# Patient Record
Sex: MALE | Race: WHITE | NOT HISPANIC OR LATINO | Employment: FULL TIME | URBAN - METROPOLITAN AREA
[De-identification: names, ages, dates, MRNs, and addresses within clinical notes are randomized per-mention and may not be internally consistent; named-entity substitution may affect disease eponyms.]

---

## 2024-01-01 ENCOUNTER — APPOINTMENT (OUTPATIENT)
Dept: RADIOLOGY | Facility: MEDICAL CENTER | Age: 54
DRG: 871 | End: 2024-01-01
Attending: INTERNAL MEDICINE
Payer: COMMERCIAL

## 2024-01-01 ENCOUNTER — APPOINTMENT (OUTPATIENT)
Dept: RADIOLOGY | Facility: MEDICAL CENTER | Age: 54
DRG: 439 | End: 2024-01-01
Attending: INTERNAL MEDICINE
Payer: COMMERCIAL

## 2024-01-01 ENCOUNTER — APPOINTMENT (OUTPATIENT)
Dept: RADIOLOGY | Facility: MEDICAL CENTER | Age: 54
DRG: 871 | End: 2024-01-01
Attending: FAMILY MEDICINE
Payer: COMMERCIAL

## 2024-01-01 ENCOUNTER — HOSPITAL ENCOUNTER (INPATIENT)
Facility: MEDICAL CENTER | Age: 54
LOS: 12 days | DRG: 871 | End: 2024-06-30
Attending: STUDENT IN AN ORGANIZED HEALTH CARE EDUCATION/TRAINING PROGRAM | Admitting: STUDENT IN AN ORGANIZED HEALTH CARE EDUCATION/TRAINING PROGRAM
Payer: COMMERCIAL

## 2024-01-01 ENCOUNTER — ANESTHESIA (OUTPATIENT)
Dept: SURGERY | Facility: MEDICAL CENTER | Age: 54
DRG: 439 | End: 2024-01-01
Payer: COMMERCIAL

## 2024-01-01 ENCOUNTER — APPOINTMENT (OUTPATIENT)
Dept: RADIOLOGY | Facility: MEDICAL CENTER | Age: 54
DRG: 439 | End: 2024-01-01
Attending: HOSPITALIST
Payer: COMMERCIAL

## 2024-01-01 ENCOUNTER — APPOINTMENT (OUTPATIENT)
Dept: RADIOLOGY | Facility: MEDICAL CENTER | Age: 54
End: 2024-01-01
Payer: COMMERCIAL

## 2024-01-01 ENCOUNTER — TELEPHONE (OUTPATIENT)
Dept: HEALTH INFORMATION MANAGEMENT | Facility: OTHER | Age: 54
End: 2024-01-01
Payer: COMMERCIAL

## 2024-01-01 ENCOUNTER — APPOINTMENT (OUTPATIENT)
Dept: CARDIOLOGY | Facility: MEDICAL CENTER | Age: 54
DRG: 871 | End: 2024-01-01
Attending: NURSE PRACTITIONER
Payer: COMMERCIAL

## 2024-01-01 ENCOUNTER — PHARMACY VISIT (OUTPATIENT)
Dept: PHARMACY | Facility: MEDICAL CENTER | Age: 54
End: 2024-01-01
Payer: COMMERCIAL

## 2024-01-01 ENCOUNTER — HOSPITAL ENCOUNTER (INPATIENT)
Facility: MEDICAL CENTER | Age: 54
LOS: 7 days | DRG: 439 | End: 2024-06-08
Attending: HOSPITALIST | Admitting: HOSPITALIST
Payer: COMMERCIAL

## 2024-01-01 ENCOUNTER — HOSPITAL ENCOUNTER (OUTPATIENT)
Dept: RADIOLOGY | Facility: MEDICAL CENTER | Age: 54
End: 2024-06-01
Payer: COMMERCIAL

## 2024-01-01 ENCOUNTER — ANESTHESIA EVENT (OUTPATIENT)
Dept: SURGERY | Facility: MEDICAL CENTER | Age: 54
DRG: 439 | End: 2024-01-01
Payer: COMMERCIAL

## 2024-01-01 VITALS
RESPIRATION RATE: 17 BRPM | DIASTOLIC BLOOD PRESSURE: 35 MMHG | SYSTOLIC BLOOD PRESSURE: 83 MMHG | TEMPERATURE: 95.5 F | OXYGEN SATURATION: 93 % | BODY MASS INDEX: 37.83 KG/M2 | WEIGHT: 304.24 LBS | HEIGHT: 75 IN | HEART RATE: 97 BPM

## 2024-01-01 VITALS
WEIGHT: 264.77 LBS | HEIGHT: 75 IN | BODY MASS INDEX: 32.92 KG/M2 | TEMPERATURE: 97.9 F | OXYGEN SATURATION: 92 % | RESPIRATION RATE: 18 BRPM | HEART RATE: 94 BPM | SYSTOLIC BLOOD PRESSURE: 128 MMHG | DIASTOLIC BLOOD PRESSURE: 71 MMHG

## 2024-01-01 DIAGNOSIS — R18.8 CIRRHOSIS OF LIVER WITH ASCITES, UNSPECIFIED HEPATIC CIRRHOSIS TYPE (HCC): ICD-10-CM

## 2024-01-01 DIAGNOSIS — K85.10 GALLSTONE PANCREATITIS: ICD-10-CM

## 2024-01-01 DIAGNOSIS — K74.60 HEPATIC CIRRHOSIS, UNSPECIFIED HEPATIC CIRRHOSIS TYPE, UNSPECIFIED WHETHER ASCITES PRESENT (HCC): ICD-10-CM

## 2024-01-01 DIAGNOSIS — K74.60 CIRRHOSIS OF LIVER WITH ASCITES, UNSPECIFIED HEPATIC CIRRHOSIS TYPE (HCC): ICD-10-CM

## 2024-01-01 LAB
AFP-TM SERPL-MCNC: 1 NG/ML (ref 0–9)
ALBUMIN FLD-MCNC: 0.2 G/DL
ALBUMIN SERPL BCP-MCNC: 2.7 G/DL (ref 3.2–4.9)
ALBUMIN SERPL BCP-MCNC: 2.9 G/DL (ref 3.2–4.9)
ALBUMIN SERPL BCP-MCNC: 3 G/DL (ref 3.2–4.9)
ALBUMIN SERPL BCP-MCNC: 3.1 G/DL (ref 3.2–4.9)
ALBUMIN SERPL BCP-MCNC: 3.2 G/DL (ref 3.2–4.9)
ALBUMIN SERPL BCP-MCNC: 3.3 G/DL (ref 3.2–4.9)
ALBUMIN SERPL BCP-MCNC: 3.4 G/DL (ref 3.2–4.9)
ALBUMIN SERPL BCP-MCNC: 3.6 G/DL (ref 3.2–4.9)
ALBUMIN/GLOB SERPL: 0.9 G/DL
ALBUMIN/GLOB SERPL: 1 G/DL
ALBUMIN/GLOB SERPL: 1 G/DL
ALBUMIN/GLOB SERPL: 1.2 G/DL
ALBUMIN/GLOB SERPL: 1.2 G/DL
ALBUMIN/GLOB SERPL: 1.4 G/DL
ALBUMIN/GLOB SERPL: 1.5 G/DL
ALBUMIN/GLOB SERPL: 1.6 G/DL
ALBUMIN/GLOB SERPL: 1.8 G/DL
ALBUMIN/GLOB SERPL: 1.8 G/DL
ALBUMIN/GLOB SERPL: 2.1 G/DL
ALBUMIN/GLOB SERPL: 2.2 G/DL
ALBUMIN/GLOB SERPL: 2.2 G/DL
ALBUMIN/GLOB SERPL: 2.4 G/DL
ALBUMIN/GLOB SERPL: 2.4 G/DL
ALBUMIN/GLOB SERPL: 2.5 G/DL
ALBUMIN/GLOB SERPL: 2.8 G/DL
ALBUMIN/GLOB SERPL: 3.3 G/DL
ALP SERPL-CCNC: 126 U/L (ref 30–99)
ALP SERPL-CCNC: 130 U/L (ref 30–99)
ALP SERPL-CCNC: 133 U/L (ref 30–99)
ALP SERPL-CCNC: 134 U/L (ref 30–99)
ALP SERPL-CCNC: 136 U/L (ref 30–99)
ALP SERPL-CCNC: 141 U/L (ref 30–99)
ALP SERPL-CCNC: 148 U/L (ref 30–99)
ALP SERPL-CCNC: 157 U/L (ref 30–99)
ALP SERPL-CCNC: 159 U/L (ref 30–99)
ALP SERPL-CCNC: 175 U/L (ref 30–99)
ALP SERPL-CCNC: 179 U/L (ref 30–99)
ALP SERPL-CCNC: 180 U/L (ref 30–99)
ALP SERPL-CCNC: 261 U/L (ref 30–99)
ALP SERPL-CCNC: 264 U/L (ref 30–99)
ALP SERPL-CCNC: 286 U/L (ref 30–99)
ALP SERPL-CCNC: 290 U/L (ref 30–99)
ALP SERPL-CCNC: 312 U/L (ref 30–99)
ALP SERPL-CCNC: 320 U/L (ref 30–99)
ALP SERPL-CCNC: 320 U/L (ref 30–99)
ALP SERPL-CCNC: 331 U/L (ref 30–99)
ALT SERPL-CCNC: 15 U/L (ref 2–50)
ALT SERPL-CCNC: 18 U/L (ref 2–50)
ALT SERPL-CCNC: 19 U/L (ref 2–50)
ALT SERPL-CCNC: 20 U/L (ref 2–50)
ALT SERPL-CCNC: 20 U/L (ref 2–50)
ALT SERPL-CCNC: 21 U/L (ref 2–50)
ALT SERPL-CCNC: 22 U/L (ref 2–50)
ALT SERPL-CCNC: 22 U/L (ref 2–50)
ALT SERPL-CCNC: 23 U/L (ref 2–50)
ALT SERPL-CCNC: 24 U/L (ref 2–50)
ALT SERPL-CCNC: 26 U/L (ref 2–50)
ALT SERPL-CCNC: 29 U/L (ref 2–50)
ALT SERPL-CCNC: 31 U/L (ref 2–50)
ALT SERPL-CCNC: 72 U/L (ref 2–50)
AMMONIA PLAS-SCNC: 28 UMOL/L (ref 11–45)
AMMONIA PLAS-SCNC: 35 UMOL/L (ref 11–45)
AMMONIA PLAS-SCNC: 41 UMOL/L (ref 11–45)
AMMONIA PLAS-SCNC: 43 UMOL/L (ref 11–45)
AMMONIA PLAS-SCNC: 44 UMOL/L (ref 11–45)
AMYLASE FLD-CCNC: >7500 U/L
ANION GAP SERPL CALC-SCNC: 11 MMOL/L (ref 7–16)
ANION GAP SERPL CALC-SCNC: 11 MMOL/L (ref 7–16)
ANION GAP SERPL CALC-SCNC: 12 MMOL/L (ref 7–16)
ANION GAP SERPL CALC-SCNC: 14 MMOL/L (ref 7–16)
ANION GAP SERPL CALC-SCNC: 15 MMOL/L (ref 7–16)
ANION GAP SERPL CALC-SCNC: 17 MMOL/L (ref 7–16)
ANION GAP SERPL CALC-SCNC: 18 MMOL/L (ref 7–16)
ANION GAP SERPL CALC-SCNC: 18 MMOL/L (ref 7–16)
ANION GAP SERPL CALC-SCNC: 19 MMOL/L (ref 7–16)
ANION GAP SERPL CALC-SCNC: 19 MMOL/L (ref 7–16)
ANION GAP SERPL CALC-SCNC: 20 MMOL/L (ref 7–16)
ANION GAP SERPL CALC-SCNC: 21 MMOL/L (ref 7–16)
ANION GAP SERPL CALC-SCNC: 22 MMOL/L (ref 7–16)
ANION GAP SERPL CALC-SCNC: 24 MMOL/L (ref 7–16)
ANION GAP SERPL CALC-SCNC: 30 MMOL/L (ref 7–16)
APPEARANCE FLD: CLEAR
APPEARANCE FLD: NORMAL
APPEARANCE UR: ABNORMAL
APPEARANCE UR: CLEAR
AST SERPL-CCNC: 102 U/L (ref 12–45)
AST SERPL-CCNC: 334 U/L (ref 12–45)
AST SERPL-CCNC: 38 U/L (ref 12–45)
AST SERPL-CCNC: 41 U/L (ref 12–45)
AST SERPL-CCNC: 42 U/L (ref 12–45)
AST SERPL-CCNC: 45 U/L (ref 12–45)
AST SERPL-CCNC: 45 U/L (ref 12–45)
AST SERPL-CCNC: 48 U/L (ref 12–45)
AST SERPL-CCNC: 49 U/L (ref 12–45)
AST SERPL-CCNC: 49 U/L (ref 12–45)
AST SERPL-CCNC: 51 U/L (ref 12–45)
AST SERPL-CCNC: 52 U/L (ref 12–45)
AST SERPL-CCNC: 52 U/L (ref 12–45)
AST SERPL-CCNC: 55 U/L (ref 12–45)
AST SERPL-CCNC: 64 U/L (ref 12–45)
AST SERPL-CCNC: 65 U/L (ref 12–45)
AST SERPL-CCNC: 74 U/L (ref 12–45)
AST SERPL-CCNC: 77 U/L (ref 12–45)
AST SERPL-CCNC: 81 U/L (ref 12–45)
AST SERPL-CCNC: 89 U/L (ref 12–45)
BACTERIA #/AREA URNS HPF: ABNORMAL /HPF
BACTERIA #/AREA URNS HPF: ABNORMAL /HPF
BACTERIA BLD CULT: NORMAL
BACTERIA FLD AEROBE CULT: NORMAL
BACTERIA FLD AEROBE CULT: NORMAL
BASOPHILS # BLD AUTO: 0.1 % (ref 0–1.8)
BASOPHILS # BLD AUTO: 0.2 % (ref 0–1.8)
BASOPHILS # BLD AUTO: 0.3 % (ref 0–1.8)
BASOPHILS # BLD AUTO: 0.3 % (ref 0–1.8)
BASOPHILS # BLD: 0.01 K/UL (ref 0–0.12)
BASOPHILS # BLD: 0.02 K/UL (ref 0–0.12)
BASOPHILS # BLD: 0.03 K/UL (ref 0–0.12)
BASOPHILS # BLD: 0.04 K/UL (ref 0–0.12)
BASOPHILS # BLD: 0.05 K/UL (ref 0–0.12)
BASOPHILS # BLD: 0.05 K/UL (ref 0–0.12)
BASOPHILS # BLD: 0.12 K/UL (ref 0–0.12)
BILIRUB FLD-MCNC: 0.7 MG/DL
BILIRUB SERPL-MCNC: 11.4 MG/DL (ref 0.1–1.5)
BILIRUB SERPL-MCNC: 14.2 MG/DL (ref 0.1–1.5)
BILIRUB SERPL-MCNC: 17.9 MG/DL (ref 0.1–1.5)
BILIRUB SERPL-MCNC: 19.9 MG/DL (ref 0.1–1.5)
BILIRUB SERPL-MCNC: 2.5 MG/DL (ref 0.1–1.5)
BILIRUB SERPL-MCNC: 2.7 MG/DL (ref 0.1–1.5)
BILIRUB SERPL-MCNC: 2.8 MG/DL (ref 0.1–1.5)
BILIRUB SERPL-MCNC: 2.9 MG/DL (ref 0.1–1.5)
BILIRUB SERPL-MCNC: 22 MG/DL (ref 0.1–1.5)
BILIRUB SERPL-MCNC: 3.3 MG/DL (ref 0.1–1.5)
BILIRUB SERPL-MCNC: 3.3 MG/DL (ref 0.1–1.5)
BILIRUB SERPL-MCNC: 3.6 MG/DL (ref 0.1–1.5)
BILIRUB SERPL-MCNC: 4 MG/DL (ref 0.1–1.5)
BILIRUB SERPL-MCNC: 4.1 MG/DL (ref 0.1–1.5)
BILIRUB SERPL-MCNC: 4.2 MG/DL (ref 0.1–1.5)
BILIRUB SERPL-MCNC: 4.5 MG/DL (ref 0.1–1.5)
BILIRUB SERPL-MCNC: 4.7 MG/DL (ref 0.1–1.5)
BILIRUB SERPL-MCNC: 5 MG/DL (ref 0.1–1.5)
BILIRUB SERPL-MCNC: 6.4 MG/DL (ref 0.1–1.5)
BILIRUB SERPL-MCNC: 9.1 MG/DL (ref 0.1–1.5)
BILIRUB UR QL STRIP.AUTO: ABNORMAL
BILIRUB UR QL STRIP.AUTO: ABNORMAL
BODY FLD TYPE: NORMAL
BUN SERPL-MCNC: 100 MG/DL (ref 8–22)
BUN SERPL-MCNC: 101 MG/DL (ref 8–22)
BUN SERPL-MCNC: 16 MG/DL (ref 8–22)
BUN SERPL-MCNC: 19 MG/DL (ref 8–22)
BUN SERPL-MCNC: 20 MG/DL (ref 8–22)
BUN SERPL-MCNC: 21 MG/DL (ref 8–22)
BUN SERPL-MCNC: 22 MG/DL (ref 8–22)
BUN SERPL-MCNC: 29 MG/DL (ref 8–22)
BUN SERPL-MCNC: 30 MG/DL (ref 8–22)
BUN SERPL-MCNC: 44 MG/DL (ref 8–22)
BUN SERPL-MCNC: 48 MG/DL (ref 8–22)
BUN SERPL-MCNC: 56 MG/DL (ref 8–22)
BUN SERPL-MCNC: 58 MG/DL (ref 8–22)
BUN SERPL-MCNC: 61 MG/DL (ref 8–22)
BUN SERPL-MCNC: 68 MG/DL (ref 8–22)
BUN SERPL-MCNC: 71 MG/DL (ref 8–22)
BUN SERPL-MCNC: 73 MG/DL (ref 8–22)
BUN SERPL-MCNC: 85 MG/DL (ref 8–22)
BUN SERPL-MCNC: 86 MG/DL (ref 8–22)
BUN SERPL-MCNC: 86 MG/DL (ref 8–22)
BUN SERPL-MCNC: 87 MG/DL (ref 8–22)
BUN SERPL-MCNC: 88 MG/DL (ref 8–22)
BUN SERPL-MCNC: 89 MG/DL (ref 8–22)
BUN SERPL-MCNC: 89 MG/DL (ref 8–22)
BUN SERPL-MCNC: 90 MG/DL (ref 8–22)
BUN SERPL-MCNC: 90 MG/DL (ref 8–22)
BUN SERPL-MCNC: 91 MG/DL (ref 8–22)
BUN SERPL-MCNC: 92 MG/DL (ref 8–22)
BUN SERPL-MCNC: 95 MG/DL (ref 8–22)
BUN SERPL-MCNC: 97 MG/DL (ref 8–22)
BUN SERPL-MCNC: 97 MG/DL (ref 8–22)
BUN SERPL-MCNC: 98 MG/DL (ref 8–22)
CA-I SERPL-SCNC: 1 MMOL/L (ref 1.1–1.3)
CALCIUM ALBUM COR SERPL-MCNC: 10 MG/DL (ref 8.5–10.5)
CALCIUM ALBUM COR SERPL-MCNC: 10.1 MG/DL (ref 8.5–10.5)
CALCIUM ALBUM COR SERPL-MCNC: 10.3 MG/DL (ref 8.5–10.5)
CALCIUM ALBUM COR SERPL-MCNC: 8.1 MG/DL (ref 8.5–10.5)
CALCIUM ALBUM COR SERPL-MCNC: 8.3 MG/DL (ref 8.5–10.5)
CALCIUM ALBUM COR SERPL-MCNC: 8.5 MG/DL (ref 8.5–10.5)
CALCIUM ALBUM COR SERPL-MCNC: 8.5 MG/DL (ref 8.5–10.5)
CALCIUM ALBUM COR SERPL-MCNC: 8.7 MG/DL (ref 8.5–10.5)
CALCIUM ALBUM COR SERPL-MCNC: 8.8 MG/DL (ref 8.5–10.5)
CALCIUM ALBUM COR SERPL-MCNC: 8.9 MG/DL (ref 8.5–10.5)
CALCIUM ALBUM COR SERPL-MCNC: 9 MG/DL (ref 8.5–10.5)
CALCIUM ALBUM COR SERPL-MCNC: 9 MG/DL (ref 8.5–10.5)
CALCIUM ALBUM COR SERPL-MCNC: 9.1 MG/DL (ref 8.5–10.5)
CALCIUM ALBUM COR SERPL-MCNC: 9.2 MG/DL (ref 8.5–10.5)
CALCIUM ALBUM COR SERPL-MCNC: 9.2 MG/DL (ref 8.5–10.5)
CALCIUM ALBUM COR SERPL-MCNC: 9.3 MG/DL (ref 8.5–10.5)
CALCIUM ALBUM COR SERPL-MCNC: 9.4 MG/DL (ref 8.5–10.5)
CALCIUM ALBUM COR SERPL-MCNC: 9.5 MG/DL (ref 8.5–10.5)
CALCIUM ALBUM COR SERPL-MCNC: 9.7 MG/DL (ref 8.5–10.5)
CALCIUM ALBUM COR SERPL-MCNC: 9.9 MG/DL (ref 8.5–10.5)
CALCIUM SERPL-MCNC: 7.4 MG/DL (ref 8.5–10.5)
CALCIUM SERPL-MCNC: 7.4 MG/DL (ref 8.5–10.5)
CALCIUM SERPL-MCNC: 7.5 MG/DL (ref 8.5–10.5)
CALCIUM SERPL-MCNC: 7.6 MG/DL (ref 8.5–10.5)
CALCIUM SERPL-MCNC: 7.6 MG/DL (ref 8.5–10.5)
CALCIUM SERPL-MCNC: 7.7 MG/DL (ref 8.5–10.5)
CALCIUM SERPL-MCNC: 7.7 MG/DL (ref 8.5–10.5)
CALCIUM SERPL-MCNC: 7.8 MG/DL (ref 8.5–10.5)
CALCIUM SERPL-MCNC: 7.9 MG/DL (ref 8.5–10.5)
CALCIUM SERPL-MCNC: 7.9 MG/DL (ref 8.5–10.5)
CALCIUM SERPL-MCNC: 8 MG/DL (ref 8.5–10.5)
CALCIUM SERPL-MCNC: 8 MG/DL (ref 8.5–10.5)
CALCIUM SERPL-MCNC: 8.1 MG/DL (ref 8.4–10.2)
CALCIUM SERPL-MCNC: 8.1 MG/DL (ref 8.5–10.5)
CALCIUM SERPL-MCNC: 8.2 MG/DL (ref 8.4–10.2)
CALCIUM SERPL-MCNC: 8.2 MG/DL (ref 8.4–10.2)
CALCIUM SERPL-MCNC: 8.3 MG/DL (ref 8.4–10.2)
CALCIUM SERPL-MCNC: 8.3 MG/DL (ref 8.4–10.2)
CALCIUM SERPL-MCNC: 8.4 MG/DL (ref 8.4–10.2)
CALCIUM SERPL-MCNC: 8.4 MG/DL (ref 8.4–10.2)
CALCIUM SERPL-MCNC: 8.4 MG/DL (ref 8.5–10.5)
CALCIUM SERPL-MCNC: 8.5 MG/DL (ref 8.5–10.5)
CALCIUM SERPL-MCNC: 8.7 MG/DL (ref 8.5–10.5)
CALCIUM SERPL-MCNC: 8.8 MG/DL (ref 8.5–10.5)
CALCIUM SERPL-MCNC: 9 MG/DL (ref 8.5–10.5)
CALCIUM SERPL-MCNC: 9.1 MG/DL (ref 8.5–10.5)
CALCIUM SERPL-MCNC: 9.3 MG/DL (ref 8.5–10.5)
CALCIUM SERPL-MCNC: 9.4 MG/DL (ref 8.5–10.5)
CALCIUM SERPL-MCNC: 9.4 MG/DL (ref 8.5–10.5)
CANCER AG19-9 SERPL-ACNC: 214 U/ML (ref 0–35)
CEA SERPL-MCNC: 4.2 NG/ML (ref 0–3)
CELLS FLD: 8
CHLORIDE SERPL-SCNC: 101 MMOL/L (ref 96–112)
CHLORIDE SERPL-SCNC: 83 MMOL/L (ref 96–112)
CHLORIDE SERPL-SCNC: 84 MMOL/L (ref 96–112)
CHLORIDE SERPL-SCNC: 85 MMOL/L (ref 96–112)
CHLORIDE SERPL-SCNC: 86 MMOL/L (ref 96–112)
CHLORIDE SERPL-SCNC: 87 MMOL/L (ref 96–112)
CHLORIDE SERPL-SCNC: 87 MMOL/L (ref 96–112)
CHLORIDE SERPL-SCNC: 90 MMOL/L (ref 96–112)
CHLORIDE SERPL-SCNC: 90 MMOL/L (ref 96–112)
CHLORIDE SERPL-SCNC: 91 MMOL/L (ref 96–112)
CHLORIDE SERPL-SCNC: 92 MMOL/L (ref 96–112)
CHLORIDE SERPL-SCNC: 93 MMOL/L (ref 96–112)
CHLORIDE SERPL-SCNC: 93 MMOL/L (ref 96–112)
CHLORIDE SERPL-SCNC: 96 MMOL/L (ref 96–112)
CHLORIDE SERPL-SCNC: 97 MMOL/L (ref 96–112)
CHLORIDE SERPL-SCNC: 98 MMOL/L (ref 96–112)
CHLORIDE SERPL-SCNC: 99 MMOL/L (ref 96–112)
CO2 SERPL-SCNC: 12 MMOL/L (ref 20–33)
CO2 SERPL-SCNC: 13 MMOL/L (ref 20–33)
CO2 SERPL-SCNC: 13 MMOL/L (ref 20–33)
CO2 SERPL-SCNC: 14 MMOL/L (ref 20–33)
CO2 SERPL-SCNC: 15 MMOL/L (ref 20–33)
CO2 SERPL-SCNC: 16 MMOL/L (ref 20–33)
CO2 SERPL-SCNC: 17 MMOL/L (ref 20–33)
CO2 SERPL-SCNC: 18 MMOL/L (ref 20–33)
CO2 SERPL-SCNC: 19 MMOL/L (ref 20–33)
CO2 SERPL-SCNC: 20 MMOL/L (ref 20–33)
CO2 SERPL-SCNC: 21 MMOL/L (ref 20–33)
CO2 SERPL-SCNC: 21 MMOL/L (ref 20–33)
CO2 SERPL-SCNC: 22 MMOL/L (ref 20–33)
CO2 SERPL-SCNC: 22 MMOL/L (ref 20–33)
CO2 SERPL-SCNC: 9 MMOL/L (ref 20–33)
COLOR FLD: YELLOW
COLOR FLD: YELLOW
COLOR UR: YELLOW
COLOR UR: YELLOW
CORTIS SERPL-MCNC: 23.2 UG/DL (ref 0–23)
CREAT SERPL-MCNC: 0.76 MG/DL (ref 0.5–1.4)
CREAT SERPL-MCNC: 0.8 MG/DL (ref 0.5–1.4)
CREAT SERPL-MCNC: 0.88 MG/DL (ref 0.5–1.4)
CREAT SERPL-MCNC: 0.92 MG/DL (ref 0.5–1.4)
CREAT SERPL-MCNC: 1.18 MG/DL (ref 0.5–1.4)
CREAT SERPL-MCNC: 1.96 MG/DL (ref 0.5–1.4)
CREAT SERPL-MCNC: 2.1 MG/DL (ref 0.5–1.4)
CREAT SERPL-MCNC: 3.55 MG/DL (ref 0.5–1.4)
CREAT SERPL-MCNC: 3.67 MG/DL (ref 0.5–1.4)
CREAT SERPL-MCNC: 3.97 MG/DL (ref 0.5–1.4)
CREAT SERPL-MCNC: 4.93 MG/DL (ref 0.5–1.4)
CREAT SERPL-MCNC: 4.95 MG/DL (ref 0.5–1.4)
CREAT SERPL-MCNC: 5.72 MG/DL (ref 0.5–1.4)
CREAT SERPL-MCNC: 5.84 MG/DL (ref 0.5–1.4)
CREAT SERPL-MCNC: 5.96 MG/DL (ref 0.5–1.4)
CREAT SERPL-MCNC: 6.97 MG/DL (ref 0.5–1.4)
CREAT SERPL-MCNC: 6.99 MG/DL (ref 0.5–1.4)
CREAT SERPL-MCNC: 7 MG/DL (ref 0.5–1.4)
CREAT SERPL-MCNC: 7.22 MG/DL (ref 0.5–1.4)
CREAT SERPL-MCNC: 7.26 MG/DL (ref 0.5–1.4)
CREAT SERPL-MCNC: 7.29 MG/DL (ref 0.5–1.4)
CREAT SERPL-MCNC: 7.33 MG/DL (ref 0.5–1.4)
CREAT SERPL-MCNC: 7.33 MG/DL (ref 0.5–1.4)
CREAT SERPL-MCNC: 7.43 MG/DL (ref 0.5–1.4)
CREAT SERPL-MCNC: 7.62 MG/DL (ref 0.5–1.4)
CREAT SERPL-MCNC: 7.64 MG/DL (ref 0.5–1.4)
CREAT SERPL-MCNC: 7.92 MG/DL (ref 0.5–1.4)
CREAT SERPL-MCNC: 8.37 MG/DL (ref 0.5–1.4)
CREAT SERPL-MCNC: 8.49 MG/DL (ref 0.5–1.4)
CREAT SERPL-MCNC: 8.88 MG/DL (ref 0.5–1.4)
CREAT SERPL-MCNC: 8.92 MG/DL (ref 0.5–1.4)
CREAT SERPL-MCNC: 9.1 MG/DL (ref 0.5–1.4)
CREAT SERPL-MCNC: 9.21 MG/DL (ref 0.5–1.4)
CREAT SERPL-MCNC: 9.23 MG/DL (ref 0.5–1.4)
CREAT SERPL-MCNC: 9.31 MG/DL (ref 0.5–1.4)
CREAT SERPL-MCNC: 9.71 MG/DL (ref 0.5–1.4)
CREAT UR-MCNC: 167.4 MG/DL
EKG IMPRESSION: NORMAL
EOSINOPHIL # BLD AUTO: 0.05 K/UL (ref 0–0.51)
EOSINOPHIL # BLD AUTO: 0.05 K/UL (ref 0–0.51)
EOSINOPHIL # BLD AUTO: 0.06 K/UL (ref 0–0.51)
EOSINOPHIL # BLD AUTO: 0.08 K/UL (ref 0–0.51)
EOSINOPHIL # BLD AUTO: 0.11 K/UL (ref 0–0.51)
EOSINOPHIL # BLD AUTO: 0.12 K/UL (ref 0–0.51)
EOSINOPHIL # BLD AUTO: 0.13 K/UL (ref 0–0.51)
EOSINOPHIL # BLD AUTO: 0.14 K/UL (ref 0–0.51)
EOSINOPHIL # BLD AUTO: 0.14 K/UL (ref 0–0.51)
EOSINOPHIL # BLD AUTO: 0.16 K/UL (ref 0–0.51)
EOSINOPHIL # BLD AUTO: 0.18 K/UL (ref 0–0.51)
EOSINOPHIL # BLD AUTO: 0.21 K/UL (ref 0–0.51)
EOSINOPHIL NFR BLD: 0.1 % (ref 0–6.9)
EOSINOPHIL NFR BLD: 0.3 % (ref 0–6.9)
EOSINOPHIL NFR BLD: 0.4 % (ref 0–6.9)
EOSINOPHIL NFR BLD: 0.7 % (ref 0–6.9)
EOSINOPHIL NFR BLD: 1 % (ref 0–6.9)
EOSINOPHIL NFR BLD: 1.1 % (ref 0–6.9)
EOSINOPHIL NFR BLD: 1.2 % (ref 0–6.9)
EOSINOPHIL NFR BLD: 1.2 % (ref 0–6.9)
EPI CELLS #/AREA URNS HPF: ABNORMAL /HPF
EPI CELLS #/AREA URNS HPF: ABNORMAL /HPF
ERYTHROCYTE [DISTWIDTH] IN BLOOD BY AUTOMATED COUNT: 50.1 FL (ref 35.9–50)
ERYTHROCYTE [DISTWIDTH] IN BLOOD BY AUTOMATED COUNT: 51.9 FL (ref 35.9–50)
ERYTHROCYTE [DISTWIDTH] IN BLOOD BY AUTOMATED COUNT: 52.1 FL (ref 35.9–50)
ERYTHROCYTE [DISTWIDTH] IN BLOOD BY AUTOMATED COUNT: 53.3 FL (ref 35.9–50)
ERYTHROCYTE [DISTWIDTH] IN BLOOD BY AUTOMATED COUNT: 53.4 FL (ref 35.9–50)
ERYTHROCYTE [DISTWIDTH] IN BLOOD BY AUTOMATED COUNT: 53.9 FL (ref 35.9–50)
ERYTHROCYTE [DISTWIDTH] IN BLOOD BY AUTOMATED COUNT: 54.2 FL (ref 35.9–50)
ERYTHROCYTE [DISTWIDTH] IN BLOOD BY AUTOMATED COUNT: 54.6 FL (ref 35.9–50)
ERYTHROCYTE [DISTWIDTH] IN BLOOD BY AUTOMATED COUNT: 54.9 FL (ref 35.9–50)
ERYTHROCYTE [DISTWIDTH] IN BLOOD BY AUTOMATED COUNT: 55 FL (ref 35.9–50)
ERYTHROCYTE [DISTWIDTH] IN BLOOD BY AUTOMATED COUNT: 55.3 FL (ref 35.9–50)
ERYTHROCYTE [DISTWIDTH] IN BLOOD BY AUTOMATED COUNT: 55.3 FL (ref 35.9–50)
ERYTHROCYTE [DISTWIDTH] IN BLOOD BY AUTOMATED COUNT: 55.5 FL (ref 35.9–50)
ERYTHROCYTE [DISTWIDTH] IN BLOOD BY AUTOMATED COUNT: 55.8 FL (ref 35.9–50)
ERYTHROCYTE [DISTWIDTH] IN BLOOD BY AUTOMATED COUNT: 56.7 FL (ref 35.9–50)
ERYTHROCYTE [DISTWIDTH] IN BLOOD BY AUTOMATED COUNT: 56.9 FL (ref 35.9–50)
ERYTHROCYTE [DISTWIDTH] IN BLOOD BY AUTOMATED COUNT: 58 FL (ref 35.9–50)
ERYTHROCYTE [DISTWIDTH] IN BLOOD BY AUTOMATED COUNT: 58.1 FL (ref 35.9–50)
ERYTHROCYTE [DISTWIDTH] IN BLOOD BY AUTOMATED COUNT: 59.1 FL (ref 35.9–50)
ERYTHROCYTE [DISTWIDTH] IN BLOOD BY AUTOMATED COUNT: 60.9 FL (ref 35.9–50)
ERYTHROCYTE [DISTWIDTH] IN BLOOD BY AUTOMATED COUNT: 64.1 FL (ref 35.9–50)
FERRITIN SERPL-MCNC: 168 NG/ML (ref 22–322)
FUNGUS SPEC CULT: NORMAL
FUNGUS SPEC FUNGUS STN: NORMAL
FUNGUS SPEC FUNGUS STN: NORMAL
GFR SERPLBLD CREATININE-BSD FMLA CKD-EPI: 100 ML/MIN/1.73 M 2
GFR SERPLBLD CREATININE-BSD FMLA CKD-EPI: 103 ML/MIN/1.73 M 2
GFR SERPLBLD CREATININE-BSD FMLA CKD-EPI: 106 ML/MIN/1.73 M 2
GFR SERPLBLD CREATININE-BSD FMLA CKD-EPI: 108 ML/MIN/1.73 M 2
GFR SERPLBLD CREATININE-BSD FMLA CKD-EPI: 11 ML/MIN/1.73 M 2
GFR SERPLBLD CREATININE-BSD FMLA CKD-EPI: 13 ML/MIN/1.73 M 2
GFR SERPLBLD CREATININE-BSD FMLA CKD-EPI: 13 ML/MIN/1.73 M 2
GFR SERPLBLD CREATININE-BSD FMLA CKD-EPI: 17 ML/MIN/1.73 M 2
GFR SERPLBLD CREATININE-BSD FMLA CKD-EPI: 19 ML/MIN/1.73 M 2
GFR SERPLBLD CREATININE-BSD FMLA CKD-EPI: 20 ML/MIN/1.73 M 2
GFR SERPLBLD CREATININE-BSD FMLA CKD-EPI: 37 ML/MIN/1.73 M 2
GFR SERPLBLD CREATININE-BSD FMLA CKD-EPI: 40 ML/MIN/1.73 M 2
GFR SERPLBLD CREATININE-BSD FMLA CKD-EPI: 6 ML/MIN/1.73 M 2
GFR SERPLBLD CREATININE-BSD FMLA CKD-EPI: 7 ML/MIN/1.73 M 2
GFR SERPLBLD CREATININE-BSD FMLA CKD-EPI: 74 ML/MIN/1.73 M 2
GFR SERPLBLD CREATININE-BSD FMLA CKD-EPI: 8 ML/MIN/1.73 M 2
GFR SERPLBLD CREATININE-BSD FMLA CKD-EPI: 9 ML/MIN/1.73 M 2
GLOBULIN SER CALC-MCNC: 1.1 G/DL (ref 1.9–3.5)
GLOBULIN SER CALC-MCNC: 1.2 G/DL (ref 1.9–3.5)
GLOBULIN SER CALC-MCNC: 1.3 G/DL (ref 1.9–3.5)
GLOBULIN SER CALC-MCNC: 1.4 G/DL (ref 1.9–3.5)
GLOBULIN SER CALC-MCNC: 1.5 G/DL (ref 1.9–3.5)
GLOBULIN SER CALC-MCNC: 1.6 G/DL (ref 1.9–3.5)
GLOBULIN SER CALC-MCNC: 2 G/DL (ref 1.9–3.5)
GLOBULIN SER CALC-MCNC: 2.1 G/DL (ref 1.9–3.5)
GLOBULIN SER CALC-MCNC: 2.1 G/DL (ref 1.9–3.5)
GLOBULIN SER CALC-MCNC: 2.2 G/DL (ref 1.9–3.5)
GLOBULIN SER CALC-MCNC: 2.4 G/DL (ref 1.9–3.5)
GLOBULIN SER CALC-MCNC: 2.5 G/DL (ref 1.9–3.5)
GLOBULIN SER CALC-MCNC: 2.5 G/DL (ref 1.9–3.5)
GLOBULIN SER CALC-MCNC: 2.9 G/DL (ref 1.9–3.5)
GLOBULIN SER CALC-MCNC: 3 G/DL (ref 1.9–3.5)
GLOBULIN SER CALC-MCNC: 3.1 G/DL (ref 1.9–3.5)
GLUCOSE BLD STRIP.AUTO-MCNC: 191 MG/DL (ref 65–99)
GLUCOSE BLD STRIP.AUTO-MCNC: 24 MG/DL (ref 65–99)
GLUCOSE SERPL-MCNC: 100 MG/DL (ref 65–99)
GLUCOSE SERPL-MCNC: 100 MG/DL (ref 65–99)
GLUCOSE SERPL-MCNC: 101 MG/DL (ref 65–99)
GLUCOSE SERPL-MCNC: 102 MG/DL (ref 65–99)
GLUCOSE SERPL-MCNC: 103 MG/DL (ref 65–99)
GLUCOSE SERPL-MCNC: 103 MG/DL (ref 65–99)
GLUCOSE SERPL-MCNC: 104 MG/DL (ref 65–99)
GLUCOSE SERPL-MCNC: 105 MG/DL (ref 65–99)
GLUCOSE SERPL-MCNC: 106 MG/DL (ref 65–99)
GLUCOSE SERPL-MCNC: 108 MG/DL (ref 65–99)
GLUCOSE SERPL-MCNC: 108 MG/DL (ref 65–99)
GLUCOSE SERPL-MCNC: 109 MG/DL (ref 65–99)
GLUCOSE SERPL-MCNC: 112 MG/DL (ref 65–99)
GLUCOSE SERPL-MCNC: 112 MG/DL (ref 65–99)
GLUCOSE SERPL-MCNC: 113 MG/DL (ref 65–99)
GLUCOSE SERPL-MCNC: 115 MG/DL (ref 65–99)
GLUCOSE SERPL-MCNC: 117 MG/DL (ref 65–99)
GLUCOSE SERPL-MCNC: 122 MG/DL (ref 65–99)
GLUCOSE SERPL-MCNC: 128 MG/DL (ref 65–99)
GLUCOSE SERPL-MCNC: 39 MG/DL (ref 65–99)
GLUCOSE SERPL-MCNC: 85 MG/DL (ref 65–99)
GLUCOSE SERPL-MCNC: 90 MG/DL (ref 65–99)
GLUCOSE SERPL-MCNC: 93 MG/DL (ref 65–99)
GLUCOSE SERPL-MCNC: 94 MG/DL (ref 65–99)
GLUCOSE SERPL-MCNC: 95 MG/DL (ref 65–99)
GLUCOSE SERPL-MCNC: 95 MG/DL (ref 65–99)
GLUCOSE SERPL-MCNC: 96 MG/DL (ref 65–99)
GLUCOSE SERPL-MCNC: 96 MG/DL (ref 65–99)
GLUCOSE SERPL-MCNC: 97 MG/DL (ref 65–99)
GLUCOSE SERPL-MCNC: 97 MG/DL (ref 65–99)
GLUCOSE SERPL-MCNC: 98 MG/DL (ref 65–99)
GLUCOSE SERPL-MCNC: 98 MG/DL (ref 65–99)
GLUCOSE SERPL-MCNC: 99 MG/DL (ref 65–99)
GLUCOSE SERPL-MCNC: 99 MG/DL (ref 65–99)
GLUCOSE UR STRIP.AUTO-MCNC: NEGATIVE MG/DL
GLUCOSE UR STRIP.AUTO-MCNC: NEGATIVE MG/DL
GRAM STN SPEC: NORMAL
HBV CORE AB SERPL QL IA: NONREACTIVE
HBV SURFACE AB SERPL IA-ACNC: 656 MIU/ML (ref 0–10)
HBV SURFACE AG SER QL: NORMAL
HCT VFR BLD AUTO: 24.6 % (ref 42–52)
HCT VFR BLD AUTO: 26.3 % (ref 42–52)
HCT VFR BLD AUTO: 26.4 % (ref 42–52)
HCT VFR BLD AUTO: 26.9 % (ref 42–52)
HCT VFR BLD AUTO: 26.9 % (ref 42–52)
HCT VFR BLD AUTO: 27.1 % (ref 42–52)
HCT VFR BLD AUTO: 27.8 % (ref 42–52)
HCT VFR BLD AUTO: 27.8 % (ref 42–52)
HCT VFR BLD AUTO: 28.9 % (ref 42–52)
HCT VFR BLD AUTO: 30 % (ref 42–52)
HCT VFR BLD AUTO: 30.1 % (ref 42–52)
HCT VFR BLD AUTO: 30.5 % (ref 42–52)
HCT VFR BLD AUTO: 30.6 % (ref 42–52)
HCT VFR BLD AUTO: 31.2 % (ref 42–52)
HCT VFR BLD AUTO: 31.3 % (ref 42–52)
HCT VFR BLD AUTO: 32.3 % (ref 42–52)
HCT VFR BLD AUTO: 33.1 % (ref 42–52)
HCT VFR BLD AUTO: 34.8 % (ref 42–52)
HCT VFR BLD AUTO: 36.6 % (ref 42–52)
HCT VFR BLD AUTO: 37 % (ref 42–52)
HCT VFR BLD AUTO: 38 % (ref 42–52)
HGB BLD-MCNC: 10 G/DL (ref 14–18)
HGB BLD-MCNC: 10.1 G/DL (ref 14–18)
HGB BLD-MCNC: 10.3 G/DL (ref 14–18)
HGB BLD-MCNC: 10.3 G/DL (ref 14–18)
HGB BLD-MCNC: 10.5 G/DL (ref 14–18)
HGB BLD-MCNC: 10.5 G/DL (ref 14–18)
HGB BLD-MCNC: 10.7 G/DL (ref 14–18)
HGB BLD-MCNC: 10.8 G/DL (ref 14–18)
HGB BLD-MCNC: 11.1 G/DL (ref 14–18)
HGB BLD-MCNC: 11.1 G/DL (ref 14–18)
HGB BLD-MCNC: 11.7 G/DL (ref 14–18)
HGB BLD-MCNC: 12.2 G/DL (ref 14–18)
HGB BLD-MCNC: 12.3 G/DL (ref 14–18)
HGB BLD-MCNC: 12.4 G/DL (ref 14–18)
HGB BLD-MCNC: 8.7 G/DL (ref 14–18)
HGB BLD-MCNC: 9.3 G/DL (ref 14–18)
HGB BLD-MCNC: 9.4 G/DL (ref 14–18)
HGB BLD-MCNC: 9.4 G/DL (ref 14–18)
HGB BLD-MCNC: 9.5 G/DL (ref 14–18)
HGB BLD-MCNC: 9.7 G/DL (ref 14–18)
HGB BLD-MCNC: 9.7 G/DL (ref 14–18)
HYALINE CASTS #/AREA URNS LPF: ABNORMAL /LPF
IMM GRANULOCYTES # BLD AUTO: 0.15 K/UL (ref 0–0.11)
IMM GRANULOCYTES # BLD AUTO: 0.16 K/UL (ref 0–0.11)
IMM GRANULOCYTES # BLD AUTO: 0.21 K/UL (ref 0–0.11)
IMM GRANULOCYTES # BLD AUTO: 0.21 K/UL (ref 0–0.11)
IMM GRANULOCYTES # BLD AUTO: 0.25 K/UL (ref 0–0.11)
IMM GRANULOCYTES # BLD AUTO: 0.3 K/UL (ref 0–0.11)
IMM GRANULOCYTES # BLD AUTO: 0.31 K/UL (ref 0–0.11)
IMM GRANULOCYTES # BLD AUTO: 0.32 K/UL (ref 0–0.11)
IMM GRANULOCYTES # BLD AUTO: 0.36 K/UL (ref 0–0.11)
IMM GRANULOCYTES # BLD AUTO: 0.39 K/UL (ref 0–0.11)
IMM GRANULOCYTES # BLD AUTO: 0.54 K/UL (ref 0–0.11)
IMM GRANULOCYTES # BLD AUTO: 0.95 K/UL (ref 0–0.11)
IMM GRANULOCYTES NFR BLD AUTO: 1.2 % (ref 0–0.9)
IMM GRANULOCYTES NFR BLD AUTO: 1.3 % (ref 0–0.9)
IMM GRANULOCYTES NFR BLD AUTO: 1.4 % (ref 0–0.9)
IMM GRANULOCYTES NFR BLD AUTO: 1.4 % (ref 0–0.9)
IMM GRANULOCYTES NFR BLD AUTO: 1.6 % (ref 0–0.9)
IMM GRANULOCYTES NFR BLD AUTO: 1.8 % (ref 0–0.9)
IMM GRANULOCYTES NFR BLD AUTO: 2.1 % (ref 0–0.9)
IMM GRANULOCYTES NFR BLD AUTO: 2.2 % (ref 0–0.9)
IMM GRANULOCYTES NFR BLD AUTO: 2.2 % (ref 0–0.9)
IMM GRANULOCYTES NFR BLD AUTO: 2.3 % (ref 0–0.9)
INR PPP: 1.52 (ref 0.87–1.13)
INR PPP: 1.73 (ref 0.87–1.13)
INR PPP: 2.53 (ref 0.87–1.13)
INR PPP: 2.65 (ref 0.87–1.13)
INR PPP: 4.04 (ref 0.87–1.13)
IRON SATN MFR SERPL: 54 % (ref 15–55)
IRON SATN MFR SERPL: ABNORMAL % (ref 15–55)
IRON SERPL-MCNC: 62 UG/DL (ref 50–180)
IRON SERPL-MCNC: <5 UG/DL (ref 50–180)
KETONES UR STRIP.AUTO-MCNC: 15 MG/DL
KETONES UR STRIP.AUTO-MCNC: ABNORMAL MG/DL
LACTATE SERPL-SCNC: 1.5 MMOL/L (ref 0.5–2)
LACTATE SERPL-SCNC: 1.7 MMOL/L (ref 0.5–2)
LEUKOCYTE ESTERASE UR QL STRIP.AUTO: NEGATIVE
LEUKOCYTE ESTERASE UR QL STRIP.AUTO: NEGATIVE
LIPASE SERPL-CCNC: 127 U/L (ref 11–82)
LIPASE SERPL-CCNC: 249 U/L (ref 11–82)
LIPASE SERPL-CCNC: 270 U/L (ref 11–82)
LIPASE SERPL-CCNC: 468 U/L (ref 11–82)
LIPASE SERPL-CCNC: 697 U/L (ref 11–82)
LIPASE SERPL-CCNC: >3000 U/L (ref 11–82)
LV EJECT FRACT  99904: 65
LV EJECT FRACT MOD 2C 99903: 68.68
LV EJECT FRACT MOD 4C 99902: 63.37
LYMPHOCYTES # BLD AUTO: 0.42 K/UL (ref 1–4.8)
LYMPHOCYTES # BLD AUTO: 0.58 K/UL (ref 1–4.8)
LYMPHOCYTES # BLD AUTO: 0.61 K/UL (ref 1–4.8)
LYMPHOCYTES # BLD AUTO: 0.63 K/UL (ref 1–4.8)
LYMPHOCYTES # BLD AUTO: 0.69 K/UL (ref 1–4.8)
LYMPHOCYTES # BLD AUTO: 0.77 K/UL (ref 1–4.8)
LYMPHOCYTES # BLD AUTO: 0.81 K/UL (ref 1–4.8)
LYMPHOCYTES # BLD AUTO: 0.85 K/UL (ref 1–4.8)
LYMPHOCYTES # BLD AUTO: 0.86 K/UL (ref 1–4.8)
LYMPHOCYTES # BLD AUTO: 1.03 K/UL (ref 1–4.8)
LYMPHOCYTES # BLD AUTO: 1.05 K/UL (ref 1–4.8)
LYMPHOCYTES # BLD AUTO: 1.1 K/UL (ref 1–4.8)
LYMPHOCYTES NFR BLD: 2.7 % (ref 22–41)
LYMPHOCYTES NFR BLD: 3.6 % (ref 22–41)
LYMPHOCYTES NFR BLD: 4.1 % (ref 22–41)
LYMPHOCYTES NFR BLD: 4.1 % (ref 22–41)
LYMPHOCYTES NFR BLD: 4.3 % (ref 22–41)
LYMPHOCYTES NFR BLD: 4.4 % (ref 22–41)
LYMPHOCYTES NFR BLD: 4.6 % (ref 22–41)
LYMPHOCYTES NFR BLD: 4.6 % (ref 22–41)
LYMPHOCYTES NFR BLD: 5 % (ref 22–41)
LYMPHOCYTES NFR BLD: 5 % (ref 22–41)
LYMPHOCYTES NFR BLD: 5.8 % (ref 22–41)
LYMPHOCYTES NFR BLD: 6 % (ref 22–41)
LYMPHOCYTES NFR FLD: 5 %
LYMPHOCYTES NFR FLD: 8 %
MAGNESIUM SERPL-MCNC: 1.7 MG/DL (ref 1.5–2.5)
MAGNESIUM SERPL-MCNC: 2 MG/DL (ref 1.5–2.5)
MAGNESIUM SERPL-MCNC: 2 MG/DL (ref 1.5–2.5)
MAGNESIUM SERPL-MCNC: 2.1 MG/DL (ref 1.5–2.5)
MAGNESIUM SERPL-MCNC: 2.1 MG/DL (ref 1.5–2.5)
MAGNESIUM SERPL-MCNC: 2.2 MG/DL (ref 1.5–2.5)
MCH RBC QN AUTO: 32.3 PG (ref 27–33)
MCH RBC QN AUTO: 32.3 PG (ref 27–33)
MCH RBC QN AUTO: 32.6 PG (ref 27–33)
MCH RBC QN AUTO: 32.7 PG (ref 27–33)
MCH RBC QN AUTO: 32.8 PG (ref 27–33)
MCH RBC QN AUTO: 32.8 PG (ref 27–33)
MCH RBC QN AUTO: 32.9 PG (ref 27–33)
MCH RBC QN AUTO: 33 PG (ref 27–33)
MCH RBC QN AUTO: 33.1 PG (ref 27–33)
MCH RBC QN AUTO: 33.1 PG (ref 27–33)
MCH RBC QN AUTO: 33.2 PG (ref 27–33)
MCH RBC QN AUTO: 33.4 PG (ref 27–33)
MCH RBC QN AUTO: 33.5 PG (ref 27–33)
MCH RBC QN AUTO: 33.6 PG (ref 27–33)
MCH RBC QN AUTO: 33.9 PG (ref 27–33)
MCHC RBC AUTO-ENTMCNC: 32.4 G/DL (ref 32.3–36.5)
MCHC RBC AUTO-ENTMCNC: 32.6 G/DL (ref 32.3–36.5)
MCHC RBC AUTO-ENTMCNC: 33.2 G/DL (ref 32.3–36.5)
MCHC RBC AUTO-ENTMCNC: 33.3 G/DL (ref 32.3–36.5)
MCHC RBC AUTO-ENTMCNC: 33.5 G/DL (ref 32.3–36.5)
MCHC RBC AUTO-ENTMCNC: 33.6 G/DL (ref 32.3–36.5)
MCHC RBC AUTO-ENTMCNC: 34.2 G/DL (ref 32.3–36.5)
MCHC RBC AUTO-ENTMCNC: 34.3 G/DL (ref 32.3–36.5)
MCHC RBC AUTO-ENTMCNC: 34.4 G/DL (ref 32.3–36.5)
MCHC RBC AUTO-ENTMCNC: 34.4 G/DL (ref 32.3–36.5)
MCHC RBC AUTO-ENTMCNC: 34.7 G/DL (ref 32.3–36.5)
MCHC RBC AUTO-ENTMCNC: 34.9 G/DL (ref 32.3–36.5)
MCHC RBC AUTO-ENTMCNC: 35.3 G/DL (ref 32.3–36.5)
MCHC RBC AUTO-ENTMCNC: 35.3 G/DL (ref 32.3–36.5)
MCHC RBC AUTO-ENTMCNC: 35.4 G/DL (ref 32.3–36.5)
MCHC RBC AUTO-ENTMCNC: 35.4 G/DL (ref 32.3–36.5)
MCHC RBC AUTO-ENTMCNC: 35.6 G/DL (ref 32.3–36.5)
MCHC RBC AUTO-ENTMCNC: 36 G/DL (ref 32.3–36.5)
MCHC RBC AUTO-ENTMCNC: 36.7 G/DL (ref 32.3–36.5)
MCV RBC AUTO: 100 FL (ref 81.4–97.8)
MCV RBC AUTO: 101.1 FL (ref 81.4–97.8)
MCV RBC AUTO: 90.1 FL (ref 81.4–97.8)
MCV RBC AUTO: 92 FL (ref 81.4–97.8)
MCV RBC AUTO: 92.1 FL (ref 81.4–97.8)
MCV RBC AUTO: 92.4 FL (ref 81.4–97.8)
MCV RBC AUTO: 93.1 FL (ref 81.4–97.8)
MCV RBC AUTO: 93.1 FL (ref 81.4–97.8)
MCV RBC AUTO: 93.9 FL (ref 81.4–97.8)
MCV RBC AUTO: 94.4 FL (ref 81.4–97.8)
MCV RBC AUTO: 94.6 FL (ref 81.4–97.8)
MCV RBC AUTO: 95.3 FL (ref 81.4–97.8)
MCV RBC AUTO: 95.3 FL (ref 81.4–97.8)
MCV RBC AUTO: 95.9 FL (ref 81.4–97.8)
MCV RBC AUTO: 96.5 FL (ref 81.4–97.8)
MCV RBC AUTO: 97.2 FL (ref 81.4–97.8)
MCV RBC AUTO: 98.5 FL (ref 81.4–97.8)
MCV RBC AUTO: 98.6 FL (ref 81.4–97.8)
MCV RBC AUTO: 98.7 FL (ref 81.4–97.8)
MCV RBC AUTO: 99.7 FL (ref 81.4–97.8)
MCV RBC AUTO: 99.7 FL (ref 81.4–97.8)
MICRO URNS: ABNORMAL
MICRO URNS: ABNORMAL
MONOCYTES # BLD AUTO: 0.94 K/UL (ref 0–0.85)
MONOCYTES # BLD AUTO: 1.06 K/UL (ref 0–0.85)
MONOCYTES # BLD AUTO: 1.09 K/UL (ref 0–0.85)
MONOCYTES # BLD AUTO: 1.28 K/UL (ref 0–0.85)
MONOCYTES # BLD AUTO: 1.31 K/UL (ref 0–0.85)
MONOCYTES # BLD AUTO: 1.32 K/UL (ref 0–0.85)
MONOCYTES # BLD AUTO: 1.43 K/UL (ref 0–0.85)
MONOCYTES # BLD AUTO: 1.45 K/UL (ref 0–0.85)
MONOCYTES # BLD AUTO: 1.77 K/UL (ref 0–0.85)
MONOCYTES # BLD AUTO: 1.77 K/UL (ref 0–0.85)
MONOCYTES # BLD AUTO: 1.97 K/UL (ref 0–0.85)
MONOCYTES # BLD AUTO: 2.12 K/UL (ref 0–0.85)
MONOCYTES NFR BLD AUTO: 10.2 % (ref 0–13.4)
MONOCYTES NFR BLD AUTO: 12 % (ref 0–13.4)
MONOCYTES NFR BLD AUTO: 12.4 % (ref 0–13.4)
MONOCYTES NFR BLD AUTO: 4.8 % (ref 0–13.4)
MONOCYTES NFR BLD AUTO: 5.6 % (ref 0–13.4)
MONOCYTES NFR BLD AUTO: 6 % (ref 0–13.4)
MONOCYTES NFR BLD AUTO: 7.3 % (ref 0–13.4)
MONOCYTES NFR BLD AUTO: 8.4 % (ref 0–13.4)
MONOCYTES NFR BLD AUTO: 8.9 % (ref 0–13.4)
MONOCYTES NFR BLD AUTO: 9 % (ref 0–13.4)
MONOCYTES NFR BLD AUTO: 9.3 % (ref 0–13.4)
MONOCYTES NFR BLD AUTO: 9.3 % (ref 0–13.4)
MONOS+MACROS NFR FLD MANUAL: 19 %
MONOS+MACROS NFR FLD MANUAL: 46 %
MYCOBACTERIUM SPEC CULT: NORMAL
NEUTROPHILS # BLD AUTO: 11.76 K/UL (ref 1.82–7.42)
NEUTROPHILS # BLD AUTO: 12.19 K/UL (ref 1.82–7.42)
NEUTROPHILS # BLD AUTO: 12.34 K/UL (ref 1.82–7.42)
NEUTROPHILS # BLD AUTO: 14.89 K/UL (ref 1.82–7.42)
NEUTROPHILS # BLD AUTO: 15.1 K/UL (ref 1.82–7.42)
NEUTROPHILS # BLD AUTO: 17.1 K/UL (ref 1.82–7.42)
NEUTROPHILS # BLD AUTO: 18.01 K/UL (ref 1.82–7.42)
NEUTROPHILS # BLD AUTO: 22.44 K/UL (ref 1.82–7.42)
NEUTROPHILS # BLD AUTO: 37.14 K/UL (ref 1.82–7.42)
NEUTROPHILS # BLD AUTO: 8.31 K/UL (ref 1.82–7.42)
NEUTROPHILS # BLD AUTO: 9.3 K/UL (ref 1.82–7.42)
NEUTROPHILS # BLD AUTO: 9.9 K/UL (ref 1.82–7.42)
NEUTROPHILS NFR BLD: 80 % (ref 44–72)
NEUTROPHILS NFR BLD: 80.4 % (ref 44–72)
NEUTROPHILS NFR BLD: 81.8 % (ref 44–72)
NEUTROPHILS NFR BLD: 82.5 % (ref 44–72)
NEUTROPHILS NFR BLD: 83 % (ref 44–72)
NEUTROPHILS NFR BLD: 83.3 % (ref 44–72)
NEUTROPHILS NFR BLD: 84.7 % (ref 44–72)
NEUTROPHILS NFR BLD: 85 % (ref 44–72)
NEUTROPHILS NFR BLD: 85.9 % (ref 44–72)
NEUTROPHILS NFR BLD: 86.1 % (ref 44–72)
NEUTROPHILS NFR BLD: 87.3 % (ref 44–72)
NEUTROPHILS NFR BLD: 89.8 % (ref 44–72)
NEUTROPHILS NFR FLD: 41 %
NEUTROPHILS NFR FLD: 73 %
NITRITE UR QL STRIP.AUTO: POSITIVE
NITRITE UR QL STRIP.AUTO: POSITIVE
NRBC # BLD AUTO: 0 K/UL
NRBC BLD-RTO: 0 /100 WBC (ref 0–0.2)
NUC CELL # FLD: 1655 CELLS/UL
NUC CELL # FLD: 1830 CELLS/UL
OSMOLALITY SERPL: 288 MOSM/KG H2O (ref 278–298)
PH UR STRIP.AUTO: 5 [PH] (ref 5–8)
PH UR STRIP.AUTO: 5.5 [PH] (ref 5–8)
PHOSPHATE SERPL-MCNC: 4.3 MG/DL (ref 2.5–4.5)
PHOSPHATE SERPL-MCNC: 5.6 MG/DL (ref 2.5–4.5)
PHOSPHATE SERPL-MCNC: 7.1 MG/DL (ref 2.5–4.5)
PHOSPHATE SERPL-MCNC: 7.3 MG/DL (ref 2.5–4.5)
PHOSPHATE SERPL-MCNC: 7.6 MG/DL (ref 2.5–4.5)
PHOSPHATE SERPL-MCNC: 7.6 MG/DL (ref 2.5–4.5)
PHOSPHATE SERPL-MCNC: 7.7 MG/DL (ref 2.5–4.5)
PHOSPHATE SERPL-MCNC: 7.7 MG/DL (ref 2.5–4.5)
PHOSPHATE SERPL-MCNC: 7.9 MG/DL (ref 2.5–4.5)
PHOSPHATE SERPL-MCNC: 8 MG/DL (ref 2.5–4.5)
PHOSPHATE SERPL-MCNC: 8.1 MG/DL (ref 2.5–4.5)
PHOSPHATE SERPL-MCNC: 8.2 MG/DL (ref 2.5–4.5)
PLATELET # BLD AUTO: 100 K/UL (ref 164–446)
PLATELET # BLD AUTO: 101 K/UL (ref 164–446)
PLATELET # BLD AUTO: 103 K/UL (ref 164–446)
PLATELET # BLD AUTO: 105 K/UL (ref 164–446)
PLATELET # BLD AUTO: 107 K/UL (ref 164–446)
PLATELET # BLD AUTO: 118 K/UL (ref 164–446)
PLATELET # BLD AUTO: 120 K/UL (ref 164–446)
PLATELET # BLD AUTO: 121 K/UL (ref 164–446)
PLATELET # BLD AUTO: 127 K/UL (ref 164–446)
PLATELET # BLD AUTO: 169 K/UL (ref 164–446)
PLATELET # BLD AUTO: 238 K/UL (ref 164–446)
PLATELET # BLD AUTO: 245 K/UL (ref 164–446)
PLATELET # BLD AUTO: 59 K/UL (ref 164–446)
PLATELET # BLD AUTO: 60 K/UL (ref 164–446)
PLATELET # BLD AUTO: 61 K/UL (ref 164–446)
PLATELET # BLD AUTO: 62 K/UL (ref 164–446)
PLATELET # BLD AUTO: 77 K/UL (ref 164–446)
PLATELET # BLD AUTO: 85 K/UL (ref 164–446)
PLATELET # BLD AUTO: 87 K/UL (ref 164–446)
PLATELET # BLD AUTO: 90 K/UL (ref 164–446)
PLATELET # BLD AUTO: 91 K/UL (ref 164–446)
PLATELET BLD QL SMEAR: NORMAL
PLATELETS.RETICULATED NFR BLD AUTO: 10.3 % (ref 0.6–13.1)
PLATELETS.RETICULATED NFR BLD AUTO: 3.7 % (ref 0.6–13.1)
PLATELETS.RETICULATED NFR BLD AUTO: 4.6 % (ref 0.6–13.1)
PLATELETS.RETICULATED NFR BLD AUTO: 5.6 % (ref 0.6–13.1)
PLATELETS.RETICULATED NFR BLD AUTO: 5.7 % (ref 0.6–13.1)
PLATELETS.RETICULATED NFR BLD AUTO: 5.9 % (ref 0.6–13.1)
PLATELETS.RETICULATED NFR BLD AUTO: 5.9 % (ref 0.6–13.1)
PLATELETS.RETICULATED NFR BLD AUTO: 7.5 % (ref 0.6–13.1)
PLATELETS.RETICULATED NFR BLD AUTO: 7.7 % (ref 0.6–13.1)
PLATELETS.RETICULATED NFR BLD AUTO: 7.8 % (ref 0.6–13.1)
PLATELETS.RETICULATED NFR BLD AUTO: 7.9 % (ref 0.6–13.1)
PLATELETS.RETICULATED NFR BLD AUTO: 8.1 % (ref 0.6–13.1)
PLATELETS.RETICULATED NFR BLD AUTO: 9.3 % (ref 0.6–13.1)
PMV BLD AUTO: 10.4 FL (ref 9–12.9)
PMV BLD AUTO: 10.6 FL (ref 9–12.9)
PMV BLD AUTO: 10.6 FL (ref 9–12.9)
PMV BLD AUTO: 10.7 FL (ref 9–12.9)
PMV BLD AUTO: 10.9 FL (ref 9–12.9)
PMV BLD AUTO: 11 FL (ref 9–12.9)
PMV BLD AUTO: 11.2 FL (ref 9–12.9)
PMV BLD AUTO: 11.2 FL (ref 9–12.9)
PMV BLD AUTO: 11.4 FL (ref 9–12.9)
PMV BLD AUTO: 11.4 FL (ref 9–12.9)
PMV BLD AUTO: 11.5 FL (ref 9–12.9)
PMV BLD AUTO: 11.6 FL (ref 9–12.9)
PMV BLD AUTO: 11.7 FL (ref 9–12.9)
PMV BLD AUTO: 11.7 FL (ref 9–12.9)
PMV BLD AUTO: 11.8 FL (ref 9–12.9)
PMV BLD AUTO: 11.9 FL (ref 9–12.9)
PMV BLD AUTO: 12 FL (ref 9–12.9)
PMV BLD AUTO: 12.1 FL (ref 9–12.9)
PMV BLD AUTO: 12.2 FL (ref 9–12.9)
POTASSIUM SERPL-SCNC: 3.9 MMOL/L (ref 3.6–5.5)
POTASSIUM SERPL-SCNC: 4 MMOL/L (ref 3.6–5.5)
POTASSIUM SERPL-SCNC: 4.1 MMOL/L (ref 3.6–5.5)
POTASSIUM SERPL-SCNC: 4.2 MMOL/L (ref 3.6–5.5)
POTASSIUM SERPL-SCNC: 4.3 MMOL/L (ref 3.6–5.5)
POTASSIUM SERPL-SCNC: 4.4 MMOL/L (ref 3.6–5.5)
POTASSIUM SERPL-SCNC: 4.5 MMOL/L (ref 3.6–5.5)
POTASSIUM SERPL-SCNC: 4.6 MMOL/L (ref 3.6–5.5)
POTASSIUM SERPL-SCNC: 4.7 MMOL/L (ref 3.6–5.5)
POTASSIUM SERPL-SCNC: 4.7 MMOL/L (ref 3.6–5.5)
POTASSIUM SERPL-SCNC: 4.8 MMOL/L (ref 3.6–5.5)
POTASSIUM SERPL-SCNC: 4.9 MMOL/L (ref 3.6–5.5)
POTASSIUM SERPL-SCNC: 4.9 MMOL/L (ref 3.6–5.5)
POTASSIUM SERPL-SCNC: 5 MMOL/L (ref 3.6–5.5)
POTASSIUM SERPL-SCNC: 5.1 MMOL/L (ref 3.6–5.5)
POTASSIUM SERPL-SCNC: 5.2 MMOL/L (ref 3.6–5.5)
POTASSIUM SERPL-SCNC: 5.3 MMOL/L (ref 3.6–5.5)
POTASSIUM SERPL-SCNC: 5.3 MMOL/L (ref 3.6–5.5)
POTASSIUM SERPL-SCNC: 5.4 MMOL/L (ref 3.6–5.5)
POTASSIUM SERPL-SCNC: 5.9 MMOL/L (ref 3.6–5.5)
PROCALCITONIN SERPL-MCNC: 0.43 NG/ML
PROCALCITONIN SERPL-MCNC: 2.27 NG/ML
PROT FLD-MCNC: 0.9 G/DL
PROT SERPL-MCNC: 4.2 G/DL (ref 6–8.2)
PROT SERPL-MCNC: 4.4 G/DL (ref 6–8.2)
PROT SERPL-MCNC: 4.5 G/DL (ref 6–8.2)
PROT SERPL-MCNC: 4.7 G/DL (ref 6–8.2)
PROT SERPL-MCNC: 5.1 G/DL (ref 6–8.2)
PROT SERPL-MCNC: 5.1 G/DL (ref 6–8.2)
PROT SERPL-MCNC: 5.2 G/DL (ref 6–8.2)
PROT SERPL-MCNC: 5.2 G/DL (ref 6–8.2)
PROT SERPL-MCNC: 5.3 G/DL (ref 6–8.2)
PROT SERPL-MCNC: 5.5 G/DL (ref 6–8.2)
PROT SERPL-MCNC: 5.8 G/DL (ref 6–8.2)
PROT SERPL-MCNC: 5.9 G/DL (ref 6–8.2)
PROT SERPL-MCNC: 6 G/DL (ref 6–8.2)
PROT SERPL-MCNC: 6 G/DL (ref 6–8.2)
PROT UR QL STRIP: 100 MG/DL
PROT UR QL STRIP: 30 MG/DL
PROT UR-MCNC: 406 MG/DL (ref 0–15)
PROTHROMBIN TIME: 18.9 SEC (ref 12–14.6)
PROTHROMBIN TIME: 20.4 SEC (ref 12–14.6)
PROTHROMBIN TIME: 27.6 SEC (ref 12–14.6)
PROTHROMBIN TIME: 28.6 SEC (ref 12–14.6)
PROTHROMBIN TIME: 39.9 SEC (ref 12–14.6)
RBC # BLD AUTO: 2.67 M/UL (ref 4.7–6.1)
RBC # BLD AUTO: 2.78 M/UL (ref 4.7–6.1)
RBC # BLD AUTO: 2.85 M/UL (ref 4.7–6.1)
RBC # BLD AUTO: 2.89 M/UL (ref 4.7–6.1)
RBC # BLD AUTO: 2.91 M/UL (ref 4.7–6.1)
RBC # BLD AUTO: 2.93 M/UL (ref 4.7–6.1)
RBC # BLD AUTO: 2.96 M/UL (ref 4.7–6.1)
RBC # BLD AUTO: 3.01 M/UL (ref 4.7–6.1)
RBC # BLD AUTO: 3.04 M/UL (ref 4.7–6.1)
RBC # BLD AUTO: 3.13 M/UL (ref 4.7–6.1)
RBC # BLD AUTO: 3.14 M/UL (ref 4.7–6.1)
RBC # BLD AUTO: 3.14 M/UL (ref 4.7–6.1)
RBC # BLD AUTO: 3.16 M/UL (ref 4.7–6.1)
RBC # BLD AUTO: 3.21 M/UL (ref 4.7–6.1)
RBC # BLD AUTO: 3.22 M/UL (ref 4.7–6.1)
RBC # BLD AUTO: 3.36 M/UL (ref 4.7–6.1)
RBC # BLD AUTO: 3.39 M/UL (ref 4.7–6.1)
RBC # BLD AUTO: 3.53 M/UL (ref 4.7–6.1)
RBC # BLD AUTO: 3.67 M/UL (ref 4.7–6.1)
RBC # BLD AUTO: 3.7 M/UL (ref 4.7–6.1)
RBC # BLD AUTO: 3.76 M/UL (ref 4.7–6.1)
RBC # FLD: 6000 CELLS/UL
RBC # FLD: NORMAL CELLS/UL
RBC # URNS HPF: ABNORMAL /HPF
RBC # URNS HPF: ABNORMAL /HPF
RBC BLD AUTO: NORMAL
RBC UR QL AUTO: ABNORMAL
RBC UR QL AUTO: NEGATIVE
RENAL EPI CELLS #/AREA URNS HPF: ABNORMAL /HPF
RHODAMINE-AURAMINE STN SPEC: NORMAL
RHODAMINE-AURAMINE STN SPEC: NORMAL
SIGNIFICANT IND 70042: NORMAL
SITE SITE: NORMAL
SODIUM SERPL-SCNC: 117 MMOL/L (ref 135–145)
SODIUM SERPL-SCNC: 117 MMOL/L (ref 135–145)
SODIUM SERPL-SCNC: 118 MMOL/L (ref 135–145)
SODIUM SERPL-SCNC: 118 MMOL/L (ref 135–145)
SODIUM SERPL-SCNC: 119 MMOL/L (ref 135–145)
SODIUM SERPL-SCNC: 120 MMOL/L (ref 135–145)
SODIUM SERPL-SCNC: 121 MMOL/L (ref 135–145)
SODIUM SERPL-SCNC: 121 MMOL/L (ref 135–145)
SODIUM SERPL-SCNC: 122 MMOL/L (ref 135–145)
SODIUM SERPL-SCNC: 123 MMOL/L (ref 135–145)
SODIUM SERPL-SCNC: 124 MMOL/L (ref 135–145)
SODIUM SERPL-SCNC: 125 MMOL/L (ref 135–145)
SODIUM SERPL-SCNC: 125 MMOL/L (ref 135–145)
SODIUM SERPL-SCNC: 127 MMOL/L (ref 135–145)
SODIUM SERPL-SCNC: 127 MMOL/L (ref 135–145)
SODIUM SERPL-SCNC: 128 MMOL/L (ref 135–145)
SODIUM SERPL-SCNC: 128 MMOL/L (ref 135–145)
SODIUM SERPL-SCNC: 129 MMOL/L (ref 135–145)
SODIUM SERPL-SCNC: 130 MMOL/L (ref 135–145)
SODIUM SERPL-SCNC: 131 MMOL/L (ref 135–145)
SODIUM SERPL-SCNC: 133 MMOL/L (ref 135–145)
SODIUM SERPL-SCNC: 133 MMOL/L (ref 135–145)
SODIUM SERPL-SCNC: 134 MMOL/L (ref 135–145)
SODIUM UR-SCNC: 21 MMOL/L
SODIUM UR-SCNC: <20 MMOL/L
SOURCE SOURCE: NORMAL
SP GR UR STRIP.AUTO: 1.02
SP GR UR STRIP.AUTO: >=1.03
SPECIMEN SOURCE: NORMAL
TIBC SERPL-MCNC: 115 UG/DL (ref 250–450)
TIBC SERPL-MCNC: ABNORMAL UG/DL (ref 250–450)
TSH SERPL DL<=0.005 MIU/L-ACNC: 2.74 UIU/ML (ref 0.38–5.33)
UIBC SERPL-MCNC: 162 UG/DL (ref 110–370)
UIBC SERPL-MCNC: 53 UG/DL (ref 110–370)
UROBILINOGEN UR STRIP.AUTO-MCNC: 0.2 MG/DL
VIT B12 SERPL-MCNC: >4000 PG/ML (ref 211–911)
WBC # BLD AUTO: 10.2 K/UL (ref 4.8–10.8)
WBC # BLD AUTO: 11.6 K/UL (ref 4.8–10.8)
WBC # BLD AUTO: 11.7 K/UL (ref 4.8–10.8)
WBC # BLD AUTO: 14.6 K/UL (ref 4.8–10.8)
WBC # BLD AUTO: 14.7 K/UL (ref 4.8–10.8)
WBC # BLD AUTO: 14.9 K/UL (ref 4.8–10.8)
WBC # BLD AUTO: 17.5 K/UL (ref 4.8–10.8)
WBC # BLD AUTO: 17.5 K/UL (ref 4.8–10.8)
WBC # BLD AUTO: 20.7 K/UL (ref 4.8–10.8)
WBC # BLD AUTO: 21 K/UL (ref 4.8–10.8)
WBC # BLD AUTO: 25.7 K/UL (ref 4.8–10.8)
WBC # BLD AUTO: 41.3 K/UL (ref 4.8–10.8)
WBC # BLD AUTO: 46.5 K/UL (ref 4.8–10.8)
WBC # BLD AUTO: 5.3 K/UL (ref 4.8–10.8)
WBC # BLD AUTO: 5.3 K/UL (ref 4.8–10.8)
WBC # BLD AUTO: 5.4 K/UL (ref 4.8–10.8)
WBC # BLD AUTO: 5.8 K/UL (ref 4.8–10.8)
WBC # BLD AUTO: 56.4 K/UL (ref 4.8–10.8)
WBC # BLD AUTO: 7.5 K/UL (ref 4.8–10.8)
WBC # BLD AUTO: 8.2 K/UL (ref 4.8–10.8)
WBC # BLD AUTO: 9.6 K/UL (ref 4.8–10.8)
WBC #/AREA URNS HPF: ABNORMAL /HPF
WBC #/AREA URNS HPF: ABNORMAL /HPF

## 2024-01-01 PROCEDURE — A9270 NON-COVERED ITEM OR SERVICE: HCPCS | Performed by: INTERNAL MEDICINE

## 2024-01-01 PROCEDURE — 84100 ASSAY OF PHOSPHORUS: CPT

## 2024-01-01 PROCEDURE — 700105 HCHG RX REV CODE 258: Performed by: INTERNAL MEDICINE

## 2024-01-01 PROCEDURE — 99292 CRITICAL CARE ADDL 30 MIN: CPT | Performed by: INTERNAL MEDICINE

## 2024-01-01 PROCEDURE — 85055 RETICULATED PLATELET ASSAY: CPT

## 2024-01-01 PROCEDURE — 90935 HEMODIALYSIS ONE EVALUATION: CPT

## 2024-01-01 PROCEDURE — 700102 HCHG RX REV CODE 250 W/ 637 OVERRIDE(OP): Performed by: HOSPITALIST

## 2024-01-01 PROCEDURE — 80053 COMPREHEN METABOLIC PANEL: CPT

## 2024-01-01 PROCEDURE — 700102 HCHG RX REV CODE 250 W/ 637 OVERRIDE(OP): Performed by: INTERNAL MEDICINE

## 2024-01-01 PROCEDURE — 700101 HCHG RX REV CODE 250: Performed by: INTERNAL MEDICINE

## 2024-01-01 PROCEDURE — 700111 HCHG RX REV CODE 636 W/ 250 OVERRIDE (IP): Performed by: INTERNAL MEDICINE

## 2024-01-01 PROCEDURE — 0F798ZZ DILATION OF COMMON BILE DUCT, VIA NATURAL OR ARTIFICIAL OPENING ENDOSCOPIC: ICD-10-PCS | Performed by: INTERNAL MEDICINE

## 2024-01-01 PROCEDURE — 83690 ASSAY OF LIPASE: CPT

## 2024-01-01 PROCEDURE — P9047 ALBUMIN (HUMAN), 25%, 50ML: HCPCS | Mod: JZ | Performed by: INTERNAL MEDICINE

## 2024-01-01 PROCEDURE — 700111 HCHG RX REV CODE 636 W/ 250 OVERRIDE (IP): Mod: JZ | Performed by: INTERNAL MEDICINE

## 2024-01-01 PROCEDURE — 160009 HCHG ANES TIME/MIN: Performed by: INTERNAL MEDICINE

## 2024-01-01 PROCEDURE — 82533 TOTAL CORTISOL: CPT

## 2024-01-01 PROCEDURE — 82570 ASSAY OF URINE CREATININE: CPT

## 2024-01-01 PROCEDURE — 87340 HEPATITIS B SURFACE AG IA: CPT

## 2024-01-01 PROCEDURE — 82140 ASSAY OF AMMONIA: CPT

## 2024-01-01 PROCEDURE — 80048 BASIC METABOLIC PNL TOTAL CA: CPT | Mod: 91

## 2024-01-01 PROCEDURE — 99232 SBSQ HOSP IP/OBS MODERATE 35: CPT | Performed by: HOSPITALIST

## 2024-01-01 PROCEDURE — 85027 COMPLETE CBC AUTOMATED: CPT

## 2024-01-01 PROCEDURE — 770000 HCHG ROOM/CARE - INTERMEDIATE ICU *

## 2024-01-01 PROCEDURE — 94760 N-INVAS EAR/PLS OXIMETRY 1: CPT

## 2024-01-01 PROCEDURE — 770001 HCHG ROOM/CARE - MED/SURG/GYN PRIV*

## 2024-01-01 PROCEDURE — 700102 HCHG RX REV CODE 250 W/ 637 OVERRIDE(OP): Performed by: NURSE PRACTITIONER

## 2024-01-01 PROCEDURE — 83735 ASSAY OF MAGNESIUM: CPT

## 2024-01-01 PROCEDURE — 99233 SBSQ HOSP IP/OBS HIGH 50: CPT | Performed by: INTERNAL MEDICINE

## 2024-01-01 PROCEDURE — 700101 HCHG RX REV CODE 250

## 2024-01-01 PROCEDURE — 700111 HCHG RX REV CODE 636 W/ 250 OVERRIDE (IP): Performed by: ANESTHESIOLOGY

## 2024-01-01 PROCEDURE — A9270 NON-COVERED ITEM OR SERVICE: HCPCS | Performed by: HOSPITALIST

## 2024-01-01 PROCEDURE — 36415 COLL VENOUS BLD VENIPUNCTURE: CPT

## 2024-01-01 PROCEDURE — 700111 HCHG RX REV CODE 636 W/ 250 OVERRIDE (IP): Mod: JZ | Performed by: FAMILY MEDICINE

## 2024-01-01 PROCEDURE — 700105 HCHG RX REV CODE 258: Performed by: ANESTHESIOLOGY

## 2024-01-01 PROCEDURE — 84100 ASSAY OF PHOSPHORUS: CPT | Mod: 91

## 2024-01-01 PROCEDURE — 99291 CRITICAL CARE FIRST HOUR: CPT | Performed by: INTERNAL MEDICINE

## 2024-01-01 PROCEDURE — 87070 CULTURE OTHR SPECIMN AEROBIC: CPT

## 2024-01-01 PROCEDURE — 99223 1ST HOSP IP/OBS HIGH 75: CPT | Mod: 25,AI | Performed by: HOSPITALIST

## 2024-01-01 PROCEDURE — 84300 ASSAY OF URINE SODIUM: CPT

## 2024-01-01 PROCEDURE — 76705 ECHO EXAM OF ABDOMEN: CPT

## 2024-01-01 PROCEDURE — 99291 CRITICAL CARE FIRST HOUR: CPT | Performed by: HOSPITALIST

## 2024-01-01 PROCEDURE — 85025 COMPLETE CBC W/AUTO DIFF WBC: CPT

## 2024-01-01 PROCEDURE — 85025 COMPLETE CBC W/AUTO DIFF WBC: CPT | Mod: 91

## 2024-01-01 PROCEDURE — 84156 ASSAY OF PROTEIN URINE: CPT

## 2024-01-01 PROCEDURE — 74328 X-RAY BILE DUCT ENDOSCOPY: CPT

## 2024-01-01 PROCEDURE — 86301 IMMUNOASSAY TUMOR CA 19-9: CPT

## 2024-01-01 PROCEDURE — 82105 ALPHA-FETOPROTEIN SERUM: CPT

## 2024-01-01 PROCEDURE — 86704 HEP B CORE ANTIBODY TOTAL: CPT

## 2024-01-01 PROCEDURE — 0W9G3ZZ DRAINAGE OF PERITONEAL CAVITY, PERCUTANEOUS APPROACH: ICD-10-PCS

## 2024-01-01 PROCEDURE — 160208 HCHG ENDO MINUTES - EA ADDL 1 MIN LEVEL 4: Performed by: INTERNAL MEDICINE

## 2024-01-01 PROCEDURE — 99232 SBSQ HOSP IP/OBS MODERATE 35: CPT | Performed by: INTERNAL MEDICINE

## 2024-01-01 PROCEDURE — 93970 EXTREMITY STUDY: CPT

## 2024-01-01 PROCEDURE — 87102 FUNGUS ISOLATION CULTURE: CPT

## 2024-01-01 PROCEDURE — 700111 HCHG RX REV CODE 636 W/ 250 OVERRIDE (IP): Performed by: NURSE PRACTITIONER

## 2024-01-01 PROCEDURE — 82607 VITAMIN B-12: CPT

## 2024-01-01 PROCEDURE — 84157 ASSAY OF PROTEIN OTHER: CPT

## 2024-01-01 PROCEDURE — 87040 BLOOD CULTURE FOR BACTERIA: CPT

## 2024-01-01 PROCEDURE — 82247 BILIRUBIN TOTAL: CPT

## 2024-01-01 PROCEDURE — 700111 HCHG RX REV CODE 636 W/ 250 OVERRIDE (IP)

## 2024-01-01 PROCEDURE — 700105 HCHG RX REV CODE 258: Performed by: HOSPITALIST

## 2024-01-01 PROCEDURE — 770022 HCHG ROOM/CARE - ICU (200)

## 2024-01-01 PROCEDURE — C1769 GUIDE WIRE: HCPCS | Performed by: INTERNAL MEDICINE

## 2024-01-01 PROCEDURE — 84145 PROCALCITONIN (PCT): CPT

## 2024-01-01 PROCEDURE — 93306 TTE W/DOPPLER COMPLETE: CPT | Mod: 26 | Performed by: INTERNAL MEDICINE

## 2024-01-01 PROCEDURE — 700105 HCHG RX REV CODE 258: Performed by: FAMILY MEDICINE

## 2024-01-01 PROCEDURE — 85610 PROTHROMBIN TIME: CPT

## 2024-01-01 PROCEDURE — A9270 NON-COVERED ITEM OR SERVICE: HCPCS | Performed by: FAMILY MEDICINE

## 2024-01-01 PROCEDURE — 89051 BODY FLUID CELL COUNT: CPT

## 2024-01-01 PROCEDURE — 81001 URINALYSIS AUTO W/SCOPE: CPT

## 2024-01-01 PROCEDURE — 82040 ASSAY OF SERUM ALBUMIN: CPT

## 2024-01-01 PROCEDURE — 02H633Z INSERTION OF INFUSION DEVICE INTO RIGHT ATRIUM, PERCUTANEOUS APPROACH: ICD-10-PCS | Performed by: RADIOLOGY

## 2024-01-01 PROCEDURE — 770006 HCHG ROOM/CARE - MED/SURG/GYN SEMI*

## 2024-01-01 PROCEDURE — 83735 ASSAY OF MAGNESIUM: CPT | Mod: 91

## 2024-01-01 PROCEDURE — 5A1D70Z PERFORMANCE OF URINARY FILTRATION, INTERMITTENT, LESS THAN 6 HOURS PER DAY: ICD-10-PCS | Performed by: INTERNAL MEDICINE

## 2024-01-01 PROCEDURE — A9270 NON-COVERED ITEM OR SERVICE: HCPCS | Performed by: NURSE PRACTITIONER

## 2024-01-01 PROCEDURE — 700111 HCHG RX REV CODE 636 W/ 250 OVERRIDE (IP): Performed by: STUDENT IN AN ORGANIZED HEALTH CARE EDUCATION/TRAINING PROGRAM

## 2024-01-01 PROCEDURE — 93976 VASCULAR STUDY: CPT

## 2024-01-01 PROCEDURE — 83540 ASSAY OF IRON: CPT

## 2024-01-01 PROCEDURE — 82330 ASSAY OF CALCIUM: CPT

## 2024-01-01 PROCEDURE — 82042 OTHER SOURCE ALBUMIN QUAN EA: CPT

## 2024-01-01 PROCEDURE — 82962 GLUCOSE BLOOD TEST: CPT

## 2024-01-01 PROCEDURE — C1889 IMPLANT/INSERT DEVICE, NOC: HCPCS | Performed by: INTERNAL MEDICINE

## 2024-01-01 PROCEDURE — 87070 CULTURE OTHR SPECIMN AEROBIC: CPT | Mod: 91

## 2024-01-01 PROCEDURE — 84443 ASSAY THYROID STIM HORMONE: CPT

## 2024-01-01 PROCEDURE — 49082 ABD PARACENTESIS: CPT

## 2024-01-01 PROCEDURE — 93005 ELECTROCARDIOGRAM TRACING: CPT | Performed by: NURSE PRACTITIONER

## 2024-01-01 PROCEDURE — 99232 SBSQ HOSP IP/OBS MODERATE 35: CPT | Performed by: FAMILY MEDICINE

## 2024-01-01 PROCEDURE — 700111 HCHG RX REV CODE 636 W/ 250 OVERRIDE (IP): Mod: JZ | Performed by: HOSPITALIST

## 2024-01-01 PROCEDURE — 71045 X-RAY EXAM CHEST 1 VIEW: CPT

## 2024-01-01 PROCEDURE — 82150 ASSAY OF AMYLASE: CPT

## 2024-01-01 PROCEDURE — 0JH63XZ INSERTION OF TUNNELED VASCULAR ACCESS DEVICE INTO CHEST SUBCUTANEOUS TISSUE AND FASCIA, PERCUTANEOUS APPROACH: ICD-10-PCS | Performed by: RADIOLOGY

## 2024-01-01 PROCEDURE — 83550 IRON BINDING TEST: CPT

## 2024-01-01 PROCEDURE — 82728 ASSAY OF FERRITIN: CPT

## 2024-01-01 PROCEDURE — 87205 SMEAR GRAM STAIN: CPT | Mod: 91

## 2024-01-01 PROCEDURE — 74181 MRI ABDOMEN W/O CONTRAST: CPT

## 2024-01-01 PROCEDURE — 51798 US URINE CAPACITY MEASURE: CPT

## 2024-01-01 PROCEDURE — 160035 HCHG PACU - 1ST 60 MINS PHASE I: Performed by: INTERNAL MEDICINE

## 2024-01-01 PROCEDURE — 87205 SMEAR GRAM STAIN: CPT

## 2024-01-01 PROCEDURE — 87116 MYCOBACTERIA CULTURE: CPT

## 2024-01-01 PROCEDURE — 99406 BEHAV CHNG SMOKING 3-10 MIN: CPT | Performed by: HOSPITALIST

## 2024-01-01 PROCEDURE — 700102 HCHG RX REV CODE 250 W/ 637 OVERRIDE(OP): Performed by: FAMILY MEDICINE

## 2024-01-01 PROCEDURE — 83930 ASSAY OF BLOOD OSMOLALITY: CPT

## 2024-01-01 PROCEDURE — 80053 COMPREHEN METABOLIC PANEL: CPT | Mod: 91

## 2024-01-01 PROCEDURE — 87206 SMEAR FLUORESCENT/ACID STAI: CPT

## 2024-01-01 PROCEDURE — 99291 CRITICAL CARE FIRST HOUR: CPT | Mod: 25 | Performed by: INTERNAL MEDICINE

## 2024-01-01 PROCEDURE — 87040 BLOOD CULTURE FOR BACTERIA: CPT | Mod: 91

## 2024-01-01 PROCEDURE — 93306 TTE W/DOPPLER COMPLETE: CPT

## 2024-01-01 PROCEDURE — C1729 CATH, DRAINAGE: HCPCS

## 2024-01-01 PROCEDURE — 700101 HCHG RX REV CODE 250: Performed by: NURSE PRACTITIONER

## 2024-01-01 PROCEDURE — 83605 ASSAY OF LACTIC ACID: CPT

## 2024-01-01 PROCEDURE — 700111 HCHG RX REV CODE 636 W/ 250 OVERRIDE (IP): Mod: JZ

## 2024-01-01 PROCEDURE — 97163 PT EVAL HIGH COMPLEX 45 MIN: CPT

## 2024-01-01 PROCEDURE — 80048 BASIC METABOLIC PNL TOTAL CA: CPT

## 2024-01-01 PROCEDURE — 306317 STOCKING THIGH HI XXLRG LNG: Performed by: INTERNAL MEDICINE

## 2024-01-01 PROCEDURE — 87015 SPECIMEN INFECT AGNT CONCNTJ: CPT

## 2024-01-01 PROCEDURE — 86706 HEP B SURFACE ANTIBODY: CPT

## 2024-01-01 PROCEDURE — C1750 CATH, HEMODIALYSIS,LONG-TERM: HCPCS

## 2024-01-01 PROCEDURE — 160203 HCHG ENDO MINUTES - 1ST 30 MINS LEVEL 4: Performed by: INTERNAL MEDICINE

## 2024-01-01 PROCEDURE — 160048 HCHG OR STATISTICAL LEVEL 1-5: Performed by: INTERNAL MEDICINE

## 2024-01-01 PROCEDURE — 49083 ABD PARACENTESIS W/IMAGING: CPT

## 2024-01-01 PROCEDURE — 700101 HCHG RX REV CODE 250: Performed by: ANESTHESIOLOGY

## 2024-01-01 PROCEDURE — 93010 ELECTROCARDIOGRAM REPORT: CPT | Performed by: STUDENT IN AN ORGANIZED HEALTH CARE EDUCATION/TRAINING PROGRAM

## 2024-01-01 PROCEDURE — 99239 HOSP IP/OBS DSCHRG MGMT >30: CPT | Performed by: INTERNAL MEDICINE

## 2024-01-01 PROCEDURE — 82378 CARCINOEMBRYONIC ANTIGEN: CPT

## 2024-01-01 PROCEDURE — RXMED WILLOW AMBULATORY MEDICATION CHARGE: Performed by: INTERNAL MEDICINE

## 2024-01-01 PROCEDURE — 160002 HCHG RECOVERY MINUTES (STAT): Performed by: INTERNAL MEDICINE

## 2024-01-01 PROCEDURE — 76775 US EXAM ABDO BACK WALL LIM: CPT

## 2024-01-01 PROCEDURE — 99233 SBSQ HOSP IP/OBS HIGH 50: CPT | Performed by: HOSPITALIST

## 2024-01-01 RX ORDER — MIDODRINE HYDROCHLORIDE 5 MG/1
15 TABLET ORAL
Status: DISCONTINUED | OUTPATIENT
Start: 2024-01-01 | End: 2024-01-01

## 2024-01-01 RX ORDER — OXYCODONE HYDROCHLORIDE 10 MG/1
10 TABLET ORAL
Status: DISCONTINUED | OUTPATIENT
Start: 2024-01-01 | End: 2024-01-01

## 2024-01-01 RX ORDER — MIDAZOLAM HYDROCHLORIDE 1 MG/ML
INJECTION INTRAMUSCULAR; INTRAVENOUS
Status: COMPLETED
Start: 2024-01-01 | End: 2024-01-01

## 2024-01-01 RX ORDER — GABAPENTIN 300 MG/1
300 CAPSULE ORAL 2 TIMES DAILY
COMMUNITY

## 2024-01-01 RX ORDER — ALBUMIN (HUMAN) 12.5 G/50ML
50 SOLUTION INTRAVENOUS ONCE
Status: COMPLETED | OUTPATIENT
Start: 2024-01-01 | End: 2024-01-01

## 2024-01-01 RX ORDER — OXYCODONE HYDROCHLORIDE 5 MG/1
5 TABLET ORAL EVERY 4 HOURS PRN
Status: DISCONTINUED | OUTPATIENT
Start: 2024-01-01 | End: 2024-01-01

## 2024-01-01 RX ORDER — ALBUMIN (HUMAN) 12.5 G/50ML
75 SOLUTION INTRAVENOUS ONCE
Status: COMPLETED | OUTPATIENT
Start: 2024-01-01 | End: 2024-01-01

## 2024-01-01 RX ORDER — LORAZEPAM 2 MG/ML
1 CONCENTRATE ORAL
Status: DISCONTINUED | OUTPATIENT
Start: 2024-01-01 | End: 2024-01-01

## 2024-01-01 RX ORDER — SODIUM CHLORIDE 9 MG/ML
INJECTION, SOLUTION INTRAVENOUS CONTINUOUS
Status: DISCONTINUED | OUTPATIENT
Start: 2024-01-01 | End: 2024-01-01

## 2024-01-01 RX ORDER — SODIUM CHLORIDE 9 MG/ML
250 INJECTION, SOLUTION INTRAVENOUS
Status: DISCONTINUED | OUTPATIENT
Start: 2024-01-01 | End: 2024-01-01

## 2024-01-01 RX ORDER — CALCIUM CARBONATE 500 MG/1
500 TABLET, CHEWABLE ORAL 2 TIMES DAILY
Status: DISCONTINUED | OUTPATIENT
Start: 2024-01-01 | End: 2024-01-01

## 2024-01-01 RX ORDER — SPIRONOLACTONE 25 MG/1
25 TABLET ORAL DAILY
Status: DISCONTINUED | OUTPATIENT
Start: 2024-01-01 | End: 2024-01-01

## 2024-01-01 RX ORDER — HEPARIN SODIUM 1000 [USP'U]/ML
INJECTION, SOLUTION INTRAVENOUS; SUBCUTANEOUS
Status: COMPLETED
Start: 2024-01-01 | End: 2024-01-01

## 2024-01-01 RX ORDER — TRAZODONE HYDROCHLORIDE 50 MG/1
50 TABLET ORAL NIGHTLY
COMMUNITY

## 2024-01-01 RX ORDER — SODIUM CHLORIDE, SODIUM LACTATE, POTASSIUM CHLORIDE, CALCIUM CHLORIDE 600; 310; 30; 20 MG/100ML; MG/100ML; MG/100ML; MG/100ML
INJECTION, SOLUTION INTRAVENOUS CONTINUOUS
Status: DISCONTINUED | OUTPATIENT
Start: 2024-01-01 | End: 2024-01-01 | Stop reason: HOSPADM

## 2024-01-01 RX ORDER — ALBUMIN (HUMAN) 12.5 G/50ML
100 SOLUTION INTRAVENOUS ONCE
Status: DISCONTINUED | OUTPATIENT
Start: 2024-01-01 | End: 2024-01-01

## 2024-01-01 RX ORDER — SODIUM BICARBONATE 650 MG/1
650 TABLET ORAL 3 TIMES DAILY
Status: DISCONTINUED | OUTPATIENT
Start: 2024-01-01 | End: 2024-01-01

## 2024-01-01 RX ORDER — LACTULOSE 20 G/30ML
10 SOLUTION ORAL
Status: DISCONTINUED | OUTPATIENT
Start: 2024-01-01 | End: 2024-01-01 | Stop reason: HOSPADM

## 2024-01-01 RX ORDER — LIDOCAINE HYDROCHLORIDE AND EPINEPHRINE 10; 10 MG/ML; UG/ML
INJECTION, SOLUTION INFILTRATION; PERINEURAL
Status: COMPLETED
Start: 2024-01-01 | End: 2024-01-01

## 2024-01-01 RX ORDER — OXYCODONE HYDROCHLORIDE 5 MG/1
5 TABLET ORAL
Status: DISCONTINUED | OUTPATIENT
Start: 2024-01-01 | End: 2024-01-01

## 2024-01-01 RX ORDER — SODIUM CHLORIDE, SODIUM LACTATE, POTASSIUM CHLORIDE, AND CALCIUM CHLORIDE .6; .31; .03; .02 G/100ML; G/100ML; G/100ML; G/100ML
500 INJECTION, SOLUTION INTRAVENOUS
Status: DISCONTINUED | OUTPATIENT
Start: 2024-01-01 | End: 2024-01-01

## 2024-01-01 RX ORDER — HALOPERIDOL 5 MG/ML
5 INJECTION INTRAMUSCULAR
Status: DISCONTINUED | OUTPATIENT
Start: 2024-01-01 | End: 2024-01-01 | Stop reason: HOSPADM

## 2024-01-01 RX ORDER — CALCIUM ACETATE 667 MG/1
667 TABLET ORAL
Status: DISCONTINUED | OUTPATIENT
Start: 2024-01-01 | End: 2024-01-01

## 2024-01-01 RX ORDER — MIDODRINE HYDROCHLORIDE 5 MG/1
10 TABLET ORAL ONCE
Status: COMPLETED | OUTPATIENT
Start: 2024-01-01 | End: 2024-01-01

## 2024-01-01 RX ORDER — TRAZODONE HYDROCHLORIDE 50 MG/1
50 TABLET ORAL
Status: DISCONTINUED | OUTPATIENT
Start: 2024-01-01 | End: 2024-01-01 | Stop reason: HOSPADM

## 2024-01-01 RX ORDER — FUROSEMIDE 40 MG/1
40 TABLET ORAL
COMMUNITY

## 2024-01-01 RX ORDER — ALBUMIN (HUMAN) 12.5 G/50ML
25 SOLUTION INTRAVENOUS EVERY 6 HOURS
Status: COMPLETED | OUTPATIENT
Start: 2024-01-01 | End: 2024-01-01

## 2024-01-01 RX ORDER — AMOXICILLIN 250 MG
2 CAPSULE ORAL 2 TIMES DAILY
Qty: 30 TABLET | Refills: 0 | Status: SHIPPED | OUTPATIENT
Start: 2024-01-01

## 2024-01-01 RX ORDER — HYDROMORPHONE HYDROCHLORIDE 1 MG/ML
0.5 INJECTION, SOLUTION INTRAMUSCULAR; INTRAVENOUS; SUBCUTANEOUS
Status: DISCONTINUED | OUTPATIENT
Start: 2024-01-01 | End: 2024-01-01

## 2024-01-01 RX ORDER — HYDROMORPHONE HYDROCHLORIDE 1 MG/ML
0.25 INJECTION, SOLUTION INTRAMUSCULAR; INTRAVENOUS; SUBCUTANEOUS
Status: DISCONTINUED | OUTPATIENT
Start: 2024-01-01 | End: 2024-01-01

## 2024-01-01 RX ORDER — SODIUM CHLORIDE 9 MG/ML
500 INJECTION, SOLUTION INTRAVENOUS
Status: ACTIVE | OUTPATIENT
Start: 2024-01-01 | End: 2024-01-01

## 2024-01-01 RX ORDER — GLYCOPYRROLATE 0.2 MG/ML
0.2 INJECTION INTRAMUSCULAR; INTRAVENOUS 3 TIMES DAILY PRN
Status: DISCONTINUED | OUTPATIENT
Start: 2024-01-01 | End: 2024-01-01 | Stop reason: HOSPADM

## 2024-01-01 RX ORDER — PROCHLORPERAZINE EDISYLATE 5 MG/ML
5-10 INJECTION INTRAMUSCULAR; INTRAVENOUS EVERY 4 HOURS PRN
Status: DISCONTINUED | OUTPATIENT
Start: 2024-01-01 | End: 2024-01-01 | Stop reason: HOSPADM

## 2024-01-01 RX ORDER — LORAZEPAM 2 MG/ML
1 INJECTION INTRAMUSCULAR
Status: DISCONTINUED | OUTPATIENT
Start: 2024-01-01 | End: 2024-01-01

## 2024-01-01 RX ORDER — LORAZEPAM 2 MG/ML
2 INJECTION INTRAMUSCULAR
Status: DISCONTINUED | OUTPATIENT
Start: 2024-01-01 | End: 2024-01-01 | Stop reason: HOSPADM

## 2024-01-01 RX ORDER — POLYETHYLENE GLYCOL 3350 17 G/17G
1 POWDER, FOR SOLUTION ORAL
Status: DISCONTINUED | OUTPATIENT
Start: 2024-01-01 | End: 2024-01-01 | Stop reason: HOSPADM

## 2024-01-01 RX ORDER — PROMETHAZINE HYDROCHLORIDE 25 MG/1
12.5-25 TABLET ORAL EVERY 4 HOURS PRN
Status: DISCONTINUED | OUTPATIENT
Start: 2024-01-01 | End: 2024-01-01

## 2024-01-01 RX ORDER — OXYCODONE HYDROCHLORIDE 5 MG/1
5 TABLET ORAL EVERY 12 HOURS PRN
Qty: 8 TABLET | Refills: 0 | Status: SHIPPED | OUTPATIENT
Start: 2024-01-01 | End: 2024-01-01

## 2024-01-01 RX ORDER — ATROPINE SULFATE 10 MG/ML
2 SOLUTION/ DROPS OPHTHALMIC EVERY 4 HOURS PRN
Status: DISCONTINUED | OUTPATIENT
Start: 2024-01-01 | End: 2024-01-01 | Stop reason: HOSPADM

## 2024-01-01 RX ORDER — FUROSEMIDE 40 MG/1
40 TABLET ORAL
Status: DISCONTINUED | OUTPATIENT
Start: 2024-01-01 | End: 2024-01-01

## 2024-01-01 RX ORDER — LISINOPRIL 10 MG/1
10 TABLET ORAL DAILY
COMMUNITY

## 2024-01-01 RX ORDER — MEPERIDINE HYDROCHLORIDE 25 MG/ML
6.25 INJECTION INTRAMUSCULAR; INTRAVENOUS; SUBCUTANEOUS
Status: DISCONTINUED | OUTPATIENT
Start: 2024-01-01 | End: 2024-01-01 | Stop reason: HOSPADM

## 2024-01-01 RX ORDER — SPIRONOLACTONE 25 MG/1
25 TABLET ORAL DAILY
COMMUNITY

## 2024-01-01 RX ORDER — ONDANSETRON 2 MG/ML
4 INJECTION INTRAMUSCULAR; INTRAVENOUS EVERY 4 HOURS PRN
Status: DISCONTINUED | OUTPATIENT
Start: 2024-01-01 | End: 2024-01-01

## 2024-01-01 RX ORDER — ONDANSETRON 4 MG/1
8 TABLET, ORALLY DISINTEGRATING ORAL EVERY 8 HOURS PRN
Status: DISCONTINUED | OUTPATIENT
Start: 2024-01-01 | End: 2024-01-01 | Stop reason: HOSPADM

## 2024-01-01 RX ORDER — MIDAZOLAM HYDROCHLORIDE 1 MG/ML
.5-2 INJECTION INTRAMUSCULAR; INTRAVENOUS PRN
Status: ACTIVE | OUTPATIENT
Start: 2024-01-01 | End: 2024-01-01

## 2024-01-01 RX ORDER — LISINOPRIL 5 MG/1
10 TABLET ORAL
Status: DISCONTINUED | OUTPATIENT
Start: 2024-01-01 | End: 2024-01-01

## 2024-01-01 RX ORDER — LACTULOSE 20 G/30ML
30 SOLUTION ORAL DAILY
Status: DISCONTINUED | OUTPATIENT
Start: 2024-01-01 | End: 2024-01-01

## 2024-01-01 RX ORDER — HEPARIN SODIUM 1000 [USP'U]/ML
1800 INJECTION, SOLUTION INTRAVENOUS; SUBCUTANEOUS
Status: DISCONTINUED | OUTPATIENT
Start: 2024-01-01 | End: 2024-01-01

## 2024-01-01 RX ORDER — PROCHLORPERAZINE EDISYLATE 5 MG/ML
5-10 INJECTION INTRAMUSCULAR; INTRAVENOUS EVERY 4 HOURS PRN
Status: DISCONTINUED | OUTPATIENT
Start: 2024-01-01 | End: 2024-01-01

## 2024-01-01 RX ORDER — ALBUMIN (HUMAN) 12.5 G/50ML
100 SOLUTION INTRAVENOUS ONCE
Status: COMPLETED | OUTPATIENT
Start: 2024-01-01 | End: 2024-01-01

## 2024-01-01 RX ORDER — MIDODRINE HYDROCHLORIDE 5 MG/1
5 TABLET ORAL
Status: DISCONTINUED | OUTPATIENT
Start: 2024-01-01 | End: 2024-01-01

## 2024-01-01 RX ORDER — GLYCOPYRROLATE 1 MG/1
1 TABLET ORAL 3 TIMES DAILY PRN
Status: DISCONTINUED | OUTPATIENT
Start: 2024-01-01 | End: 2024-01-01 | Stop reason: HOSPADM

## 2024-01-01 RX ORDER — OXYCODONE HCL 5 MG/5 ML
5 SOLUTION, ORAL ORAL
Status: DISCONTINUED | OUTPATIENT
Start: 2024-01-01 | End: 2024-01-01 | Stop reason: HOSPADM

## 2024-01-01 RX ORDER — AMOXICILLIN AND CLAVULANATE POTASSIUM 875; 125 MG/1; MG/1
1 TABLET, FILM COATED ORAL EVERY 12 HOURS
Status: DISCONTINUED | OUTPATIENT
Start: 2024-01-01 | End: 2024-01-01 | Stop reason: HOSPADM

## 2024-01-01 RX ORDER — PROMETHAZINE HYDROCHLORIDE 25 MG/1
12.5-25 TABLET ORAL EVERY 4 HOURS PRN
Status: DISCONTINUED | OUTPATIENT
Start: 2024-01-01 | End: 2024-01-01 | Stop reason: HOSPADM

## 2024-01-01 RX ORDER — FUROSEMIDE 40 MG/1
40 TABLET ORAL
Status: DISCONTINUED | OUTPATIENT
Start: 2024-01-01 | End: 2024-01-01 | Stop reason: HOSPADM

## 2024-01-01 RX ORDER — OXYCODONE HYDROCHLORIDE 5 MG/1
5 TABLET ORAL EVERY 12 HOURS PRN
Status: DISCONTINUED | OUTPATIENT
Start: 2024-01-01 | End: 2024-01-01 | Stop reason: HOSPADM

## 2024-01-01 RX ORDER — EPHEDRINE SULFATE 50 MG/ML
INJECTION, SOLUTION INTRAVENOUS PRN
Status: DISCONTINUED | OUTPATIENT
Start: 2024-01-01 | End: 2024-01-01 | Stop reason: HOSPADM

## 2024-01-01 RX ORDER — OMEPRAZOLE 20 MG/1
20 CAPSULE, DELAYED RELEASE ORAL DAILY
Status: DISCONTINUED | OUTPATIENT
Start: 2024-01-01 | End: 2024-01-01 | Stop reason: HOSPADM

## 2024-01-01 RX ORDER — LORAZEPAM 2 MG/ML
2 CONCENTRATE ORAL
Status: DISCONTINUED | OUTPATIENT
Start: 2024-01-01 | End: 2024-01-01 | Stop reason: HOSPADM

## 2024-01-01 RX ORDER — ALBUMIN (HUMAN) 12.5 G/50ML
150 SOLUTION INTRAVENOUS ONCE
Status: COMPLETED | OUTPATIENT
Start: 2024-01-01 | End: 2024-01-01

## 2024-01-01 RX ORDER — OMEPRAZOLE 20 MG/1
40 CAPSULE, DELAYED RELEASE ORAL DAILY
Status: DISCONTINUED | OUTPATIENT
Start: 2024-01-01 | End: 2024-01-01

## 2024-01-01 RX ORDER — METHYLPREDNISOLONE SODIUM SUCCINATE 40 MG/ML
40 INJECTION, POWDER, LYOPHILIZED, FOR SOLUTION INTRAMUSCULAR; INTRAVENOUS ONCE
Status: COMPLETED | OUTPATIENT
Start: 2024-01-01 | End: 2024-01-01

## 2024-01-01 RX ORDER — HYDROMORPHONE HYDROCHLORIDE 2 MG/ML
2 INJECTION, SOLUTION INTRAMUSCULAR; INTRAVENOUS; SUBCUTANEOUS
Status: DISCONTINUED | OUTPATIENT
Start: 2024-01-01 | End: 2024-01-01 | Stop reason: HOSPADM

## 2024-01-01 RX ORDER — SCOLOPAMINE TRANSDERMAL SYSTEM 1 MG/1
1 PATCH, EXTENDED RELEASE TRANSDERMAL
Status: DISCONTINUED | OUTPATIENT
Start: 2024-01-01 | End: 2024-01-01 | Stop reason: HOSPADM

## 2024-01-01 RX ORDER — FUROSEMIDE 20 MG/1
20 TABLET ORAL ONCE
Status: COMPLETED | OUTPATIENT
Start: 2024-01-01 | End: 2024-01-01

## 2024-01-01 RX ORDER — ONDANSETRON 2 MG/ML
4 INJECTION INTRAMUSCULAR; INTRAVENOUS EVERY 4 HOURS PRN
Status: DISCONTINUED | OUTPATIENT
Start: 2024-01-01 | End: 2024-01-01 | Stop reason: HOSPADM

## 2024-01-01 RX ORDER — BISACODYL 10 MG
10 SUPPOSITORY, RECTAL RECTAL
Status: DISCONTINUED | OUTPATIENT
Start: 2024-01-01 | End: 2024-01-01 | Stop reason: HOSPADM

## 2024-01-01 RX ORDER — DEXTROSE MONOHYDRATE 25 G/50ML
50 INJECTION, SOLUTION INTRAVENOUS ONCE
Status: COMPLETED | OUTPATIENT
Start: 2024-01-01 | End: 2024-01-01

## 2024-01-01 RX ORDER — DIPHENHYDRAMINE HYDROCHLORIDE 50 MG/ML
12.5 INJECTION INTRAMUSCULAR; INTRAVENOUS
Status: DISCONTINUED | OUTPATIENT
Start: 2024-01-01 | End: 2024-01-01 | Stop reason: HOSPADM

## 2024-01-01 RX ORDER — CIPROFLOXACIN 500 MG/1
500 TABLET, FILM COATED ORAL DAILY
Status: ON HOLD | COMMUNITY
End: 2024-01-01

## 2024-01-01 RX ORDER — MAGNESIUM SULFATE HEPTAHYDRATE 40 MG/ML
2 INJECTION, SOLUTION INTRAVENOUS ONCE
Status: COMPLETED | OUTPATIENT
Start: 2024-01-01 | End: 2024-01-01

## 2024-01-01 RX ORDER — HYDROMORPHONE HYDROCHLORIDE 1 MG/ML
0.2 INJECTION, SOLUTION INTRAMUSCULAR; INTRAVENOUS; SUBCUTANEOUS
Status: DISCONTINUED | OUTPATIENT
Start: 2024-01-01 | End: 2024-01-01

## 2024-01-01 RX ORDER — SODIUM CHLORIDE, SODIUM LACTATE, POTASSIUM CHLORIDE, AND CALCIUM CHLORIDE .6; .31; .03; .02 G/100ML; G/100ML; G/100ML; G/100ML
1000 INJECTION, SOLUTION INTRAVENOUS ONCE
Status: COMPLETED | OUTPATIENT
Start: 2024-01-01 | End: 2024-01-01

## 2024-01-01 RX ORDER — GABAPENTIN 300 MG/1
300 CAPSULE ORAL 2 TIMES DAILY
Status: DISCONTINUED | OUTPATIENT
Start: 2024-01-01 | End: 2024-01-01

## 2024-01-01 RX ORDER — GABAPENTIN 300 MG/1
300 CAPSULE ORAL 2 TIMES DAILY
Status: DISCONTINUED | OUTPATIENT
Start: 2024-01-01 | End: 2024-01-01 | Stop reason: HOSPADM

## 2024-01-01 RX ORDER — NOREPINEPHRINE BITARTRATE 0.03 MG/ML
0-1 INJECTION, SOLUTION INTRAVENOUS CONTINUOUS
Status: DISCONTINUED | OUTPATIENT
Start: 2024-01-01 | End: 2024-01-01

## 2024-01-01 RX ORDER — CALCIUM GLUCONATE 20 MG/ML
1 INJECTION, SOLUTION INTRAVENOUS ONCE
Status: COMPLETED | OUTPATIENT
Start: 2024-01-01 | End: 2024-01-01

## 2024-01-01 RX ORDER — OMEPRAZOLE 40 MG/1
40 CAPSULE, DELAYED RELEASE ORAL DAILY
COMMUNITY

## 2024-01-01 RX ORDER — ONDANSETRON 2 MG/ML
4 INJECTION INTRAMUSCULAR; INTRAVENOUS PRN
Status: ACTIVE | OUTPATIENT
Start: 2024-01-01 | End: 2024-01-01

## 2024-01-01 RX ORDER — CARBOXYMETHYLCELLULOSE SODIUM 5 MG/ML
1 SOLUTION/ DROPS OPHTHALMIC PRN
Status: DISCONTINUED | OUTPATIENT
Start: 2024-01-01 | End: 2024-01-01 | Stop reason: HOSPADM

## 2024-01-01 RX ORDER — ALBUMIN (HUMAN) 12.5 G/50ML
25 SOLUTION INTRAVENOUS 3 TIMES DAILY
Status: DISCONTINUED | OUTPATIENT
Start: 2024-01-01 | End: 2024-01-01

## 2024-01-01 RX ORDER — HYDROMORPHONE HYDROCHLORIDE 1 MG/ML
1 INJECTION, SOLUTION INTRAMUSCULAR; INTRAVENOUS; SUBCUTANEOUS
Status: DISCONTINUED | OUTPATIENT
Start: 2024-01-01 | End: 2024-01-01

## 2024-01-01 RX ORDER — ONDANSETRON 2 MG/ML
8 INJECTION INTRAMUSCULAR; INTRAVENOUS EVERY 8 HOURS PRN
Status: DISCONTINUED | OUTPATIENT
Start: 2024-01-01 | End: 2024-01-01 | Stop reason: HOSPADM

## 2024-01-01 RX ORDER — LIDOCAINE HYDROCHLORIDE 10 MG/ML
INJECTION, SOLUTION INFILTRATION; PERINEURAL
Status: COMPLETED
Start: 2024-01-01 | End: 2024-01-01

## 2024-01-01 RX ORDER — PROMETHAZINE HYDROCHLORIDE 25 MG/1
12.5-25 SUPPOSITORY RECTAL EVERY 4 HOURS PRN
Status: DISCONTINUED | OUTPATIENT
Start: 2024-01-01 | End: 2024-01-01 | Stop reason: HOSPADM

## 2024-01-01 RX ORDER — LACTULOSE 20 G/30ML
30 SOLUTION ORAL 2 TIMES DAILY
Status: DISCONTINUED | OUTPATIENT
Start: 2024-01-01 | End: 2024-01-01

## 2024-01-01 RX ORDER — ONDANSETRON 4 MG/1
4 TABLET, ORALLY DISINTEGRATING ORAL EVERY 4 HOURS PRN
Status: DISCONTINUED | OUTPATIENT
Start: 2024-01-01 | End: 2024-01-01

## 2024-01-01 RX ORDER — METRONIDAZOLE 500 MG/100ML
500 INJECTION, SOLUTION INTRAVENOUS EVERY 12 HOURS
Status: DISCONTINUED | OUTPATIENT
Start: 2024-01-01 | End: 2024-01-01

## 2024-01-01 RX ORDER — CALCIUM ACETATE 667 MG/1
1334 TABLET ORAL
Status: DISCONTINUED | OUTPATIENT
Start: 2024-01-01 | End: 2024-01-01

## 2024-01-01 RX ORDER — SODIUM CHLORIDE, SODIUM LACTATE, POTASSIUM CHLORIDE, CALCIUM CHLORIDE 600; 310; 30; 20 MG/100ML; MG/100ML; MG/100ML; MG/100ML
INJECTION, SOLUTION INTRAVENOUS
Status: DISCONTINUED | OUTPATIENT
Start: 2024-01-01 | End: 2024-01-01 | Stop reason: SURG

## 2024-01-01 RX ORDER — MAGNESIUM SULFATE 1 G/100ML
1 INJECTION INTRAVENOUS ONCE
Status: COMPLETED | OUTPATIENT
Start: 2024-01-01 | End: 2024-01-01

## 2024-01-01 RX ORDER — ALBUMIN (HUMAN) 12.5 G/50ML
50 SOLUTION INTRAVENOUS PRN
Status: DISCONTINUED | OUTPATIENT
Start: 2024-01-01 | End: 2024-01-01

## 2024-01-01 RX ORDER — FUROSEMIDE 10 MG/ML
100 INJECTION INTRAMUSCULAR; INTRAVENOUS ONCE
Status: COMPLETED | OUTPATIENT
Start: 2024-01-01 | End: 2024-01-01

## 2024-01-01 RX ORDER — MIDAZOLAM HYDROCHLORIDE 1 MG/ML
INJECTION INTRAMUSCULAR; INTRAVENOUS PRN
Status: DISCONTINUED | OUTPATIENT
Start: 2024-01-01 | End: 2024-01-01 | Stop reason: SURG

## 2024-01-01 RX ORDER — AMOXICILLIN AND CLAVULANATE POTASSIUM 875; 125 MG/1; MG/1
1 TABLET, FILM COATED ORAL EVERY 12 HOURS
Qty: 8 TABLET | Refills: 0 | Status: ACTIVE | OUTPATIENT
Start: 2024-01-01 | End: 2024-01-01

## 2024-01-01 RX ORDER — HEPARIN SODIUM 5000 [USP'U]/ML
5000 INJECTION, SOLUTION INTRAVENOUS; SUBCUTANEOUS EVERY 8 HOURS
Status: DISCONTINUED | OUTPATIENT
Start: 2024-01-01 | End: 2024-01-01

## 2024-01-01 RX ORDER — PROMETHAZINE HYDROCHLORIDE 12.5 MG/1
12.5-25 SUPPOSITORY RECTAL EVERY 4 HOURS PRN
Status: DISCONTINUED | OUTPATIENT
Start: 2024-01-01 | End: 2024-01-01

## 2024-01-01 RX ORDER — MIDODRINE HYDROCHLORIDE 5 MG/1
10 TABLET ORAL
Status: DISCONTINUED | OUTPATIENT
Start: 2024-01-01 | End: 2024-01-01

## 2024-01-01 RX ORDER — HALOPERIDOL 5 MG/ML
1 INJECTION INTRAMUSCULAR
Status: DISCONTINUED | OUTPATIENT
Start: 2024-01-01 | End: 2024-01-01 | Stop reason: HOSPADM

## 2024-01-01 RX ORDER — ONDANSETRON 2 MG/ML
4 INJECTION INTRAMUSCULAR; INTRAVENOUS EVERY 4 HOURS PRN
Status: ON HOLD | COMMUNITY
End: 2024-01-01

## 2024-01-01 RX ORDER — AMOXICILLIN 250 MG
2 CAPSULE ORAL 2 TIMES DAILY
Status: DISCONTINUED | OUTPATIENT
Start: 2024-01-01 | End: 2024-01-01 | Stop reason: HOSPADM

## 2024-01-01 RX ORDER — POLYETHYLENE GLYCOL 3350 17 G/17G
17 POWDER, FOR SOLUTION ORAL
Qty: 10 PACKET | Refills: 0 | Status: SHIPPED | OUTPATIENT
Start: 2024-01-01

## 2024-01-01 RX ORDER — MORPHINE SULFATE 100 MG/5ML
20 SOLUTION ORAL
Status: DISCONTINUED | OUTPATIENT
Start: 2024-01-01 | End: 2024-01-01 | Stop reason: HOSPADM

## 2024-01-01 RX ORDER — SODIUM BICARBONATE 650 MG/1
1300 TABLET ORAL 2 TIMES DAILY
Status: DISCONTINUED | OUTPATIENT
Start: 2024-01-01 | End: 2024-01-01

## 2024-01-01 RX ORDER — LIDOCAINE HYDROCHLORIDE 20 MG/ML
INJECTION, SOLUTION EPIDURAL; INFILTRATION; INTRACAUDAL; PERINEURAL PRN
Status: DISCONTINUED | OUTPATIENT
Start: 2024-01-01 | End: 2024-01-01 | Stop reason: SURG

## 2024-01-01 RX ORDER — ACETAMINOPHEN 325 MG/1
650 TABLET ORAL EVERY 6 HOURS PRN
Status: DISCONTINUED | OUTPATIENT
Start: 2024-01-01 | End: 2024-01-01 | Stop reason: HOSPADM

## 2024-01-01 RX ORDER — ONDANSETRON 4 MG/1
4 TABLET, ORALLY DISINTEGRATING ORAL EVERY 4 HOURS PRN
Status: DISCONTINUED | OUTPATIENT
Start: 2024-01-01 | End: 2024-01-01 | Stop reason: HOSPADM

## 2024-01-01 RX ORDER — OXYCODONE HYDROCHLORIDE 5 MG/1
5 TABLET ORAL ONCE
Status: COMPLETED | OUTPATIENT
Start: 2024-01-01 | End: 2024-01-01

## 2024-01-01 RX ORDER — IBUPROFEN 200 MG
200 TABLET ORAL EVERY 6 HOURS PRN
Status: ON HOLD | COMMUNITY
End: 2024-01-01

## 2024-01-01 RX ORDER — CEFAZOLIN SODIUM 1 G/3ML
INJECTION, POWDER, FOR SOLUTION INTRAMUSCULAR; INTRAVENOUS PRN
Status: DISCONTINUED | OUTPATIENT
Start: 2024-01-01 | End: 2024-01-01 | Stop reason: SURG

## 2024-01-01 RX ORDER — OXYCODONE HCL 5 MG/5 ML
10 SOLUTION, ORAL ORAL
Status: DISCONTINUED | OUTPATIENT
Start: 2024-01-01 | End: 2024-01-01 | Stop reason: HOSPADM

## 2024-01-01 RX ORDER — ONDANSETRON 4 MG/1
4 TABLET, ORALLY DISINTEGRATING ORAL EVERY 6 HOURS PRN
COMMUNITY

## 2024-01-01 RX ORDER — SODIUM BICARBONATE 650 MG/1
650 TABLET ORAL EVERY 8 HOURS
Status: DISCONTINUED | OUTPATIENT
Start: 2024-01-01 | End: 2024-01-01

## 2024-01-01 RX ORDER — FUROSEMIDE 10 MG/ML
40 INJECTION INTRAMUSCULAR; INTRAVENOUS ONCE
Status: COMPLETED | OUTPATIENT
Start: 2024-01-01 | End: 2024-01-01

## 2024-01-01 RX ORDER — ONDANSETRON 2 MG/ML
4 INJECTION INTRAMUSCULAR; INTRAVENOUS
Status: DISCONTINUED | OUTPATIENT
Start: 2024-01-01 | End: 2024-01-01 | Stop reason: HOSPADM

## 2024-01-01 RX ADMIN — TRAZODONE HYDROCHLORIDE 50 MG: 50 TABLET ORAL at 21:34

## 2024-01-01 RX ADMIN — HEPARIN SODIUM 5000 UNITS: 5000 INJECTION, SOLUTION INTRAVENOUS; SUBCUTANEOUS at 05:51

## 2024-01-01 RX ADMIN — HYDROMORPHONE HYDROCHLORIDE 0.5 MG: 1 INJECTION, SOLUTION INTRAMUSCULAR; INTRAVENOUS; SUBCUTANEOUS at 21:27

## 2024-01-01 RX ADMIN — HYDROMORPHONE HYDROCHLORIDE 1 MG: 1 INJECTION, SOLUTION INTRAMUSCULAR; INTRAVENOUS; SUBCUTANEOUS at 21:08

## 2024-01-01 RX ADMIN — HYDROMORPHONE HYDROCHLORIDE 1 MG: 1 INJECTION, SOLUTION INTRAMUSCULAR; INTRAVENOUS; SUBCUTANEOUS at 22:35

## 2024-01-01 RX ADMIN — OXYCODONE HYDROCHLORIDE 10 MG: 10 TABLET ORAL at 07:35

## 2024-01-01 RX ADMIN — RIFAXIMIN 550 MG: 550 TABLET ORAL at 05:43

## 2024-01-01 RX ADMIN — SODIUM BICARBONATE 150 MEQ: 84 INJECTION, SOLUTION INTRAVENOUS at 06:25

## 2024-01-01 RX ADMIN — OMEPRAZOLE 40 MG: 20 CAPSULE, DELAYED RELEASE ORAL at 05:51

## 2024-01-01 RX ADMIN — CALCIUM GLUCONATE 1 G: 20 INJECTION, SOLUTION INTRAVENOUS at 01:50

## 2024-01-01 RX ADMIN — FUROSEMIDE 100 MG: 10 INJECTION INTRAMUSCULAR; INTRAVENOUS at 07:31

## 2024-01-01 RX ADMIN — TRAZODONE HYDROCHLORIDE 50 MG: 50 TABLET ORAL at 20:50

## 2024-01-01 RX ADMIN — MIDODRINE HYDROCHLORIDE 15 MG: 5 TABLET ORAL at 18:16

## 2024-01-01 RX ADMIN — OXYCODONE HYDROCHLORIDE 10 MG: 10 TABLET ORAL at 16:49

## 2024-01-01 RX ADMIN — RIFAXIMIN 550 MG: 550 TABLET ORAL at 05:54

## 2024-01-01 RX ADMIN — MIDODRINE HYDROCHLORIDE 15 MG: 5 TABLET ORAL at 12:02

## 2024-01-01 RX ADMIN — SENNOSIDES AND DOCUSATE SODIUM 2 TABLET: 50; 8.6 TABLET ORAL at 05:24

## 2024-01-01 RX ADMIN — CEFTRIAXONE SODIUM 2000 MG: 10 INJECTION, POWDER, FOR SOLUTION INTRAVENOUS at 05:55

## 2024-01-01 RX ADMIN — HEPARIN SODIUM 1800 UNITS: 1000 INJECTION, SOLUTION INTRAVENOUS; SUBCUTANEOUS at 12:42

## 2024-01-01 RX ADMIN — Medication 1334 MG: at 16:41

## 2024-01-01 RX ADMIN — LACTULOSE 30 ML: 20 SOLUTION ORAL at 17:29

## 2024-01-01 RX ADMIN — METRONIDAZOLE 500 MG: 500 INJECTION, SOLUTION INTRAVENOUS at 06:31

## 2024-01-01 RX ADMIN — OMEPRAZOLE 20 MG: 20 CAPSULE, DELAYED RELEASE ORAL at 05:24

## 2024-01-01 RX ADMIN — VASOPRESSIN 0.03 UNITS/MIN: 20 INJECTION INTRAVENOUS at 21:19

## 2024-01-01 RX ADMIN — HEPARIN SODIUM 1800 UNITS: 1000 INJECTION, SOLUTION INTRAVENOUS; SUBCUTANEOUS at 13:45

## 2024-01-01 RX ADMIN — OXYCODONE HYDROCHLORIDE 10 MG: 10 TABLET ORAL at 20:37

## 2024-01-01 RX ADMIN — PROPOFOL 100 MG: 10 INJECTION, EMULSION INTRAVENOUS at 11:20

## 2024-01-01 RX ADMIN — OMEPRAZOLE 40 MG: 20 CAPSULE, DELAYED RELEASE ORAL at 05:32

## 2024-01-01 RX ADMIN — FUROSEMIDE 40 MG: 40 TABLET ORAL at 06:16

## 2024-01-01 RX ADMIN — Medication 1334 MG: at 17:31

## 2024-01-01 RX ADMIN — FUROSEMIDE 20 MG: 20 TABLET ORAL at 11:49

## 2024-01-01 RX ADMIN — NOREPINEPHRINE BITARTRATE 0.11 MCG/KG/MIN: 1 INJECTION, SOLUTION, CONCENTRATE INTRAVENOUS at 06:22

## 2024-01-01 RX ADMIN — HYDROMORPHONE HYDROCHLORIDE 1 MG: 1 INJECTION, SOLUTION INTRAMUSCULAR; INTRAVENOUS; SUBCUTANEOUS at 20:50

## 2024-01-01 RX ADMIN — GABAPENTIN 300 MG: 300 CAPSULE ORAL at 17:27

## 2024-01-01 RX ADMIN — HYDROMORPHONE HYDROCHLORIDE 0.5 MG: 1 INJECTION, SOLUTION INTRAMUSCULAR; INTRAVENOUS; SUBCUTANEOUS at 11:26

## 2024-01-01 RX ADMIN — HYDROMORPHONE HYDROCHLORIDE 1 MG: 1 INJECTION, SOLUTION INTRAMUSCULAR; INTRAVENOUS; SUBCUTANEOUS at 16:12

## 2024-01-01 RX ADMIN — CEFTRIAXONE SODIUM 2000 MG: 10 INJECTION, POWDER, FOR SOLUTION INTRAVENOUS at 05:44

## 2024-01-01 RX ADMIN — Medication 667 MG: at 17:29

## 2024-01-01 RX ADMIN — OXYCODONE HYDROCHLORIDE 10 MG: 10 TABLET ORAL at 15:04

## 2024-01-01 RX ADMIN — Medication 1334 MG: at 12:02

## 2024-01-01 RX ADMIN — OXYCODONE HYDROCHLORIDE 10 MG: 10 TABLET ORAL at 20:03

## 2024-01-01 RX ADMIN — ALBUMIN (HUMAN) 25 G: 0.25 INJECTION, SOLUTION INTRAVENOUS at 12:06

## 2024-01-01 RX ADMIN — OXYCODONE HYDROCHLORIDE 5 MG: 5 TABLET ORAL at 00:00

## 2024-01-01 RX ADMIN — MIDODRINE HYDROCHLORIDE 15 MG: 5 TABLET ORAL at 18:42

## 2024-01-01 RX ADMIN — HYDROMORPHONE HYDROCHLORIDE 1 MG: 1 INJECTION, SOLUTION INTRAMUSCULAR; INTRAVENOUS; SUBCUTANEOUS at 14:32

## 2024-01-01 RX ADMIN — FUROSEMIDE 10 MG/HR: 10 INJECTION, SOLUTION INTRAVENOUS at 10:26

## 2024-01-01 RX ADMIN — ALBUMIN (HUMAN) 50 G: 0.25 INJECTION, SOLUTION INTRAVENOUS at 12:32

## 2024-01-01 RX ADMIN — OXYCODONE HYDROCHLORIDE 10 MG: 10 TABLET ORAL at 06:15

## 2024-01-01 RX ADMIN — VASOPRESSIN 0.03 UNITS/MIN: 20 INJECTION INTRAVENOUS at 08:39

## 2024-01-01 RX ADMIN — HEPARIN SODIUM 5000 UNITS: 5000 INJECTION, SOLUTION INTRAVENOUS; SUBCUTANEOUS at 15:31

## 2024-01-01 RX ADMIN — FUROSEMIDE 15 MG/HR: 10 INJECTION, SOLUTION INTRAVENOUS at 20:56

## 2024-01-01 RX ADMIN — Medication 1334 MG: at 11:23

## 2024-01-01 RX ADMIN — MIDODRINE HYDROCHLORIDE 15 MG: 5 TABLET ORAL at 13:03

## 2024-01-01 RX ADMIN — OMEPRAZOLE 20 MG: 20 CAPSULE, DELAYED RELEASE ORAL at 05:56

## 2024-01-01 RX ADMIN — RIFAXIMIN 550 MG: 550 TABLET ORAL at 16:41

## 2024-01-01 RX ADMIN — Medication 667 MG: at 11:35

## 2024-01-01 RX ADMIN — NOREPINEPHRINE BITARTRATE 0.18 MCG/KG/MIN: 1 INJECTION, SOLUTION, CONCENTRATE INTRAVENOUS at 01:40

## 2024-01-01 RX ADMIN — MIDODRINE HYDROCHLORIDE 15 MG: 5 TABLET ORAL at 11:01

## 2024-01-01 RX ADMIN — Medication 667 MG: at 08:12

## 2024-01-01 RX ADMIN — RIFAXIMIN 550 MG: 550 TABLET ORAL at 06:08

## 2024-01-01 RX ADMIN — Medication 1334 MG: at 18:16

## 2024-01-01 RX ADMIN — TRAZODONE HYDROCHLORIDE 50 MG: 50 TABLET ORAL at 21:08

## 2024-01-01 RX ADMIN — OXYCODONE HYDROCHLORIDE 10 MG: 10 TABLET ORAL at 17:30

## 2024-01-01 RX ADMIN — SENNOSIDES AND DOCUSATE SODIUM 2 TABLET: 50; 8.6 TABLET ORAL at 17:26

## 2024-01-01 RX ADMIN — RIFAXIMIN 550 MG: 550 TABLET ORAL at 05:45

## 2024-01-01 RX ADMIN — MIDODRINE HYDROCHLORIDE 15 MG: 5 TABLET ORAL at 08:02

## 2024-01-01 RX ADMIN — HYDROMORPHONE HYDROCHLORIDE 1 MG: 1 INJECTION, SOLUTION INTRAMUSCULAR; INTRAVENOUS; SUBCUTANEOUS at 15:01

## 2024-01-01 RX ADMIN — SENNOSIDES AND DOCUSATE SODIUM 2 TABLET: 50; 8.6 TABLET ORAL at 06:08

## 2024-01-01 RX ADMIN — NOREPINEPHRINE BITARTRATE 0.05 MCG/KG/MIN: 1 INJECTION, SOLUTION, CONCENTRATE INTRAVENOUS at 01:19

## 2024-01-01 RX ADMIN — CEFTRIAXONE SODIUM 2000 MG: 2 INJECTION, POWDER, FOR SOLUTION INTRAMUSCULAR; INTRAVENOUS at 05:28

## 2024-01-01 RX ADMIN — OXYCODONE HYDROCHLORIDE 10 MG: 10 TABLET ORAL at 08:02

## 2024-01-01 RX ADMIN — HYDROMORPHONE HYDROCHLORIDE 1 MG: 1 INJECTION, SOLUTION INTRAMUSCULAR; INTRAVENOUS; SUBCUTANEOUS at 05:21

## 2024-01-01 RX ADMIN — MIDAZOLAM HYDROCHLORIDE 2 MG: 1 INJECTION, SOLUTION INTRAMUSCULAR; INTRAVENOUS at 11:17

## 2024-01-01 RX ADMIN — ONDANSETRON 4 MG: 2 INJECTION INTRAMUSCULAR; INTRAVENOUS at 19:22

## 2024-01-01 RX ADMIN — OXYCODONE HYDROCHLORIDE 10 MG: 10 TABLET ORAL at 21:13

## 2024-01-01 RX ADMIN — MIDODRINE HYDROCHLORIDE 15 MG: 5 TABLET ORAL at 16:41

## 2024-01-01 RX ADMIN — MIDODRINE HYDROCHLORIDE 15 MG: 5 TABLET ORAL at 08:41

## 2024-01-01 RX ADMIN — AMOXICILLIN AND CLAVULANATE POTASSIUM 1 TABLET: 875; 125 TABLET, FILM COATED ORAL at 06:14

## 2024-01-01 RX ADMIN — OXYCODONE HYDROCHLORIDE 10 MG: 10 TABLET ORAL at 10:04

## 2024-01-01 RX ADMIN — RIFAXIMIN 550 MG: 550 TABLET ORAL at 05:55

## 2024-01-01 RX ADMIN — CEFTRIAXONE SODIUM 2000 MG: 10 INJECTION, POWDER, FOR SOLUTION INTRAVENOUS at 05:46

## 2024-01-01 RX ADMIN — SENNOSIDES AND DOCUSATE SODIUM 2 TABLET: 50; 8.6 TABLET ORAL at 06:11

## 2024-01-01 RX ADMIN — LACTULOSE 30 ML: 20 SOLUTION ORAL at 11:35

## 2024-01-01 RX ADMIN — LORAZEPAM 1 MG: 2 LIQUID ORAL at 14:20

## 2024-01-01 RX ADMIN — Medication 1334 MG: at 16:32

## 2024-01-01 RX ADMIN — LISINOPRIL 10 MG: 5 TABLET ORAL at 22:32

## 2024-01-01 RX ADMIN — HEPARIN SODIUM 5000 UNITS: 5000 INJECTION, SOLUTION INTRAVENOUS; SUBCUTANEOUS at 13:49

## 2024-01-01 RX ADMIN — OMEPRAZOLE 40 MG: 20 CAPSULE, DELAYED RELEASE ORAL at 05:43

## 2024-01-01 RX ADMIN — ONDANSETRON 4 MG: 2 INJECTION INTRAMUSCULAR; INTRAVENOUS at 15:39

## 2024-01-01 RX ADMIN — SODIUM BICARBONATE 650 MG: 650 TABLET ORAL at 08:37

## 2024-01-01 RX ADMIN — RIFAXIMIN 550 MG: 550 TABLET ORAL at 05:32

## 2024-01-01 RX ADMIN — HEPARIN SODIUM 5000 UNITS: 5000 INJECTION, SOLUTION INTRAVENOUS; SUBCUTANEOUS at 22:33

## 2024-01-01 RX ADMIN — PROCHLORPERAZINE EDISYLATE 10 MG: 5 INJECTION INTRAMUSCULAR; INTRAVENOUS at 22:13

## 2024-01-01 RX ADMIN — HEPARIN SODIUM 5000 UNITS: 5000 INJECTION, SOLUTION INTRAVENOUS; SUBCUTANEOUS at 22:06

## 2024-01-01 RX ADMIN — GABAPENTIN 300 MG: 300 CAPSULE ORAL at 06:16

## 2024-01-01 RX ADMIN — MIDODRINE HYDROCHLORIDE 15 MG: 5 TABLET ORAL at 12:22

## 2024-01-01 RX ADMIN — Medication 667 MG: at 18:06

## 2024-01-01 RX ADMIN — MORPHINE SULFATE 5 MG: 10 INJECTION INTRAVENOUS at 15:07

## 2024-01-01 RX ADMIN — OXYCODONE HYDROCHLORIDE 5 MG: 5 TABLET ORAL at 00:01

## 2024-01-01 RX ADMIN — PROCHLORPERAZINE EDISYLATE 10 MG: 5 INJECTION INTRAMUSCULAR; INTRAVENOUS at 09:50

## 2024-01-01 RX ADMIN — OXYCODONE HYDROCHLORIDE 10 MG: 10 TABLET ORAL at 02:52

## 2024-01-01 RX ADMIN — METRONIDAZOLE 500 MG: 500 INJECTION, SOLUTION INTRAVENOUS at 05:55

## 2024-01-01 RX ADMIN — HEPARIN SODIUM 1800 UNITS: 1000 INJECTION, SOLUTION INTRAVENOUS; SUBCUTANEOUS at 12:00

## 2024-01-01 RX ADMIN — HYDROMORPHONE HYDROCHLORIDE 0.5 MG: 1 INJECTION, SOLUTION INTRAMUSCULAR; INTRAVENOUS; SUBCUTANEOUS at 09:09

## 2024-01-01 RX ADMIN — SODIUM BICARBONATE 150 MEQ: 84 INJECTION, SOLUTION INTRAVENOUS at 11:54

## 2024-01-01 RX ADMIN — BENZOCAINE AND MENTHOL, UNSPECIFIED FORM 1 LOZENGE: 15; 3.6 LOZENGE ORAL at 09:54

## 2024-01-01 RX ADMIN — HYDROMORPHONE HYDROCHLORIDE 1 MG: 1 INJECTION, SOLUTION INTRAMUSCULAR; INTRAVENOUS; SUBCUTANEOUS at 07:17

## 2024-01-01 RX ADMIN — HEPARIN SODIUM 5000 UNITS: 5000 INJECTION, SOLUTION INTRAVENOUS; SUBCUTANEOUS at 13:42

## 2024-01-01 RX ADMIN — NOREPINEPHRINE BITARTRATE 0.25 MCG/KG/MIN: 1 INJECTION, SOLUTION, CONCENTRATE INTRAVENOUS at 09:10

## 2024-01-01 RX ADMIN — SODIUM CHLORIDE 1000 ML: 9 INJECTION, SOLUTION INTRAVENOUS at 04:58

## 2024-01-01 RX ADMIN — SODIUM BICARBONATE 650 MG: 650 TABLET ORAL at 02:36

## 2024-01-01 RX ADMIN — PROCHLORPERAZINE EDISYLATE 10 MG: 5 INJECTION INTRAMUSCULAR; INTRAVENOUS at 11:26

## 2024-01-01 RX ADMIN — HYDROMORPHONE HYDROCHLORIDE 0.5 MG: 1 INJECTION, SOLUTION INTRAMUSCULAR; INTRAVENOUS; SUBCUTANEOUS at 22:49

## 2024-01-01 RX ADMIN — LACTULOSE 30 ML: 20 SOLUTION ORAL at 05:36

## 2024-01-01 RX ADMIN — OXYCODONE HYDROCHLORIDE 5 MG: 5 TABLET ORAL at 15:21

## 2024-01-01 RX ADMIN — OXYCODONE HYDROCHLORIDE 5 MG: 5 TABLET ORAL at 05:32

## 2024-01-01 RX ADMIN — OMEPRAZOLE 20 MG: 20 CAPSULE, DELAYED RELEASE ORAL at 06:11

## 2024-01-01 RX ADMIN — HEPARIN SODIUM 5000 UNITS: 5000 INJECTION, SOLUTION INTRAVENOUS; SUBCUTANEOUS at 14:08

## 2024-01-01 RX ADMIN — HYDROMORPHONE HYDROCHLORIDE 1 MG: 1 INJECTION, SOLUTION INTRAMUSCULAR; INTRAVENOUS; SUBCUTANEOUS at 18:44

## 2024-01-01 RX ADMIN — OXYCODONE HYDROCHLORIDE 5 MG: 5 TABLET ORAL at 13:07

## 2024-01-01 RX ADMIN — SENNOSIDES AND DOCUSATE SODIUM 2 TABLET: 50; 8.6 TABLET ORAL at 16:49

## 2024-01-01 RX ADMIN — GABAPENTIN 300 MG: 300 CAPSULE ORAL at 22:28

## 2024-01-01 RX ADMIN — SODIUM BICARBONATE 650 MG: 650 TABLET ORAL at 20:05

## 2024-01-01 RX ADMIN — OXYCODONE HYDROCHLORIDE 10 MG: 10 TABLET ORAL at 18:06

## 2024-01-01 RX ADMIN — HEPARIN SODIUM 5000 UNITS: 5000 INJECTION, SOLUTION INTRAVENOUS; SUBCUTANEOUS at 22:31

## 2024-01-01 RX ADMIN — HYDROMORPHONE HYDROCHLORIDE 0.5 MG: 1 INJECTION, SOLUTION INTRAMUSCULAR; INTRAVENOUS; SUBCUTANEOUS at 06:44

## 2024-01-01 RX ADMIN — AMOXICILLIN AND CLAVULANATE POTASSIUM 1 TABLET: 875; 125 TABLET, FILM COATED ORAL at 05:56

## 2024-01-01 RX ADMIN — LIDOCAINE HYDROCHLORIDE 100 MG: 20 INJECTION, SOLUTION EPIDURAL; INFILTRATION; INTRACAUDAL at 11:20

## 2024-01-01 RX ADMIN — Medication 1334 MG: at 18:42

## 2024-01-01 RX ADMIN — CEFTRIAXONE SODIUM 2000 MG: 10 INJECTION, POWDER, FOR SOLUTION INTRAVENOUS at 06:21

## 2024-01-01 RX ADMIN — NOREPINEPHRINE BITARTRATE 0.2 MCG/KG/MIN: 1 INJECTION, SOLUTION, CONCENTRATE INTRAVENOUS at 16:54

## 2024-01-01 RX ADMIN — GABAPENTIN 300 MG: 300 CAPSULE ORAL at 06:11

## 2024-01-01 RX ADMIN — RIFAXIMIN 550 MG: 550 TABLET ORAL at 18:50

## 2024-01-01 RX ADMIN — MORPHINE SULFATE 20 MG: 100 SOLUTION ORAL at 12:41

## 2024-01-01 RX ADMIN — HEPARIN SODIUM 5000 UNITS: 5000 INJECTION, SOLUTION INTRAVENOUS; SUBCUTANEOUS at 13:04

## 2024-01-01 RX ADMIN — FUROSEMIDE 10 MG/HR: 10 INJECTION, SOLUTION INTRAVENOUS at 11:37

## 2024-01-01 RX ADMIN — OMEPRAZOLE 20 MG: 20 CAPSULE, DELAYED RELEASE ORAL at 06:14

## 2024-01-01 RX ADMIN — HEPARIN SODIUM 1800 UNITS: 1000 INJECTION, SOLUTION INTRAVENOUS; SUBCUTANEOUS at 14:10

## 2024-01-01 RX ADMIN — OXYCODONE HYDROCHLORIDE 5 MG: 5 TABLET ORAL at 20:43

## 2024-01-01 RX ADMIN — OXYCODONE HYDROCHLORIDE 10 MG: 10 TABLET ORAL at 14:26

## 2024-01-01 RX ADMIN — Medication 1334 MG: at 06:40

## 2024-01-01 RX ADMIN — SODIUM BICARBONATE 1300 MG: 650 TABLET ORAL at 18:06

## 2024-01-01 RX ADMIN — PROMETHAZINE HYDROCHLORIDE 25 MG: 25 TABLET ORAL at 21:37

## 2024-01-01 RX ADMIN — Medication 1334 MG: at 08:20

## 2024-01-01 RX ADMIN — GABAPENTIN 300 MG: 300 CAPSULE ORAL at 16:41

## 2024-01-01 RX ADMIN — OXYCODONE HYDROCHLORIDE 5 MG: 5 TABLET ORAL at 12:23

## 2024-01-01 RX ADMIN — SODIUM CHLORIDE: 9 INJECTION, SOLUTION INTRAVENOUS at 05:22

## 2024-01-01 RX ADMIN — SODIUM BICARBONATE 1300 MG: 650 TABLET ORAL at 05:45

## 2024-01-01 RX ADMIN — CEFTRIAXONE SODIUM 2000 MG: 10 INJECTION, POWDER, FOR SOLUTION INTRAVENOUS at 06:10

## 2024-01-01 RX ADMIN — Medication 1334 MG: at 06:10

## 2024-01-01 RX ADMIN — OXYCODONE HYDROCHLORIDE 10 MG: 10 TABLET ORAL at 03:52

## 2024-01-01 RX ADMIN — ROCURONIUM BROMIDE 50 MG: 10 INJECTION, SOLUTION INTRAVENOUS at 11:20

## 2024-01-01 RX ADMIN — Medication 1334 MG: at 17:20

## 2024-01-01 RX ADMIN — HYDROMORPHONE HYDROCHLORIDE 0.5 MG: 1 INJECTION, SOLUTION INTRAMUSCULAR; INTRAVENOUS; SUBCUTANEOUS at 09:47

## 2024-01-01 RX ADMIN — HEPARIN SODIUM 1800 UNITS: 1000 INJECTION, SOLUTION INTRAVENOUS; SUBCUTANEOUS at 11:25

## 2024-01-01 RX ADMIN — TRAZODONE HYDROCHLORIDE 50 MG: 50 TABLET ORAL at 22:28

## 2024-01-01 RX ADMIN — HYDROMORPHONE HYDROCHLORIDE 1 MG: 1 INJECTION, SOLUTION INTRAMUSCULAR; INTRAVENOUS; SUBCUTANEOUS at 21:57

## 2024-01-01 RX ADMIN — GABAPENTIN 300 MG: 300 CAPSULE ORAL at 18:03

## 2024-01-01 RX ADMIN — RIFAXIMIN 550 MG: 550 TABLET ORAL at 06:10

## 2024-01-01 RX ADMIN — HYDROMORPHONE HYDROCHLORIDE 1 MG: 1 INJECTION, SOLUTION INTRAMUSCULAR; INTRAVENOUS; SUBCUTANEOUS at 08:50

## 2024-01-01 RX ADMIN — MIDODRINE HYDROCHLORIDE 10 MG: 5 TABLET ORAL at 17:31

## 2024-01-01 RX ADMIN — HYDROMORPHONE HYDROCHLORIDE 0.2 MG: 1 INJECTION, SOLUTION INTRAMUSCULAR; INTRAVENOUS; SUBCUTANEOUS at 16:00

## 2024-01-01 RX ADMIN — ALBUMIN (HUMAN) 25 G: 0.25 INJECTION, SOLUTION INTRAVENOUS at 17:01

## 2024-01-01 RX ADMIN — OXYCODONE HYDROCHLORIDE 10 MG: 10 TABLET ORAL at 01:32

## 2024-01-01 RX ADMIN — LIDOCAINE HYDROCHLORIDE: 10 INJECTION, SOLUTION INFILTRATION; PERINEURAL at 10:45

## 2024-01-01 RX ADMIN — MIDODRINE HYDROCHLORIDE 15 MG: 5 TABLET ORAL at 06:40

## 2024-01-01 RX ADMIN — FUROSEMIDE 40 MG: 10 INJECTION INTRAMUSCULAR; INTRAVENOUS at 15:02

## 2024-01-01 RX ADMIN — AMOXICILLIN AND CLAVULANATE POTASSIUM 1 TABLET: 875; 125 TABLET, FILM COATED ORAL at 18:03

## 2024-01-01 RX ADMIN — FUROSEMIDE 5 MG/HR: 10 INJECTION, SOLUTION INTRAVENOUS at 22:31

## 2024-01-01 RX ADMIN — RIFAXIMIN 550 MG: 550 TABLET ORAL at 04:48

## 2024-01-01 RX ADMIN — HEPARIN SODIUM 5000 UNITS: 5000 INJECTION, SOLUTION INTRAVENOUS; SUBCUTANEOUS at 05:36

## 2024-01-01 RX ADMIN — ONDANSETRON 4 MG: 2 INJECTION INTRAMUSCULAR; INTRAVENOUS at 14:32

## 2024-01-01 RX ADMIN — MAGNESIUM SULFATE IN DEXTROSE 1 G: 10 INJECTION, SOLUTION INTRAVENOUS at 01:41

## 2024-01-01 RX ADMIN — Medication 1334 MG: at 11:01

## 2024-01-01 RX ADMIN — PIPERACILLIN AND TAZOBACTAM 3.38 G: 3; .375 INJECTION, POWDER, FOR SOLUTION INTRAVENOUS at 04:51

## 2024-01-01 RX ADMIN — MIDODRINE HYDROCHLORIDE 10 MG: 5 TABLET ORAL at 11:25

## 2024-01-01 RX ADMIN — HEPARIN SODIUM 5000 UNITS: 5000 INJECTION, SOLUTION INTRAVENOUS; SUBCUTANEOUS at 21:40

## 2024-01-01 RX ADMIN — OXYCODONE HYDROCHLORIDE 5 MG: 5 TABLET ORAL at 16:08

## 2024-01-01 RX ADMIN — PIPERACILLIN AND TAZOBACTAM 3.38 G: 3; .375 INJECTION, POWDER, FOR SOLUTION INTRAVENOUS at 19:53

## 2024-01-01 RX ADMIN — ALBUMIN (HUMAN) 100 G: 0.25 INJECTION, SOLUTION INTRAVENOUS at 04:19

## 2024-01-01 RX ADMIN — HYDROMORPHONE HYDROCHLORIDE 0.5 MG: 1 INJECTION, SOLUTION INTRAMUSCULAR; INTRAVENOUS; SUBCUTANEOUS at 12:10

## 2024-01-01 RX ADMIN — HYDROMORPHONE HYDROCHLORIDE 1 MG: 1 INJECTION, SOLUTION INTRAMUSCULAR; INTRAVENOUS; SUBCUTANEOUS at 08:39

## 2024-01-01 RX ADMIN — SODIUM BICARBONATE 150 MEQ: 84 INJECTION, SOLUTION INTRAVENOUS at 02:14

## 2024-01-01 RX ADMIN — MIDODRINE HYDROCHLORIDE 15 MG: 5 TABLET ORAL at 07:21

## 2024-01-01 RX ADMIN — PROCHLORPERAZINE EDISYLATE 10 MG: 5 INJECTION INTRAMUSCULAR; INTRAVENOUS at 05:43

## 2024-01-01 RX ADMIN — OMEPRAZOLE 40 MG: 20 CAPSULE, DELAYED RELEASE ORAL at 05:36

## 2024-01-01 RX ADMIN — NOREPINEPHRINE BITARTRATE 0.18 MCG/KG/MIN: 1 INJECTION, SOLUTION, CONCENTRATE INTRAVENOUS at 16:45

## 2024-01-01 RX ADMIN — OXYCODONE HYDROCHLORIDE 5 MG: 5 TABLET ORAL at 20:24

## 2024-01-01 RX ADMIN — RIFAXIMIN 550 MG: 550 TABLET ORAL at 05:51

## 2024-01-01 RX ADMIN — HYDROMORPHONE HYDROCHLORIDE 0.5 MG: 1 INJECTION, SOLUTION INTRAMUSCULAR; INTRAVENOUS; SUBCUTANEOUS at 05:46

## 2024-01-01 RX ADMIN — Medication 1334 MG: at 10:58

## 2024-01-01 RX ADMIN — OXYCODONE HYDROCHLORIDE 10 MG: 10 TABLET ORAL at 23:56

## 2024-01-01 RX ADMIN — SODIUM BICARBONATE 150 MEQ: 84 INJECTION, SOLUTION INTRAVENOUS at 21:26

## 2024-01-01 RX ADMIN — OMEPRAZOLE 20 MG: 20 CAPSULE, DELAYED RELEASE ORAL at 22:28

## 2024-01-01 RX ADMIN — HEPARIN SODIUM 3600 UNITS: 1000 INJECTION, SOLUTION INTRAVENOUS; SUBCUTANEOUS at 10:27

## 2024-01-01 RX ADMIN — ALBUMIN (HUMAN) 25 G: 0.25 INJECTION, SOLUTION INTRAVENOUS at 02:22

## 2024-01-01 RX ADMIN — HYDROMORPHONE HYDROCHLORIDE 0.5 MG: 1 INJECTION, SOLUTION INTRAMUSCULAR; INTRAVENOUS; SUBCUTANEOUS at 13:49

## 2024-01-01 RX ADMIN — CARBOXYMETHYLCELLULOSE SODIUM 1 DROP: 5 SOLUTION/ DROPS OPHTHALMIC at 15:27

## 2024-01-01 RX ADMIN — MIDODRINE HYDROCHLORIDE 15 MG: 5 TABLET ORAL at 08:20

## 2024-01-01 RX ADMIN — HYDROMORPHONE HYDROCHLORIDE 1 MG: 1 INJECTION, SOLUTION INTRAMUSCULAR; INTRAVENOUS; SUBCUTANEOUS at 07:10

## 2024-01-01 RX ADMIN — OXYCODONE HYDROCHLORIDE 10 MG: 10 TABLET ORAL at 05:58

## 2024-01-01 RX ADMIN — OXYCODONE HYDROCHLORIDE 10 MG: 10 TABLET ORAL at 13:38

## 2024-01-01 RX ADMIN — OXYCODONE HYDROCHLORIDE 5 MG: 5 TABLET ORAL at 13:03

## 2024-01-01 RX ADMIN — OMEPRAZOLE 40 MG: 20 CAPSULE, DELAYED RELEASE ORAL at 06:10

## 2024-01-01 RX ADMIN — ALBUMIN (HUMAN) 25 G: 0.25 INJECTION, SOLUTION INTRAVENOUS at 20:11

## 2024-01-01 RX ADMIN — SODIUM BICARBONATE 1300 MG: 650 TABLET ORAL at 06:06

## 2024-01-01 RX ADMIN — DEXTROSE MONOHYDRATE 50 ML: 25 INJECTION, SOLUTION INTRAVENOUS at 05:03

## 2024-01-01 RX ADMIN — HEPARIN SODIUM 5000 UNITS: 5000 INJECTION, SOLUTION INTRAVENOUS; SUBCUTANEOUS at 15:30

## 2024-01-01 RX ADMIN — SODIUM BICARBONATE 1300 MG: 650 TABLET ORAL at 17:29

## 2024-01-01 RX ADMIN — MORPHINE SULFATE 20 MG: 100 SOLUTION ORAL at 14:34

## 2024-01-01 RX ADMIN — Medication 1334 MG: at 08:33

## 2024-01-01 RX ADMIN — CEFTRIAXONE SODIUM 2000 MG: 10 INJECTION, POWDER, FOR SOLUTION INTRAVENOUS at 05:09

## 2024-01-01 RX ADMIN — Medication 1334 MG: at 13:03

## 2024-01-01 RX ADMIN — ANTACID TABLETS 500 MG: 500 TABLET, CHEWABLE ORAL at 17:44

## 2024-01-01 RX ADMIN — METRONIDAZOLE 500 MG: 500 INJECTION, SOLUTION INTRAVENOUS at 18:10

## 2024-01-01 RX ADMIN — RIFAXIMIN 550 MG: 550 TABLET ORAL at 05:07

## 2024-01-01 RX ADMIN — HEPARIN SODIUM 5000 UNITS: 5000 INJECTION, SOLUTION INTRAVENOUS; SUBCUTANEOUS at 05:34

## 2024-01-01 RX ADMIN — OXYCODONE HYDROCHLORIDE 5 MG: 5 TABLET ORAL at 05:54

## 2024-01-01 RX ADMIN — OMEPRAZOLE 40 MG: 20 CAPSULE, DELAYED RELEASE ORAL at 05:54

## 2024-01-01 RX ADMIN — Medication 1334 MG: at 06:41

## 2024-01-01 RX ADMIN — ONDANSETRON 4 MG: 2 INJECTION INTRAMUSCULAR; INTRAVENOUS at 19:48

## 2024-01-01 RX ADMIN — DEXTROSE MONOHYDRATE 50 ML: 25 INJECTION, SOLUTION INTRAVENOUS at 05:06

## 2024-01-01 RX ADMIN — OXYCODONE HYDROCHLORIDE 10 MG: 10 TABLET ORAL at 09:04

## 2024-01-01 RX ADMIN — MIDODRINE HYDROCHLORIDE 5 MG: 5 TABLET ORAL at 02:07

## 2024-01-01 RX ADMIN — LACTULOSE 30 ML: 20 SOLUTION ORAL at 05:33

## 2024-01-01 RX ADMIN — AMOXICILLIN AND CLAVULANATE POTASSIUM 1 TABLET: 875; 125 TABLET, FILM COATED ORAL at 06:11

## 2024-01-01 RX ADMIN — ONDANSETRON 4 MG: 2 INJECTION INTRAMUSCULAR; INTRAVENOUS at 00:02

## 2024-01-01 RX ADMIN — HYDROMORPHONE HYDROCHLORIDE 0.5 MG: 1 INJECTION, SOLUTION INTRAMUSCULAR; INTRAVENOUS; SUBCUTANEOUS at 05:05

## 2024-01-01 RX ADMIN — ONDANSETRON 4 MG: 4 TABLET, ORALLY DISINTEGRATING ORAL at 09:27

## 2024-01-01 RX ADMIN — TRAZODONE HYDROCHLORIDE 50 MG: 50 TABLET ORAL at 21:13

## 2024-01-01 RX ADMIN — RIFAXIMIN 550 MG: 550 TABLET ORAL at 06:11

## 2024-01-01 RX ADMIN — GABAPENTIN 300 MG: 300 CAPSULE ORAL at 05:56

## 2024-01-01 RX ADMIN — HYDROMORPHONE HYDROCHLORIDE 0.2 MG: 1 INJECTION, SOLUTION INTRAMUSCULAR; INTRAVENOUS; SUBCUTANEOUS at 10:32

## 2024-01-01 RX ADMIN — RIFAXIMIN 550 MG: 550 TABLET ORAL at 17:26

## 2024-01-01 RX ADMIN — MIDODRINE HYDROCHLORIDE 15 MG: 5 TABLET ORAL at 08:33

## 2024-01-01 RX ADMIN — NOREPINEPHRINE BITARTRATE 0.1 MCG/KG/MIN: 1 INJECTION, SOLUTION, CONCENTRATE INTRAVENOUS at 02:09

## 2024-01-01 RX ADMIN — FENTANYL CITRATE 50 MCG: 50 INJECTION, SOLUTION INTRAMUSCULAR; INTRAVENOUS at 11:20

## 2024-01-01 RX ADMIN — HYDROMORPHONE HYDROCHLORIDE 0.5 MG: 1 INJECTION, SOLUTION INTRAMUSCULAR; INTRAVENOUS; SUBCUTANEOUS at 18:12

## 2024-01-01 RX ADMIN — Medication 1334 MG: at 12:15

## 2024-01-01 RX ADMIN — OMEPRAZOLE 40 MG: 20 CAPSULE, DELAYED RELEASE ORAL at 05:07

## 2024-01-01 RX ADMIN — Medication 1334 MG: at 07:21

## 2024-01-01 RX ADMIN — LIDOCAINE HYDROCHLORIDE,EPINEPHRINE BITARTRATE: 10; .01 INJECTION, SOLUTION INFILTRATION; PERINEURAL at 10:19

## 2024-01-01 RX ADMIN — OXYCODONE HYDROCHLORIDE 10 MG: 10 TABLET ORAL at 17:35

## 2024-01-01 RX ADMIN — OXYCODONE HYDROCHLORIDE 5 MG: 5 TABLET ORAL at 19:52

## 2024-01-01 RX ADMIN — MIDODRINE HYDROCHLORIDE 15 MG: 5 TABLET ORAL at 10:59

## 2024-01-01 RX ADMIN — MIDODRINE HYDROCHLORIDE 10 MG: 5 TABLET ORAL at 08:12

## 2024-01-01 RX ADMIN — HYDROMORPHONE HYDROCHLORIDE 0.5 MG: 1 INJECTION, SOLUTION INTRAMUSCULAR; INTRAVENOUS; SUBCUTANEOUS at 22:12

## 2024-01-01 RX ADMIN — TRAZODONE HYDROCHLORIDE 50 MG: 50 TABLET ORAL at 20:35

## 2024-01-01 RX ADMIN — OMEPRAZOLE 40 MG: 20 CAPSULE, DELAYED RELEASE ORAL at 05:50

## 2024-01-01 RX ADMIN — HYDROMORPHONE HYDROCHLORIDE 0.5 MG: 1 INJECTION, SOLUTION INTRAMUSCULAR; INTRAVENOUS; SUBCUTANEOUS at 19:48

## 2024-01-01 RX ADMIN — HYDROMORPHONE HYDROCHLORIDE 1 MG: 1 INJECTION, SOLUTION INTRAMUSCULAR; INTRAVENOUS; SUBCUTANEOUS at 01:03

## 2024-01-01 RX ADMIN — NOREPINEPHRINE BITARTRATE 0.22 MCG/KG/MIN: 1 INJECTION, SOLUTION, CONCENTRATE INTRAVENOUS at 23:38

## 2024-01-01 RX ADMIN — HYDROMORPHONE HYDROCHLORIDE 0.5 MG: 1 INJECTION, SOLUTION INTRAMUSCULAR; INTRAVENOUS; SUBCUTANEOUS at 20:05

## 2024-01-01 RX ADMIN — HYDROMORPHONE HYDROCHLORIDE 1 MG: 1 INJECTION, SOLUTION INTRAMUSCULAR; INTRAVENOUS; SUBCUTANEOUS at 21:32

## 2024-01-01 RX ADMIN — GABAPENTIN 300 MG: 300 CAPSULE ORAL at 17:26

## 2024-01-01 RX ADMIN — PIPERACILLIN AND TAZOBACTAM 3.38 G: 3; .375 INJECTION, POWDER, FOR SOLUTION INTRAVENOUS at 16:38

## 2024-01-01 RX ADMIN — NOREPINEPHRINE BITARTRATE 0.09 MCG/KG/MIN: 1 INJECTION, SOLUTION, CONCENTRATE INTRAVENOUS at 03:27

## 2024-01-01 RX ADMIN — SODIUM CHLORIDE, POTASSIUM CHLORIDE, SODIUM LACTATE AND CALCIUM CHLORIDE: 600; 310; 30; 20 INJECTION, SOLUTION INTRAVENOUS at 11:17

## 2024-01-01 RX ADMIN — ANTACID TABLETS 500 MG: 500 TABLET, CHEWABLE ORAL at 05:07

## 2024-01-01 RX ADMIN — OXYCODONE HYDROCHLORIDE 10 MG: 10 TABLET ORAL at 06:08

## 2024-01-01 RX ADMIN — MIDODRINE HYDROCHLORIDE 15 MG: 5 TABLET ORAL at 16:32

## 2024-01-01 RX ADMIN — OXYCODONE HYDROCHLORIDE 10 MG: 10 TABLET ORAL at 13:22

## 2024-01-01 RX ADMIN — CEFAZOLIN 2 G: 1 INJECTION, POWDER, FOR SOLUTION INTRAMUSCULAR; INTRAVENOUS at 11:30

## 2024-01-01 RX ADMIN — NOREPINEPHRINE BITARTRATE 0.09 MCG/KG/MIN: 1 INJECTION, SOLUTION, CONCENTRATE INTRAVENOUS at 18:38

## 2024-01-01 RX ADMIN — PIPERACILLIN AND TAZOBACTAM 3.38 G: 3; .375 INJECTION, POWDER, FOR SOLUTION INTRAVENOUS at 20:29

## 2024-01-01 RX ADMIN — MIDODRINE HYDROCHLORIDE 15 MG: 5 TABLET ORAL at 04:41

## 2024-01-01 RX ADMIN — METHYLPREDNISOLONE SODIUM SUCCINATE 40 MG: 40 INJECTION, POWDER, FOR SOLUTION INTRAMUSCULAR; INTRAVENOUS at 06:24

## 2024-01-01 RX ADMIN — OMEPRAZOLE 40 MG: 20 CAPSULE, DELAYED RELEASE ORAL at 05:45

## 2024-01-01 RX ADMIN — HYDROMORPHONE HYDROCHLORIDE 0.5 MG: 1 INJECTION, SOLUTION INTRAMUSCULAR; INTRAVENOUS; SUBCUTANEOUS at 03:23

## 2024-01-01 RX ADMIN — RIFAXIMIN 550 MG: 550 TABLET ORAL at 06:14

## 2024-01-01 RX ADMIN — ALBUMIN (HUMAN) 25 G: 0.25 INJECTION, SOLUTION INTRAVENOUS at 12:34

## 2024-01-01 RX ADMIN — NOREPINEPHRINE BITARTRATE 0.1 MCG/KG/MIN: 1 INJECTION, SOLUTION, CONCENTRATE INTRAVENOUS at 08:11

## 2024-01-01 RX ADMIN — Medication 1334 MG: at 11:25

## 2024-01-01 RX ADMIN — HEPARIN SODIUM 5000 UNITS: 5000 INJECTION, SOLUTION INTRAVENOUS; SUBCUTANEOUS at 00:22

## 2024-01-01 RX ADMIN — RIFAXIMIN 550 MG: 550 TABLET ORAL at 22:28

## 2024-01-01 RX ADMIN — PROCHLORPERAZINE EDISYLATE 10 MG: 5 INJECTION INTRAMUSCULAR; INTRAVENOUS at 18:12

## 2024-01-01 RX ADMIN — HYDROMORPHONE HYDROCHLORIDE 0.5 MG: 1 INJECTION, SOLUTION INTRAMUSCULAR; INTRAVENOUS; SUBCUTANEOUS at 06:20

## 2024-01-01 RX ADMIN — MIDODRINE HYDROCHLORIDE 15 MG: 5 TABLET ORAL at 06:41

## 2024-01-01 RX ADMIN — RIFAXIMIN 550 MG: 550 TABLET ORAL at 04:35

## 2024-01-01 RX ADMIN — CALCIUM GLUCONATE 1 G: 20 INJECTION, SOLUTION INTRAVENOUS at 23:50

## 2024-01-01 RX ADMIN — ALBUMIN (HUMAN) 25 G: 0.25 INJECTION, SOLUTION INTRAVENOUS at 06:08

## 2024-01-01 RX ADMIN — HYDROMORPHONE HYDROCHLORIDE 0.2 MG: 1 INJECTION, SOLUTION INTRAMUSCULAR; INTRAVENOUS; SUBCUTANEOUS at 06:21

## 2024-01-01 RX ADMIN — SODIUM BICARBONATE 150 MEQ: 84 INJECTION, SOLUTION INTRAVENOUS at 01:53

## 2024-01-01 RX ADMIN — OMEPRAZOLE 40 MG: 20 CAPSULE, DELAYED RELEASE ORAL at 06:11

## 2024-01-01 RX ADMIN — LACTULOSE 30 ML: 20 SOLUTION ORAL at 04:48

## 2024-01-01 RX ADMIN — Medication 1334 MG: at 08:41

## 2024-01-01 RX ADMIN — PROCHLORPERAZINE EDISYLATE 10 MG: 5 INJECTION INTRAMUSCULAR; INTRAVENOUS at 13:53

## 2024-01-01 RX ADMIN — ALBUMIN (HUMAN) 75 G: 0.25 INJECTION, SOLUTION INTRAVENOUS at 16:47

## 2024-01-01 RX ADMIN — SCOPOLAMINE 1 PATCH: 1.5 PATCH, EXTENDED RELEASE TRANSDERMAL at 10:35

## 2024-01-01 RX ADMIN — MIDODRINE HYDROCHLORIDE 15 MG: 5 TABLET ORAL at 11:23

## 2024-01-01 RX ADMIN — NOREPINEPHRINE BITARTRATE 0.11 MCG/KG/MIN: 1 INJECTION, SOLUTION, CONCENTRATE INTRAVENOUS at 12:01

## 2024-01-01 RX ADMIN — LACTULOSE 30 ML: 20 SOLUTION ORAL at 05:51

## 2024-01-01 RX ADMIN — GABAPENTIN 300 MG: 300 CAPSULE ORAL at 05:24

## 2024-01-01 RX ADMIN — HEPARIN SODIUM 5000 UNITS: 5000 INJECTION, SOLUTION INTRAVENOUS; SUBCUTANEOUS at 05:54

## 2024-01-01 RX ADMIN — FUROSEMIDE 20 MG/HR: 10 INJECTION, SOLUTION INTRAVENOUS at 03:45

## 2024-01-01 RX ADMIN — SODIUM BICARBONATE 650 MG: 650 TABLET ORAL at 16:50

## 2024-01-01 RX ADMIN — OXYCODONE HYDROCHLORIDE 5 MG: 5 TABLET ORAL at 12:16

## 2024-01-01 RX ADMIN — HEPARIN SODIUM 5000 UNITS: 5000 INJECTION, SOLUTION INTRAVENOUS; SUBCUTANEOUS at 22:29

## 2024-01-01 RX ADMIN — GABAPENTIN 300 MG: 300 CAPSULE ORAL at 06:08

## 2024-01-01 RX ADMIN — MAGNESIUM SULFATE HEPTAHYDRATE 2 G: 2 INJECTION, SOLUTION INTRAVENOUS at 07:43

## 2024-01-01 RX ADMIN — LORAZEPAM 1 MG: 2 INJECTION INTRAMUSCULAR; INTRAVENOUS at 15:23

## 2024-01-01 RX ADMIN — LACTULOSE 30 ML: 20 SOLUTION ORAL at 05:46

## 2024-01-01 RX ADMIN — CEFTRIAXONE SODIUM 2000 MG: 2 INJECTION, POWDER, FOR SOLUTION INTRAMUSCULAR; INTRAVENOUS at 05:26

## 2024-01-01 RX ADMIN — OMEPRAZOLE 40 MG: 20 CAPSULE, DELAYED RELEASE ORAL at 04:48

## 2024-01-01 RX ADMIN — BENZOCAINE AND MENTHOL, UNSPECIFIED FORM 1 LOZENGE: 15; 3.6 LOZENGE ORAL at 00:00

## 2024-01-01 RX ADMIN — RIFAXIMIN 550 MG: 550 TABLET ORAL at 05:24

## 2024-01-01 RX ADMIN — HYDROMORPHONE HYDROCHLORIDE 0.2 MG: 1 INJECTION, SOLUTION INTRAMUSCULAR; INTRAVENOUS; SUBCUTANEOUS at 06:36

## 2024-01-01 RX ADMIN — MIDODRINE HYDROCHLORIDE 15 MG: 5 TABLET ORAL at 12:15

## 2024-01-01 RX ADMIN — FUROSEMIDE 5 MG/HR: 10 INJECTION, SOLUTION INTRAVENOUS at 19:49

## 2024-01-01 RX ADMIN — OXYCODONE HYDROCHLORIDE 5 MG: 5 TABLET ORAL at 17:20

## 2024-01-01 RX ADMIN — HEPARIN SODIUM 5000 UNITS: 5000 INJECTION, SOLUTION INTRAVENOUS; SUBCUTANEOUS at 06:08

## 2024-01-01 RX ADMIN — SODIUM CHLORIDE 1000 ML: 9 INJECTION, SOLUTION INTRAVENOUS at 21:45

## 2024-01-01 RX ADMIN — HYDROMORPHONE HYDROCHLORIDE 1 MG: 1 INJECTION, SOLUTION INTRAMUSCULAR; INTRAVENOUS; SUBCUTANEOUS at 15:58

## 2024-01-01 RX ADMIN — HYDROMORPHONE HYDROCHLORIDE 1 MG: 1 INJECTION, SOLUTION INTRAMUSCULAR; INTRAVENOUS; SUBCUTANEOUS at 19:13

## 2024-01-01 RX ADMIN — HYDROMORPHONE HYDROCHLORIDE 0.5 MG: 1 INJECTION, SOLUTION INTRAMUSCULAR; INTRAVENOUS; SUBCUTANEOUS at 05:43

## 2024-01-01 RX ADMIN — PROCHLORPERAZINE EDISYLATE 10 MG: 5 INJECTION INTRAMUSCULAR; INTRAVENOUS at 05:46

## 2024-01-01 RX ADMIN — HYDROMORPHONE HYDROCHLORIDE 1 MG: 1 INJECTION, SOLUTION INTRAMUSCULAR; INTRAVENOUS; SUBCUTANEOUS at 08:57

## 2024-01-01 RX ADMIN — MIDODRINE HYDROCHLORIDE 15 MG: 5 TABLET ORAL at 16:39

## 2024-01-01 RX ADMIN — OXYCODONE HYDROCHLORIDE 10 MG: 10 TABLET ORAL at 21:34

## 2024-01-01 RX ADMIN — OXYCODONE HYDROCHLORIDE 10 MG: 10 TABLET ORAL at 20:35

## 2024-01-01 RX ADMIN — PIPERACILLIN AND TAZOBACTAM 3.38 G: 3; .375 INJECTION, POWDER, FOR SOLUTION INTRAVENOUS at 04:35

## 2024-01-01 RX ADMIN — HYDROMORPHONE HYDROCHLORIDE 0.5 MG: 1 INJECTION, SOLUTION INTRAMUSCULAR; INTRAVENOUS; SUBCUTANEOUS at 14:28

## 2024-01-01 RX ADMIN — LACTULOSE 30 ML: 20 SOLUTION ORAL at 06:10

## 2024-01-01 RX ADMIN — AMOXICILLIN AND CLAVULANATE POTASSIUM 1 TABLET: 875; 125 TABLET, FILM COATED ORAL at 17:26

## 2024-01-01 RX ADMIN — OMEPRAZOLE 40 MG: 20 CAPSULE, DELAYED RELEASE ORAL at 04:35

## 2024-01-01 RX ADMIN — HEPARIN SODIUM 5000 UNITS: 5000 INJECTION, SOLUTION INTRAVENOUS; SUBCUTANEOUS at 05:07

## 2024-01-01 RX ADMIN — OXYCODONE HYDROCHLORIDE 5 MG: 5 TABLET ORAL at 10:58

## 2024-01-01 RX ADMIN — GABAPENTIN 300 MG: 300 CAPSULE ORAL at 16:56

## 2024-01-01 RX ADMIN — CEFTRIAXONE SODIUM 2000 MG: 10 INJECTION, POWDER, FOR SOLUTION INTRAVENOUS at 06:07

## 2024-01-01 RX ADMIN — HYDROMORPHONE HYDROCHLORIDE 0.5 MG: 1 INJECTION, SOLUTION INTRAMUSCULAR; INTRAVENOUS; SUBCUTANEOUS at 22:51

## 2024-01-01 RX ADMIN — RIFAXIMIN 550 MG: 550 TABLET ORAL at 16:49

## 2024-01-01 RX ADMIN — RIFAXIMIN 550 MG: 550 TABLET ORAL at 18:03

## 2024-01-01 RX ADMIN — LACTULOSE 30 ML: 20 SOLUTION ORAL at 05:44

## 2024-01-01 RX ADMIN — ALBUMIN (HUMAN) 150 G: 0.25 INJECTION, SOLUTION INTRAVENOUS at 11:36

## 2024-01-01 RX ADMIN — Medication 667 MG: at 08:58

## 2024-01-01 RX ADMIN — SODIUM BICARBONATE 650 MG: 650 TABLET ORAL at 12:36

## 2024-01-01 RX ADMIN — EPHEDRINE SULFATE 10 MG: 50 INJECTION, SOLUTION INTRAVENOUS at 11:33

## 2024-01-01 RX ADMIN — NOREPINEPHRINE BITARTRATE 0.22 MCG/KG/MIN: 1 INJECTION, SOLUTION, CONCENTRATE INTRAVENOUS at 07:46

## 2024-01-01 RX ADMIN — Medication 667 MG: at 12:27

## 2024-01-01 RX ADMIN — SUGAMMADEX 200 MG: 100 INJECTION, SOLUTION INTRAVENOUS at 11:56

## 2024-01-01 RX ADMIN — MIDODRINE HYDROCHLORIDE 15 MG: 5 TABLET ORAL at 17:20

## 2024-01-01 RX ADMIN — SODIUM CHLORIDE, POTASSIUM CHLORIDE, SODIUM LACTATE AND CALCIUM CHLORIDE 1000 ML: 600; 310; 30; 20 INJECTION, SOLUTION INTRAVENOUS at 08:30

## 2024-01-01 RX ADMIN — LACTULOSE 30 ML: 20 SOLUTION ORAL at 17:31

## 2024-01-01 RX ADMIN — RIFAXIMIN 550 MG: 550 TABLET ORAL at 05:36

## 2024-01-01 RX ADMIN — Medication 1334 MG: at 16:40

## 2024-01-01 RX ADMIN — SENNOSIDES AND DOCUSATE SODIUM 2 TABLET: 50; 8.6 TABLET ORAL at 06:16

## 2024-01-01 SDOH — ECONOMIC STABILITY: TRANSPORTATION INSECURITY
IN THE PAST 12 MONTHS, HAS THE LACK OF TRANSPORTATION KEPT YOU FROM MEDICAL APPOINTMENTS OR FROM GETTING MEDICATIONS?: NO

## 2024-01-01 SDOH — ECONOMIC STABILITY: TRANSPORTATION INSECURITY
IN THE PAST 12 MONTHS, HAS LACK OF RELIABLE TRANSPORTATION KEPT YOU FROM MEDICAL APPOINTMENTS, MEETINGS, WORK OR FROM GETTING THINGS NEEDED FOR DAILY LIVING?: NO

## 2024-01-01 ASSESSMENT — PAIN DESCRIPTION - PAIN TYPE
TYPE: ACUTE PAIN
TYPE: CHRONIC PAIN
TYPE: ACUTE PAIN
TYPE: CHRONIC PAIN
TYPE: ACUTE PAIN
TYPE: CHRONIC PAIN
TYPE: ACUTE PAIN
TYPE: CHRONIC PAIN
TYPE: ACUTE PAIN
TYPE: CHRONIC PAIN
TYPE: ACUTE PAIN

## 2024-01-01 ASSESSMENT — ENCOUNTER SYMPTOMS
COUGH: 0
CONSTIPATION: 0
VOMITING: 0
CARDIOVASCULAR NEGATIVE: 1
SPEECH CHANGE: 0
SHORTNESS OF BREATH: 0
SPEECH CHANGE: 0
DEPRESSION: 0
FEVER: 0
NAUSEA: 0
HALLUCINATIONS: 0
SORE THROAT: 0
TINGLING: 0
SORE THROAT: 0
NAUSEA: 0
DOUBLE VISION: 0
CONSTIPATION: 0
ABDOMINAL PAIN: 0
SPEECH CHANGE: 0
SENSORY CHANGE: 0
WEAKNESS: 1
NAUSEA: 0
TINGLING: 0
FLANK PAIN: 0
WEIGHT LOSS: 0
HEADACHES: 0
MYALGIAS: 0
SHORTNESS OF BREATH: 0
BLOOD IN STOOL: 0
CONSTIPATION: 0
FOCAL WEAKNESS: 0
NAUSEA: 1
SHORTNESS OF BREATH: 0
VOMITING: 0
COUGH: 0
NECK PAIN: 0
PALPITATIONS: 0
ORTHOPNEA: 0
INSOMNIA: 0
INSOMNIA: 0
TINGLING: 0
BLURRED VISION: 0
CHILLS: 0
CARDIOVASCULAR NEGATIVE: 1
SPEECH CHANGE: 0
SHORTNESS OF BREATH: 0
HEADACHES: 0
NAUSEA: 1
EYE PAIN: 0
NAUSEA: 1
SHORTNESS OF BREATH: 0
BACK PAIN: 0
DIAPHORESIS: 0
MYALGIAS: 0
COUGH: 0
PHOTOPHOBIA: 0
MUSCULOSKELETAL NEGATIVE: 1
NERVOUS/ANXIOUS: 1
HALLUCINATIONS: 0
SENSORY CHANGE: 0
DOUBLE VISION: 0
COUGH: 0
WEIGHT LOSS: 0
ABDOMINAL PAIN: 1
FEVER: 0
ABDOMINAL PAIN: 1
SPEECH CHANGE: 0
MYALGIAS: 0
NERVOUS/ANXIOUS: 0
BRUISES/BLEEDS EASILY: 0
MYALGIAS: 0
TREMORS: 0
MUSCULOSKELETAL NEGATIVE: 1
FEVER: 0
COUGH: 0
SHORTNESS OF BREATH: 0
CHILLS: 0
WEIGHT LOSS: 0
HEADACHES: 0
SPEECH CHANGE: 0
DEPRESSION: 0
WEIGHT LOSS: 0
ORTHOPNEA: 0
CARDIOVASCULAR NEGATIVE: 1
FEVER: 0
FEVER: 0
HEADACHES: 0
WEAKNESS: 0
EYE PAIN: 0
SHORTNESS OF BREATH: 0
SPUTUM PRODUCTION: 0
PALPITATIONS: 0
VOMITING: 0
DIARRHEA: 0
NERVOUS/ANXIOUS: 0
FEVER: 0
DIZZINESS: 0
BLURRED VISION: 0
NERVOUS/ANXIOUS: 0
DIARRHEA: 0
TREMORS: 0
DIARRHEA: 0
DIAPHORESIS: 0
HEARTBURN: 0
ORTHOPNEA: 0
SHORTNESS OF BREATH: 0
VOMITING: 0
MYALGIAS: 0
WEAKNESS: 1
SENSORY CHANGE: 0
NAUSEA: 0
ABDOMINAL PAIN: 1
HALLUCINATIONS: 0
COUGH: 0
SHORTNESS OF BREATH: 0
WEAKNESS: 1
PALPITATIONS: 0
FEVER: 0
INSOMNIA: 0
COUGH: 0
HEARTBURN: 0
VOMITING: 0
WEAKNESS: 0
FEVER: 0
DEPRESSION: 0
TINGLING: 0
FOCAL WEAKNESS: 0
ABDOMINAL PAIN: 0
NAUSEA: 0
BACK PAIN: 0
BACK PAIN: 0
WEAKNESS: 1
HEARTBURN: 0
DIZZINESS: 0
MYALGIAS: 0
DIARRHEA: 0
CLAUDICATION: 0
COUGH: 0
DIZZINESS: 0
BLURRED VISION: 0
WEAKNESS: 0
ABDOMINAL PAIN: 1
PALPITATIONS: 0
PALPITATIONS: 0
DIARRHEA: 0
SHORTNESS OF BREATH: 0
FOCAL WEAKNESS: 0
ABDOMINAL PAIN: 0
CHILLS: 0
ABDOMINAL PAIN: 1
VOMITING: 0
NECK PAIN: 0
NAUSEA: 0
VOMITING: 0
CONSTIPATION: 0
CHILLS: 0
SHORTNESS OF BREATH: 0
CHILLS: 0
PSYCHIATRIC NEGATIVE: 1
NECK PAIN: 0
HEARTBURN: 0
CHILLS: 0
COUGH: 0
NECK PAIN: 0
PHOTOPHOBIA: 0
FEVER: 0
SPEECH CHANGE: 0
VOMITING: 0
HEADACHES: 0
CHILLS: 0
DIZZINESS: 0
SENSORY CHANGE: 0
HEADACHES: 0
FOCAL WEAKNESS: 0
BLURRED VISION: 0
FEVER: 0
HEARTBURN: 0
NAUSEA: 0
BLURRED VISION: 0
SHORTNESS OF BREATH: 0
BACK PAIN: 0
ORTHOPNEA: 0
HEARTBURN: 0
WHEEZING: 0
PALPITATIONS: 0
PSYCHIATRIC NEGATIVE: 1
ABDOMINAL PAIN: 0
DIARRHEA: 0
HEARTBURN: 0
WEAKNESS: 0
DIZZINESS: 0
FEVER: 0
DIZZINESS: 0
NECK PAIN: 0
CHILLS: 0
PHOTOPHOBIA: 0
NAUSEA: 1
RESPIRATORY NEGATIVE: 1
TREMORS: 0
SENSORY CHANGE: 0
NERVOUS/ANXIOUS: 1
SPEECH CHANGE: 0
DIZZINESS: 0
PALPITATIONS: 0
VOMITING: 0
COUGH: 0
HALLUCINATIONS: 0
HALLUCINATIONS: 0
INSOMNIA: 0
BLURRED VISION: 0
DIZZINESS: 0
NECK PAIN: 0
PSYCHIATRIC NEGATIVE: 1
MYALGIAS: 0
ABDOMINAL PAIN: 1
CHILLS: 0
CHILLS: 0
MYALGIAS: 0
WHEEZING: 0
PHOTOPHOBIA: 0
MYALGIAS: 0
HEADACHES: 0
MYALGIAS: 0
HEADACHES: 0
WEAKNESS: 1
SPEECH CHANGE: 0
COUGH: 0
NERVOUS/ANXIOUS: 0
CHILLS: 0
DIARRHEA: 0
DIZZINESS: 0
SHORTNESS OF BREATH: 0
BLURRED VISION: 0
FEVER: 0
DIZZINESS: 0
WEAKNESS: 0
ABDOMINAL PAIN: 1
DIZZINESS: 0
COUGH: 0
WEAKNESS: 0
DIZZINESS: 0
CLAUDICATION: 0
BLURRED VISION: 0
DIARRHEA: 0
ABDOMINAL PAIN: 1
PALPITATIONS: 0
ABDOMINAL PAIN: 1
ABDOMINAL PAIN: 0
SPEECH CHANGE: 0
SPEECH CHANGE: 0
PHOTOPHOBIA: 0
HEADACHES: 0
DIARRHEA: 0
ABDOMINAL PAIN: 1
PHOTOPHOBIA: 0
SHORTNESS OF BREATH: 0
EYE PAIN: 0
CHILLS: 0
DIAPHORESIS: 0
NAUSEA: 1
FEVER: 0
NECK PAIN: 0
INSOMNIA: 0
BLURRED VISION: 0
NAUSEA: 1
SENSORY CHANGE: 0
BLURRED VISION: 0
COUGH: 0
DOUBLE VISION: 0
ABDOMINAL PAIN: 1
TINGLING: 0
SHORTNESS OF BREATH: 0
SPUTUM PRODUCTION: 0
NAUSEA: 0
INSOMNIA: 0
CHILLS: 0
EYES NEGATIVE: 1
DEPRESSION: 0
CLAUDICATION: 0
DIZZINESS: 0
WHEEZING: 0
WEAKNESS: 0
TREMORS: 0
CONSTIPATION: 0
VOMITING: 0
DIZZINESS: 0
SHORTNESS OF BREATH: 0
SENSORY CHANGE: 0
WEIGHT LOSS: 0
PHOTOPHOBIA: 0
DIZZINESS: 0
SENSORY CHANGE: 0
MYALGIAS: 0
VOMITING: 0
FOCAL WEAKNESS: 0
HEADACHES: 0
VOMITING: 1
BACK PAIN: 0
ABDOMINAL PAIN: 1
WEAKNESS: 1
BLURRED VISION: 0
SHORTNESS OF BREATH: 0
DIZZINESS: 0
NERVOUS/ANXIOUS: 0
NERVOUS/ANXIOUS: 0
FLANK PAIN: 0
NECK PAIN: 0
DIARRHEA: 0
COUGH: 0
NERVOUS/ANXIOUS: 1
CONSTIPATION: 0
DOUBLE VISION: 0
CHILLS: 0
SENSORY CHANGE: 0
SORE THROAT: 0
DIARRHEA: 0
PHOTOPHOBIA: 0
SENSORY CHANGE: 0
DIZZINESS: 0
MYALGIAS: 0
SPUTUM PRODUCTION: 0
EYE PAIN: 0
HEADACHES: 0
FEVER: 0
SENSORY CHANGE: 0
BLURRED VISION: 0
MYALGIAS: 0
CLAUDICATION: 0
RESPIRATORY NEGATIVE: 1
SORE THROAT: 0
SPEECH CHANGE: 0
DEPRESSION: 0
NERVOUS/ANXIOUS: 0
HEADACHES: 0
BLURRED VISION: 0
ABDOMINAL PAIN: 0
BACK PAIN: 0
RESPIRATORY NEGATIVE: 1
NAUSEA: 0
DOUBLE VISION: 0
CONSTIPATION: 0
FLANK PAIN: 0
BACK PAIN: 0
COUGH: 0
HEADACHES: 0
FOCAL WEAKNESS: 0
SPEECH CHANGE: 0
SPUTUM PRODUCTION: 0
SHORTNESS OF BREATH: 0
PALPITATIONS: 0
SPEECH CHANGE: 0
FEVER: 0
BACK PAIN: 0
CONSTIPATION: 0
MUSCULOSKELETAL NEGATIVE: 1
EYE PAIN: 0
NERVOUS/ANXIOUS: 0
CONSTIPATION: 0
MYALGIAS: 0
ABDOMINAL PAIN: 0
SENSORY CHANGE: 0
ABDOMINAL PAIN: 1
BLURRED VISION: 0
PHOTOPHOBIA: 0
PHOTOPHOBIA: 0
SPUTUM PRODUCTION: 0
DOUBLE VISION: 0
NAUSEA: 0
PALPITATIONS: 0
HEARTBURN: 0
FEVER: 0
ABDOMINAL PAIN: 1
PALPITATIONS: 0
SHORTNESS OF BREATH: 0
EYES NEGATIVE: 1
NECK PAIN: 0
BLOOD IN STOOL: 0
VOMITING: 0
PHOTOPHOBIA: 0
BACK PAIN: 0
DIARRHEA: 0
VOMITING: 0
FOCAL WEAKNESS: 0
NERVOUS/ANXIOUS: 0
EYES NEGATIVE: 1
BLURRED VISION: 0
INSOMNIA: 0
CONSTIPATION: 0
SPEECH CHANGE: 0
DIZZINESS: 1
VOMITING: 0
ABDOMINAL PAIN: 0
MYALGIAS: 0
FOCAL WEAKNESS: 0
HEADACHES: 0
HEARTBURN: 0
HEADACHES: 0
SENSORY CHANGE: 0
ORTHOPNEA: 0
DIARRHEA: 0
SENSORY CHANGE: 0
FEVER: 0
VOMITING: 0
CONSTIPATION: 0
CLAUDICATION: 0
DIZZINESS: 0
DIARRHEA: 0
DEPRESSION: 0
SPEECH CHANGE: 0
DEPRESSION: 0
NECK PAIN: 0
TREMORS: 0
CLAUDICATION: 0

## 2024-01-01 ASSESSMENT — COGNITIVE AND FUNCTIONAL STATUS - GENERAL
MOVING TO AND FROM BED TO CHAIR: A LOT
TURNING FROM BACK TO SIDE WHILE IN FLAT BAD: A LITTLE
SUGGESTED CMS G CODE MODIFIER MOBILITY: CL
WALKING IN HOSPITAL ROOM: A LITTLE
TOILETING: A LITTLE
DRESSING REGULAR LOWER BODY CLOTHING: A LOT
WALKING IN HOSPITAL ROOM: A LITTLE
PERSONAL GROOMING: A LITTLE
TURNING FROM BACK TO SIDE WHILE IN FLAT BAD: A LITTLE
SUGGESTED CMS G CODE MODIFIER DAILY ACTIVITY: CH
MOBILITY SCORE: 14
HELP NEEDED FOR BATHING: A LOT
STANDING UP FROM CHAIR USING ARMS: A LOT
DAILY ACTIVITIY SCORE: 24
DAILY ACTIVITIY SCORE: 16
EATING MEALS: A LITTLE
MOBILITY SCORE: 17
SUGGESTED CMS G CODE MODIFIER MOBILITY: CK
MOBILITY SCORE: 23
SUGGESTED CMS G CODE MODIFIER DAILY ACTIVITY: CK
SUGGESTED CMS G CODE MODIFIER MOBILITY: CI
MOVING FROM LYING ON BACK TO SITTING ON SIDE OF FLAT BED: A LITTLE
MOBILITY SCORE: 23
SUGGESTED CMS G CODE MODIFIER DAILY ACTIVITY: CH
WALKING IN HOSPITAL ROOM: A LOT
WALKING IN HOSPITAL ROOM: A LITTLE
STANDING UP FROM CHAIR USING ARMS: A LITTLE
SUGGESTED CMS G CODE MODIFIER MOBILITY: CI
DAILY ACTIVITIY SCORE: 24
DRESSING REGULAR UPPER BODY CLOTHING: A LITTLE
MOVING FROM LYING ON BACK TO SITTING ON SIDE OF FLAT BED: A LITTLE
MOVING TO AND FROM BED TO CHAIR: A LITTLE
CLIMB 3 TO 5 STEPS WITH RAILING: A LOT
CLIMB 3 TO 5 STEPS WITH RAILING: A LOT

## 2024-01-01 ASSESSMENT — LIFESTYLE VARIABLES
CONSUMPTION TOTAL: NEGATIVE
ALCOHOL_USE: NO
DOES PATIENT WANT TO STOP DRINKING: NO
HOW MANY TIMES IN THE PAST YEAR HAVE YOU HAD 5 OR MORE DRINKS IN A DAY: 0
TOTAL SCORE: 0
HAVE PEOPLE ANNOYED YOU BY CRITICIZING YOUR DRINKING: NO
TOTAL SCORE: 0
ON A TYPICAL DAY WHEN YOU DRINK ALCOHOL HOW MANY DRINKS DO YOU HAVE: 0
SUBSTANCE_ABUSE: 0
AVERAGE NUMBER OF DAYS PER WEEK YOU HAVE A DRINK CONTAINING ALCOHOL: 0
TOTAL SCORE: 0
TOTAL SCORE: 0
SUBSTANCE_ABUSE: 0
HAVE PEOPLE ANNOYED YOU BY CRITICIZING YOUR DRINKING: NO
HAVE YOU EVER FELT YOU SHOULD CUT DOWN ON YOUR DRINKING: NO
SUBSTANCE_ABUSE: 0
HOW MANY TIMES IN THE PAST YEAR HAVE YOU HAD 5 OR MORE DRINKS IN A DAY: 0
EVER HAD A DRINK FIRST THING IN THE MORNING TO STEADY YOUR NERVES TO GET RID OF A HANGOVER: NO
DOES PATIENT WANT TO STOP DRINKING: NO
EVER FELT BAD OR GUILTY ABOUT YOUR DRINKING: NO
EVER FELT BAD OR GUILTY ABOUT YOUR DRINKING: NO
CONSUMPTION TOTAL: NEGATIVE
HAVE YOU EVER FELT YOU SHOULD CUT DOWN ON YOUR DRINKING: NO
ALCOHOL_USE: NO
AVERAGE NUMBER OF DAYS PER WEEK YOU HAVE A DRINK CONTAINING ALCOHOL: 0
SUBSTANCE_ABUSE: 0
ON A TYPICAL DAY WHEN YOU DRINK ALCOHOL HOW MANY DRINKS DO YOU HAVE: 0
EVER HAD A DRINK FIRST THING IN THE MORNING TO STEADY YOUR NERVES TO GET RID OF A HANGOVER: NO
SUBSTANCE_ABUSE: 0

## 2024-01-01 ASSESSMENT — GAIT ASSESSMENTS
DISTANCE (FEET): 10
GAIT LEVEL OF ASSIST: CONTACT GUARD ASSIST
DEVIATION: INCREASED BASE OF SUPPORT

## 2024-01-01 ASSESSMENT — PATIENT HEALTH QUESTIONNAIRE - PHQ9
1. LITTLE INTEREST OR PLEASURE IN DOING THINGS: NOT AT ALL
1. LITTLE INTEREST OR PLEASURE IN DOING THINGS: NOT AT ALL
2. FEELING DOWN, DEPRESSED, IRRITABLE, OR HOPELESS: NOT AT ALL
1. LITTLE INTEREST OR PLEASURE IN DOING THINGS: NOT AT ALL
SUM OF ALL RESPONSES TO PHQ9 QUESTIONS 1 AND 2: 0
SUM OF ALL RESPONSES TO PHQ9 QUESTIONS 1 AND 2: 0
1. LITTLE INTEREST OR PLEASURE IN DOING THINGS: NOT AT ALL
SUM OF ALL RESPONSES TO PHQ9 QUESTIONS 1 AND 2: 0
2. FEELING DOWN, DEPRESSED, IRRITABLE, OR HOPELESS: NOT AT ALL
1. LITTLE INTEREST OR PLEASURE IN DOING THINGS: NOT AT ALL
SUM OF ALL RESPONSES TO PHQ9 QUESTIONS 1 AND 2: 0
SUM OF ALL RESPONSES TO PHQ9 QUESTIONS 1 AND 2: 0

## 2024-01-01 ASSESSMENT — SOCIAL DETERMINANTS OF HEALTH (SDOH)
WITHIN THE LAST YEAR, HAVE YOU BEEN HUMILIATED OR EMOTIONALLY ABUSED IN OTHER WAYS BY YOUR PARTNER OR EX-PARTNER?: NO
WITHIN THE LAST YEAR, HAVE TO BEEN RAPED OR FORCED TO HAVE ANY KIND OF SEXUAL ACTIVITY BY YOUR PARTNER OR EX-PARTNER?: NO
WITHIN THE LAST YEAR, HAVE YOU BEEN KICKED, HIT, SLAPPED, OR OTHERWISE PHYSICALLY HURT BY YOUR PARTNER OR EX-PARTNER?: NO
WITHIN THE LAST YEAR, HAVE TO BEEN RAPED OR FORCED TO HAVE ANY KIND OF SEXUAL ACTIVITY BY YOUR PARTNER OR EX-PARTNER?: NO
WITHIN THE LAST YEAR, HAVE YOU BEEN AFRAID OF YOUR PARTNER OR EX-PARTNER?: NO
IN THE PAST 12 MONTHS, HAS THE ELECTRIC, GAS, OIL, OR WATER COMPANY THREATENED TO SHUT OFF SERVICE IN YOUR HOME?: NO
WITHIN THE LAST YEAR, HAVE YOU BEEN AFRAID OF YOUR PARTNER OR EX-PARTNER?: NO
WITHIN THE LAST YEAR, HAVE YOU BEEN KICKED, HIT, SLAPPED, OR OTHERWISE PHYSICALLY HURT BY YOUR PARTNER OR EX-PARTNER?: NO
WITHIN THE PAST 12 MONTHS, YOU WORRIED THAT YOUR FOOD WOULD RUN OUT BEFORE YOU GOT THE MONEY TO BUY MORE: NEVER TRUE
WITHIN THE PAST 12 MONTHS, THE FOOD YOU BOUGHT JUST DIDN'T LAST AND YOU DIDN'T HAVE MONEY TO GET MORE: NEVER TRUE
WITHIN THE LAST YEAR, HAVE YOU BEEN HUMILIATED OR EMOTIONALLY ABUSED IN OTHER WAYS BY YOUR PARTNER OR EX-PARTNER?: NO

## 2024-01-01 ASSESSMENT — FIBROSIS 4 INDEX
FIB4 SCORE: 13.32
FIB4 SCORE: 7.41
FIB4 SCORE: 11.63
FIB4 SCORE: 11.63
FIB4 SCORE: 10.2
FIB4 SCORE: 11.75
FIB4 SCORE: 4.32
FIB4 SCORE: 7.41
FIB4 SCORE: 12.79
FIB4 SCORE: 10.58
FIB4 SCORE: 4.12
FIB4 SCORE: 4.12

## 2024-01-01 ASSESSMENT — PAIN SCALES - WONG BAKER
WONGBAKER_NUMERICALRESPONSE: DOESN'T HURT AT ALL
WONGBAKER_NUMERICALRESPONSE: DOESN'T HURT AT ALL
WONGBAKER_NUMERICALRESPONSE: HURTS JUST A LITTLE BIT

## 2024-01-01 ASSESSMENT — PAIN SCALES - GENERAL: PAIN_LEVEL: 2

## 2024-05-21 ENCOUNTER — HOSPITAL ENCOUNTER (OUTPATIENT)
Dept: RADIOLOGY | Facility: MEDICAL CENTER | Age: 54
End: 2024-05-21

## 2024-06-01 PROBLEM — K85.10 ACUTE GALLSTONE PANCREATITIS: Status: ACTIVE | Noted: 2024-01-01

## 2024-06-01 PROBLEM — Z71.6 ENCOUNTER FOR TOBACCO USE CESSATION COUNSELING: Status: ACTIVE | Noted: 2024-01-01

## 2024-06-01 PROBLEM — K85.10 GALLSTONE PANCREATITIS: Status: ACTIVE | Noted: 2024-01-01

## 2024-06-01 PROBLEM — K85.10 GALLSTONE PANCREATITIS: Status: RESOLVED | Noted: 2024-01-01 | Resolved: 2024-01-01

## 2024-06-01 PROBLEM — D69.6 THROMBOCYTOPENIA (HCC): Status: ACTIVE | Noted: 2024-01-01

## 2024-06-01 PROBLEM — E83.110 HEREDITARY HEMOCHROMATOSIS (HCC): Status: ACTIVE | Noted: 2024-01-01

## 2024-06-02 NOTE — PROGRESS NOTES
Hospital Medicine Daily Progress Note    Date of Service  6/2/2024    Chief Complaint  Ryley Gaines is a 53 y.o. male admitted 6/1/2024 with upper quadrant abdominal pain    Hospital Course  Patient is a pleasant 53-year-old male who was a direct admission from [facility] for gallstone pancreatitis.  He has been having right upper quadrant pain for the last 5 days and has had multiple MRCP's in the past.  He states that he has been told that he has not had a stone in the common bile duct but stones are present in the gallbladder.  MRCP currently pending and will get the appropriate consultation thereafter whether it be general surgery for the addition of GI depending on if there is a CBD stone.  Patient has underlying hereditary hemochromatosis and is on rifaximin.      Interval Problem Update  6/2 patient seen this morning and complains of abdominal pain right upper quadrant however was feeling markedly better after pain medication.  No negative Hernandez's at time of my examination.  I think he has had festering cholecystitis and the question is whether or not he has a common bile duct stone with a bilirubin of 4.5.  Lipase is slightly elevated to 49.  Will continue with pain management and will get appropriate consultation after MRCP is resulted.    I have discussed this patient's plan of care and discharge plan at IDT rounds today with Case Management, Nursing, Nursing leadership, and other members of the IDT team.    Consultants/Specialty  none    Code Status  Full Code    Disposition  The patient is not medically cleared for discharge to home or a post-acute facility.  Anticipate discharge to: home with close outpatient follow-up    I have placed the appropriate orders for post-discharge needs.    Review of Systems  Review of Systems   Constitutional:  Negative for chills and fever.   HENT:  Negative for congestion.    Eyes:  Negative for blurred vision and photophobia.   Respiratory:  Negative for cough and  shortness of breath.    Cardiovascular:  Negative for chest pain, claudication and leg swelling.   Gastrointestinal:  Positive for abdominal pain. Negative for constipation, diarrhea, heartburn and vomiting.   Genitourinary:  Negative for dysuria and hematuria.   Musculoskeletal:  Negative for joint pain and myalgias.   Skin:  Negative for itching and rash.   Neurological:  Negative for dizziness, sensory change, speech change, weakness and headaches.   Psychiatric/Behavioral:  Negative for depression. The patient is not nervous/anxious and does not have insomnia.         Physical Exam  Temp:  [36.3 °C (97.4 °F)-36.8 °C (98.2 °F)] 36.3 °C (97.4 °F)  Pulse:  [56-68] 63  Resp:  [16-18] 18  BP: (119-126)/(69-72) 123/69  SpO2:  [94 %-98 %] 95 %    Physical Exam  Vitals and nursing note reviewed.   Constitutional:       General: He is not in acute distress.     Appearance: Normal appearance.   HENT:      Head: Normocephalic and atraumatic.   Eyes:      General: No scleral icterus.     Extraocular Movements: Extraocular movements intact.   Cardiovascular:      Rate and Rhythm: Normal rate and regular rhythm.      Pulses: Normal pulses.      Heart sounds: Normal heart sounds. No murmur heard.  Pulmonary:      Effort: Pulmonary effort is normal. No respiratory distress.      Breath sounds: Normal breath sounds. No wheezing, rhonchi or rales.   Abdominal:      General: Abdomen is flat. Bowel sounds are normal. There is no distension.      Palpations: Abdomen is soft.      Tenderness: There is abdominal tenderness. There is no guarding or rebound.      Comments: Negative Hernandez's at time of examination however patient had received pain medication   Musculoskeletal:         General: No swelling or tenderness.      Cervical back: Normal range of motion and neck supple.   Lymphadenopathy:      Cervical: No cervical adenopathy.   Skin:     Coloration: Skin is not jaundiced.      Findings: No erythema.   Neurological:       General: No focal deficit present.      Mental Status: He is alert and oriented to person, place, and time. Mental status is at baseline.      Cranial Nerves: No cranial nerve deficit.   Psychiatric:         Mood and Affect: Mood normal.         Behavior: Behavior normal.         Fluids  No intake or output data in the 24 hours ending 06/02/24 1433    Laboratory  Recent Labs     06/01/24  2303   WBC 7.5   RBC 3.67*   HEMOGLOBIN 12.2*   HEMATOCRIT 36.6*   MCV 99.7*   MCH 33.2*   MCHC 33.3   RDW 56.9*   PLATELETCT 85*   MPV 12.1     Recent Labs     06/01/24  2303   SODIUM 134*   POTASSIUM 4.1   CHLORIDE 101   CO2 22   GLUCOSE 85   BUN 20   CREATININE 0.80   CALCIUM 8.3*                   Imaging  [id number]    (Results Pending)        Assessment/Plan  * Acute gallstone pancreatitis- (present on admission)  Assessment & Plan  -Inpatient Status Medical Floor  -NPO with sips and meds  -Pain control with IV/PO Narcotics  -Initial Lipase at outside Facility was 5563  -Pending  MRCP  -T Bili 4.5,   -Triglycerides 98  Procalcitonin-0.43  Will discuss with GI and general surgery depending on the results of the MRCP.    Thrombocytopenia (HCC)  Assessment & Plan  Under the context of hemochromatosis. Platelets at outside facility 81. Monitor CBC      Hereditary hemochromatosis (HCC)  Assessment & Plan  -He takes rifaximin. Now NPO  -Baseline MELD 17    Encounter for tobacco use cessation counseling  Assessment & Plan  Patient smokes 5 cigarettes/ Day but did not smoke over the past 5 days since he is not feeling well. He desires to quit smoking and I encourage to consider to use these 5 days as being already quit. I offered Nicotine Patch and he does not feel needing at this time. I encourage him to continue efforts in quit smoking. Total amount time dedicated to tobacco smoking counseling was 5 min.          VTE prophylaxis:   SCDs/TEDs      I have performed a physical exam and reviewed and updated ROS and Plan today  (6/2/2024). In review of yesterday's note (6/1/2024), there are no changes except as documented above.

## 2024-06-02 NOTE — ASSESSMENT & PLAN NOTE
-Inpatient Status Medical Floor  Clear liquid diet, n.p.o. at midnight pending GI intervention  Discussed with general surgery, felt patient would be very high risk for any type of surgical intervention and if there was evidence of gallbladder distention cholecystotomy tube recommended  Ultrasound liver and TIPS, no occlusion in the catheter, still concern for acute cholecystitis    6/4: Patient to have ERCP today, we appreciate further recommendations by GI.  Concern for cholecystitis, will continue antibiotics, follow cultures.    6/5: Lipase level greater than 3000 today we have started the patient on IV fluids.  I appreciate further recommendations by GI.    6/6: Pain has improved, lipase level has improved significantly as well.  We have discontinued IV fluids.  We appreciate further recommendations by GI.    6/7: Patient not complaining of pain today, we have consulted General Surgery, we appreciate further recommendations.

## 2024-06-02 NOTE — CARE PLAN
The patient is Stable - Low risk of patient condition declining or worsening    Shift Goals  Clinical Goals: pt will decrease nausea, pain manage at tolerable level, npo diet  Patient Goals: pain management    Progress made toward(s) clinical / shift goals:  pt nausea/vomiting will decrease/improve no complaints of n/v at this time, pain reported 7/10 PRN dilaudid given per mar.    Patient is not progressing towards the following goals:

## 2024-06-02 NOTE — PROGRESS NOTES
1920- Received report from morning RN, regarding pt direct admit from [facility].      2007- Pt Admitted on the floor room 203 pt ambulated from [facility] to hospital bed No assistance needed. Pt is alert and oriented x4. No complaints at this time, reviewed ordered and med-rec, admission profile done. POC discussed with pt, verbalized understanding. Pt on strict npo. Notified MD that pt is already here. Place a call light within reach. Hourly rounding in place.

## 2024-06-02 NOTE — ASSESSMENT & PLAN NOTE
"As per previous hospitalist:  \"Patient smokes 5 cigarettes/ Day but did not smoke over the past 5 days since he is not feeling well. He desires to quit smoking and I encourage to consider to use these 5 days as being already quit. I offered Nicotine Patch and he does not feel needing at this time. I encourage him to continue efforts in quit smoking. Total amount time dedicated to tobacco smoking counseling was 5 min.\"  "

## 2024-06-02 NOTE — PROGRESS NOTES
4 Eyes Skin Assessment Completed by SAMMY García and SAMMY Cutler.    Head WDL  Ears WDL  Nose WDL  Mouth WDL  Neck WDL  Breast/Chest WDL  Shoulder Blades WDL  Spine WDL  (R) Arm/Elbow/Hand Bruising  (L) Arm/Elbow/Hand Bruising  Abdomen WDL  Groin WDL  Scrotum/Coccyx/Buttocks WDL  (R) Leg Bruising  (L) Leg Scab and Bruising  (R) Heel/Foot/Toe WDL  (L) Heel/Foot/Toe WDL          Devices In Places       Interventions In Place Pillows    Possible Skin Injury No    Pictures Uploaded Into Epic N/A  Wound Consult Placed N/A  RN Wound Prevention Protocol Ordered No

## 2024-06-02 NOTE — PROGRESS NOTES
[facility] TRIAGE OFFICER CONSULT REQUEST REPORT  Consult requested by: Dr Ken  Reason for consult: Gallstone panreatitis  Is consult for transition of care: Yes  Pertinent history/hospital Course: Patient has history of hemochromatosis and cirrhosis with a MELD score of 17 currently with acute gallstone pancreatitis.  Patient will need ERCP and GI consultation and thus the reason for transfer  Code Status: Full code  Can the hospitalist sign off upon completion of required consult/outcomes requested: No   Assigned hospitalist: Call hospitalist on arrival    For any question or concerns regarding the care of this patient, please reach out to the assigned hospitalist.

## 2024-06-02 NOTE — PROGRESS NOTES
Bedside report received from SAMMY García. Assumed care of patient. Daily plan of care discussed. Pt resting comfortably in bed with no signs of distress noted. Breathing even and unlabored. Patient waiting on MRCP. Patient reports pain level 2/10, no needs at this time. Further needs at this time. Call light within reach. Bed locked in lowest position. Plan of care on going.

## 2024-06-02 NOTE — HOSPITAL COURSE
Patient is a pleasant 53-year-old male who was a direct admission from [facility] for gallstone pancreatitis.  He has been having right upper quadrant pain for the last 5 days and has had multiple MRCP's in the past.  He states that he has been told that he has not had a stone in the common bile duct but stones are present in the gallbladder.  MRCP currently pending and will get the appropriate consultation thereafter whether it be general surgery for the addition of GI depending on if there is a CBD stone.  Patient has underlying hereditary hemochromatosis and is on rifaximin.

## 2024-06-02 NOTE — H&P
Hospital Medicine History & Physical Note    Date of Service  6/1/2024    Primary Care Physician  No primary care provider on file.    Consultants  None    Code Status  Full Code    Chief Complaint  Direct Admission from [facility] for Gallstone Pancreatitis    History of Presenting Illness  Ryley Gaines is a 53 y.o. male, with h/o HTN, Hereditary Hemochromatosis on rifaximin (MELD 17), who is coming as a Direct Admission from [facility] for Gallstone Pancreatitis on 6/1/2024.    Patient reports having epigastric and RUQ pain for 5 days, much worse over the past 3 days to the point he is having trouble to sleep. Eating makes the pain worse, so he reports poor oral intake over the last 48 hours ate only a few bites of a banana.     Prior h/o gallstone requiring ERCP.    Given prior transfer:  -NS 1.5 Liter Bolus  -Dilaudid 1 mg IV    I discussed the plan of care with patient.    Review of Systems  Review of Systems   Constitutional:  Positive for malaise/fatigue. Negative for fever.   HENT:  Negative for congestion and sore throat.    Eyes:  Negative for blurred vision and double vision.   Respiratory:  Negative for cough and shortness of breath.    Cardiovascular:  Negative for chest pain and palpitations.   Gastrointestinal:  Positive for abdominal pain and nausea. Negative for vomiting.   Genitourinary:  Negative for dysuria and urgency.   Musculoskeletal:  Negative for myalgias and neck pain.   Skin:  Negative for itching and rash.   Neurological:  Negative for dizziness, weakness and headaches.   Endo/Heme/Allergies:  Does not bruise/bleed easily.   Psychiatric/Behavioral:  Negative for depression. The patient does not have insomnia.        Past Medical History  Hereditary hemochromatosis, HTN    Surgical History  Denies recent surgical History    Family History  Reviewed and not pertinent  Family history reviewed with patient. There is no family history that is pertinent to the chief complaint.     Social  History   Smokes about 5 cigarettes/ day. Denies daily ETOH drinking and denies recreational drug use    Allergies  No Known Allergies    Medications  Prior to Admission Medications   Prescriptions Last Dose Informant Patient Reported? Taking?   ciprofloxacin (CIPRO) 500 MG Tab 5/28/2024 at 2000  Yes Yes   Sig: Take 500 mg by mouth 1 time a day as needed.   furosemide (LASIX) 40 MG Tab 5/28/2024 at 1200  Yes Yes   Sig: Take 40 mg by mouth 1 time a day as needed.   gabapentin (NEURONTIN) 300 MG Cap 5/28/2024 at 2000  Yes Yes   Sig: Take 300 mg by mouth 3 times a day.   lisinopril (PRINIVIL) 10 MG Tab 5/28/2024 at 1200  Yes Yes   Sig: Take 10 mg by mouth every day.   multivitamin Tab 5/28/2024 at 1200  Yes Yes   Sig: Take 1 Tablet by mouth every day.   omeprazole (PRILOSEC) 40 MG delayed-release capsule 5/28/2024 at 0800  Yes Yes   Sig: Take 40 mg by mouth every day.   ondansetron (ZOFRAN) 4 MG/2ML Solution injection 5/31/2024 at 10AM  Yes Yes   Sig: Infuse 4 mg into a venous catheter every four hours as needed for Nausea.   riFAXIMin (XIFAXAN) 550 MG Tab tablet   Yes Yes   Sig: Take 500 mg by mouth every day.   traZODone (DESYREL) 50 MG Tab 5/28/2024 at 2000  Yes Yes   Sig: Take 50 mg by mouth every evening.      Facility-Administered Medications: None       Physical Exam  Temp:  [36.5 °C (97.7 °F)] 36.5 °C (97.7 °F)  Pulse:  [68] 68  Resp:  [16] 16  BP: (126)/(72) 126/72  SpO2:  [98 %] 98 %  Blood Pressure: 126/72   Temperature: 36.5 °C (97.7 °F)   Pulse: 68   Respiration: 16   Pulse Oximetry: 98 %       Physical Exam  Constitutional:       Appearance: Normal appearance.   HENT:      Head: Normocephalic and atraumatic.      Nose: Nose normal.      Mouth/Throat:      Mouth: Mucous membranes are moist.      Pharynx: Oropharynx is clear.   Eyes:      Extraocular Movements: Extraocular movements intact.      Pupils: Pupils are equal, round, and reactive to light.   Cardiovascular:      Rate and Rhythm: Normal rate and  regular rhythm.      Pulses: Normal pulses.      Heart sounds: Normal heart sounds.   Pulmonary:      Effort: Pulmonary effort is normal.      Breath sounds: Normal breath sounds.   Abdominal:      General: Abdomen is flat. Bowel sounds are normal. There is distension (Minimal).      Palpations: Abdomen is soft.      Tenderness: There is abdominal tenderness (Mild epigastric pain to palpation).   Musculoskeletal:      Cervical back: Normal range of motion and neck supple.   Skin:     General: Skin is warm and dry.   Neurological:      General: No focal deficit present.      Mental Status: He is alert and oriented to person, place, and time.   Psychiatric:         Mood and Affect: Mood normal.         Behavior: Behavior normal.         Laboratory:        Outside Facility Laboratory:  CBC: WBC 9.1, Hgb 13.0, Platelets 81  PT; 14.4, INR 1.4, PTT 29.3  CMP: Na 136, K 3.9, Chloride 104, CO2 20, Glucose 105, Creatinine 1.0, Calcium 10, Alk Phos 150, T Bili 4.8, ALT 25, AST 45  Lipase 5563  Triglycerides 98    Imaging:  Outside Facility Imaging:    CT Abdomen/ Pelvis  W:  IMPRESSION:  Edema adjacent to the pancreatic head consistent with acute pancreatitis. Correlate with appropriate laboratory values.  Gallstones in contracted gallbladder with likely gallbladder wall thickening. Acute Cholecystitis not excluded.   Cirrhosis  Splenomegaly.  Wall thickening of the 2nd portion of the duodenum likely due to duodenitis, pancreatitis or cholecystitis  Mild thickening left mild abdominal small bowel loops likely infectious or inflammatory        I provided extensive chart review of outside facility medical records including laboratory, imagine report and additional related pertinent medical information as described above.    Assessment/Plan:  Justification for Admission Status  I anticipate this patient will require at least two midnights for appropriate medical management, necessitating inpatient admission because Direct  Admission from [facility] for Gallstone Pancreatitis        * Acute gallstone pancreatitis- (present on admission)  Assessment & Plan  -Inpatient Status Medical Floor  -NPO  -IVF: NS at 125 ml/h  -Pain control with IV Narcotics  -Initial Lipase at outside Facility was 5563  -Ordered MRCP  -T Bili 4.8, ALT 25, AST 45. Repeat CMP in am  -Triglycerides 98  -Added procalcitonin and lactic acid. CT done at outside facility showed possible cholecystitis, but no fever, no Leucocytosis. Consider star antibiotics if procalcitonin positive    Hereditary hemochromatosis (HCC)  Assessment & Plan  -He takes rifaximin. Now NPO  -Baseline MELD 17    Thrombocytopenia (HCC)  Assessment & Plan  Under the context of hemochromatosis. Platelets at outside facility 81. Monitor CBC      Encounter for tobacco use cessation counseling  Assessment & Plan  Patient smokes 5 cigarettes/ Day but did not smoke over the past 5 days since he is not feeling well. He desires to quit smoking and I encourage to consider to use these 5 days as being already quit. I offered Nicotine Patch and he does not feel needing at this time. I encourage him to continue efforts in quit smoking. Total amount time dedicated to tobacco smoking counseling was 5 min.         VTE prophylaxis: SCDs/TEDs

## 2024-06-03 PROBLEM — R17 JAUNDICE: Status: ACTIVE | Noted: 2024-01-01

## 2024-06-03 NOTE — CARE PLAN
The patient is Stable - Low risk of patient condition declining or worsening    Shift Goals  Clinical Goals: Pain management, NPO after midnight, Comfort  Patient Goals: Pain control, Comfort, Rest    Progress made toward(s) clinical / shift goals:  Patient's pain has been adequately controlled with prescribed medication. He uses the call light appropriately to ask for assistance. He has remained free from falls.    Patient is not progressing towards the following goals:

## 2024-06-03 NOTE — ASSESSMENT & PLAN NOTE
Since current bilirubin 4, states he usually runs around 2 at his baseline  No evidence of choledocholithiasis on MRCP however study was very poor, cannot completely be excluded  Appreciate GI consultation    6/4: Gi following and patient to undergo ERCP today.

## 2024-06-03 NOTE — PROGRESS NOTES
Hospital Medicine Daily Progress Note    Date of Service  6/3/2024    Chief Complaint  Ryley Gaines is a 53 y.o. male admitted 6/1/2024 with upper quadrant abdominal pain    Hospital Course  Patient is a pleasant 53-year-old male who was a direct admission from [facility] for gallstone pancreatitis.  He has been having right upper quadrant pain for the last 5 days and has had multiple MRCP's in the past.  He states that he has been told that he has not had a stone in the common bile duct but stones are present in the gallbladder.  MRCP currently pending and will get the appropriate consultation thereafter whether it be general surgery for the addition of GI depending on if there is a CBD stone.  Patient has underlying hereditary hemochromatosis and is on rifaximin.      Interval Problem Update  6/2 patient seen this morning and complains of abdominal pain right upper quadrant however was feeling markedly better after pain medication.  No negative Hernandez's at time of my examination.  I think he has had festering cholecystitis and the question is whether or not he has a common bile duct stone with a bilirubin of 4.5.  Lipase is slightly elevated to 49.  Will continue with pain management and will get appropriate consultation after MRCP is resulted.  6/3 patient states he feels the same today as he did yesterday.  The ultrasound was somewhat uncomfortable.  He is still receiving pain medication and is doing better today than compared to yesterday as far as pain control.  TIPS is not obstructed on ultrasound.  Gastroenterology to consult and recommendations forthcoming.    I have discussed this patient's plan of care and discharge plan at IDT rounds today with Case Management, Nursing, Nursing leadership, and other members of the IDT team.    Consultants/Specialty  none    Code Status  Full Code    Disposition  The patient is not medically cleared for discharge to home or a post-acute facility.  Anticipate discharge  to: home with close outpatient follow-up    I have placed the appropriate orders for post-discharge needs.    Review of Systems  Review of Systems   Constitutional:  Negative for chills and fever.   HENT:  Negative for congestion.    Eyes:  Negative for blurred vision and photophobia.   Respiratory:  Negative for cough and shortness of breath.    Cardiovascular:  Negative for chest pain, claudication and leg swelling.   Gastrointestinal:  Positive for abdominal pain. Negative for constipation, diarrhea, heartburn, nausea and vomiting.   Genitourinary:  Negative for dysuria and hematuria.   Musculoskeletal:  Negative for joint pain and myalgias.   Skin:  Negative for itching and rash.   Neurological:  Negative for dizziness, sensory change, speech change, weakness and headaches.   Psychiatric/Behavioral:  Negative for depression. The patient is not nervous/anxious and does not have insomnia.         Physical Exam  Temp:  [36.1 °C (96.9 °F)-36.7 °C (98.1 °F)] 36.7 °C (98.1 °F)  Pulse:  [60-96] 91  Resp:  [16-17] 17  BP: ()/(58-88) 94/58  SpO2:  [92 %-96 %] 93 %    Physical Exam  Vitals and nursing note reviewed.   Constitutional:       General: He is not in acute distress.     Appearance: Normal appearance.   HENT:      Head: Normocephalic and atraumatic.   Eyes:      General: No scleral icterus.     Extraocular Movements: Extraocular movements intact.   Cardiovascular:      Rate and Rhythm: Normal rate and regular rhythm.      Pulses: Normal pulses.      Heart sounds: Normal heart sounds. No murmur heard.  Pulmonary:      Effort: Pulmonary effort is normal. No respiratory distress.      Breath sounds: Normal breath sounds. No wheezing, rhonchi or rales.   Abdominal:      General: Abdomen is flat. Bowel sounds are normal. There is no distension.      Palpations: Abdomen is soft.      Tenderness: There is abdominal tenderness (Right upper quadrant). There is no guarding or rebound.   Musculoskeletal:          General: No swelling or tenderness.      Cervical back: Normal range of motion and neck supple.   Lymphadenopathy:      Cervical: No cervical adenopathy.   Skin:     Coloration: Skin is not jaundiced.      Findings: No erythema.   Neurological:      General: No focal deficit present.      Mental Status: He is alert and oriented to person, place, and time. Mental status is at baseline.      Cranial Nerves: No cranial nerve deficit.   Psychiatric:         Mood and Affect: Mood normal.         Behavior: Behavior normal.         Fluids    Intake/Output Summary (Last 24 hours) at 6/3/2024 1312  Last data filed at 6/3/2024 0850  Gross per 24 hour   Intake 460 ml   Output --   Net 460 ml       Laboratory  Recent Labs     06/01/24  2303 06/03/24  0140   WBC 7.5 8.2   RBC 3.67* 3.70*   HEMOGLOBIN 12.2* 12.3*   HEMATOCRIT 36.6* 37.0*   MCV 99.7* 100.0*   MCH 33.2* 33.2*   MCHC 33.3 33.2   RDW 56.9* 56.7*   PLATELETCT 85* 121*   MPV 12.1 12.2     Recent Labs     06/01/24 2303 06/03/24  0140   SODIUM 134* 133*   POTASSIUM 4.1 4.2   CHLORIDE 101 99   CO2 22 22   GLUCOSE 85 90   BUN 20 22   CREATININE 0.80 0.92   CALCIUM 8.3* 8.2*                   Imaging  US-LIVER AND VESSELS LTD (TIPS) (COMBO)   Final Result         1. Cirrhotic appearance of the liver.   2. Cholelithiasis. There is wall thickening of the gallbladder which may be seen with acute cholecystitis or could be related to the hepatic dysfunction.   3. Ascites.   4. The TIPS is not occluded.   5. Limited exam.      [id number]-H/O   Final Result      1.  Study is substantially degraded by motion artifacts.   2.  No intra or extrahepatic bile duct dilation. CBD diameter is 5 mm. The MRCP imaging is otherwise degraded and cannot accurately exclude choledocholithiasis although none is clearly visible.   3.  Gallbladder wall thickening and edema. Gallstones are present, better seen on comparison prior CT study. There is possible cholecystitis. This could be definitively  confirmed with HIDA scan if indicated.   4.  2.5 x 1.4 cm lesion in pancreatic head. This could be phlegmon or early pseudocyst. Recommend follow-up imaging to assess resolution and exclude a pancreas mass.   5.  Fat stranding around the pancreas head and region, recommend laboratory correlation for pancreatitis.   6.  2.6 cm irregular spiculated nodular lesion along the inferior aspect of gastric body. This has some calcifications within it as seen on the previous CT study. The lesion is indeterminate. Recommend PET CT workup.   7.  Wall thickening of the proximal duodenum which could be due to duodenitis or cholecystitis.   8.  Cirrhosis, splenomegaly. TIPS in place.   9.  Mild to moderate ascites.           Assessment/Plan  * Acute gallstone pancreatitis- (present on admission)  Assessment & Plan  -Inpatient Status Medical Floor  Clear liquid diet, n.p.o. at midnight pending GI intervention  Discussed with general surgery, felt patient would be very high risk for any type of surgical intervention and if there was evidence of gallbladder distention cholecystotomy tube recommended  Ultrasound liver and TIPS, no occlusion in the catheter, still concern for acute cholecystitis    Jaundice  Assessment & Plan  Since current bilirubin 4, states he usually runs around 2 at his baseline  No evidence of choledocholithiasis on MRCP however study was very poor, cannot completely be excluded  Appreciate GI consultation    Thrombocytopenia (HCC)  Assessment & Plan  Platelets up to 121 today      Hereditary hemochromatosis (HCC)  Assessment & Plan  -He takes rifaximin  -Baseline MELD 17    Encounter for tobacco use cessation counseling  Assessment & Plan  Patient smokes 5 cigarettes/ Day but did not smoke over the past 5 days since he is not feeling well. He desires to quit smoking and I encourage to consider to use these 5 days as being already quit. I offered Nicotine Patch and he does not feel needing at this time. I  encourage him to continue efforts in quit smoking. Total amount time dedicated to tobacco smoking counseling was 5 min.          VTE prophylaxis:   SCDs/TEDs      I have performed a physical exam and reviewed and updated ROS and Plan today (6/3/2024). In review of yesterday's note (6/2/2024), there are no changes except as documented above.

## 2024-06-03 NOTE — CONSULTS
"                                             Gastroenterology Consult Note     Date of Consult: 06/03/2024  Referring Physician: Zina     Reason for consult: elevated LFTs, possible gallstone pancreatitis      HPI: This is a 52 yo male with history of cirrhosis reportedly secondary to iron overload and h/o TIPs placement who was transferred to [place] from [facility] because of concern for gallstone pancreatitis. The patient says that he first developed pain 2 weeks ago. He says the pain was severe and mainly located in the epigastrium. The pain radiated throughout the abdomen and then through to his back. He went to the hospital at that time and was transferred to [facility]. He says that he was inpatient there for 5 days but ultimately left because \"they weren't doing anything.\" He was found to have gallstone pancreatitis at that time, surgery was consulted for cholecystectomy but this was not performed likely due to concerns about cirrhosis. MRCP was performed at that time and no CBD dilation or stones were seen.    When he returned home, he says that he was still having pain. He says that even drinking water would cause pain. The pain became severe again last week and he decided to go back to the hospital. He again was found to have elevated LFTs and CT showed pancreatic inflammation. Again due to concern for gallstone pancreatitis, the patient was transferred here. He says that his pain has been well controlled since his arrival. He was nauseated and was vomiting but this has improved. He is still getting some pain with a clear liquid diet. He says that he has been jaundice for the last 2 weeks. He reports lower extremity swelling and swelling in his abdomen, lower back and buttocks.    He reports that he was originally diagnosed with cirrhosis 6 years ago. He underwent hernia surgery and at that time, something happened requiring a liver workup. He was told he had hemochromatosis and " he required a few sessions of phlebotomy to remove excess iron. He later got information about his family history. He says he is adopted but he did get in touch with his biological parents and they reported excess iron overload as well. He was given a diagnosis of hereditary hemochromatosis. During this time, he was drinking ETOH regularly, nearly every day. He says that it would take him 2-3 days to drink a fifth. He says that he developed severe ascites requiring 3 paracenteses. After the 3rd was performed, a TIPs was placed to help control his fluid balance. He was not referred to a liver transplant program and has not followed up with a GI/Liver specialist.      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     SocHx: He still smokes 5 cigarettes per day and smoke MJ as well. He stopped drinking 6 years ago  Social History     Socioeconomic History    Marital status:      Spouse name: Not on file    Number of children: Not on file    Years of education: Not on file    Highest education level: Not on file   Occupational History    Not on file   Tobacco Use    Smoking status: Not on file    Smokeless tobacco: Not on file   Substance and Sexual Activity    Alcohol use: Not on file    Drug use: Not on file    Sexual activity: Not on file   Other Topics Concern    Not on file   Social History Narrative    Not on file     Social Determinants of Health     Financial Resource Strain: Not on file   Food Insecurity: No Food Insecurity (6/1/2024)    Hunger Vital Sign     Worried About Running Out of Food in the Last Year: Never true     Ran Out of Food in the Last Year: Never true   Transportation Needs: No Transportation Needs (6/1/2024)    PRAPARE - Transportation     Lack of Transportation (Medical): No     Lack of  Transportation (Non-Medical): No   Physical Activity: Not on file   Stress: Not on file   Social Connections: Not on file   Intimate Partner Violence: Not At Risk (6/1/2024)    Humiliation, Afraid, Rape, and Kick questionnaire     Fear of Current or Ex-Partner: No     Emotionally Abused: No     Physically Abused: No     Sexually Abused: No   Housing Stability: Low Risk  (6/1/2024)    Housing Stability Vital Sign     Unable to Pay for Housing in the Last Year: No     Number of Places Lived in the Last Year: 1     Unstable Housing in the Last Year: No        FAMHx: patient is adopted but was able to get information that his biologic parents had iron issues     ROS:  Constitutional: No fevers, chills, no night sweats, no weight changes  HEENT: no vision or hearing changes, no dry mouth, no change in smell  CARDIO: no palpitations, no orthopnea, no chest pain  PULM: no cough, no shortness of breath  NEURO: no Seizures, no memory impairment, no change in sensation  GI: as above  : no dysuria, no hematuria  HEME: no anemia, no easy brusing  MUSCULOSKELETAL: no muscle aches, + back pain, no arthritis  PSYCH: no anxiety or depression  SKIN: no rashes     PE:  Vitals:    06/02/24 2142 06/03/24 0600 06/03/24 0845 06/03/24 1100   BP: (!) 141/75 111/75 111/65 94/58   Pulse: 85 96 80 91   Resp: 16 16 16 17   Temp: 36.4 °C (97.6 °F) 36.7 °C (98.1 °F)  36.7 °C (98.1 °F)   TempSrc: Oral Oral  Oral   SpO2: 93% 92%  93%   Weight:       Height:         Gen: AAOx3, NAD, lying in bed  HEENT: Icteric sclera, nares patent, Mucous membranes moist  Neck: supple, no cervical or supraclavicular adenopathy  CVS: regular rhythm, normal rate, no MRG  Pulm: CTAB, no crackles  Abd: soft, distended and mildly TTP in the epigastrium, no guarding or rebound, diminished bowel sounds  Ext: no edema, normal sensation  NEURO: grossly normal, no weakness  Skin: warm, no rash, +jaundice and bruises present  Psych: normal Affect, no anxiety     LABS:  Lab  "Results   Component Value Date/Time    SODIUM 133 (L) 06/03/2024 01:40 AM    POTASSIUM 4.2 06/03/2024 01:40 AM    CHLORIDE 99 06/03/2024 01:40 AM    CO2 22 06/03/2024 01:40 AM    GLUCOSE 90 06/03/2024 01:40 AM    BUN 22 06/03/2024 01:40 AM    CREATININE 0.92 06/03/2024 01:40 AM      Lab Results   Component Value Date/Time    WBC 8.2 06/03/2024 01:40 AM    RBC 3.70 (L) 06/03/2024 01:40 AM    HEMOGLOBIN 12.3 (L) 06/03/2024 01:40 AM    HEMATOCRIT 37.0 (L) 06/03/2024 01:40 AM    .0 (H) 06/03/2024 01:40 AM    MCH 33.2 (H) 06/03/2024 01:40 AM    MCHC 33.2 06/03/2024 01:40 AM    MPV 12.2 06/03/2024 01:40 AM        No results found for: \"PROTHROMBTM\", \"INR\"   Recent Labs     06/01/24  2303 06/03/24  0140   ASTSGOT 42 52*   ALTSGPT 21 23   TBILIRUBIN 4.5* 4.0*   GLOBULIN 2.9 3.0     IMAGING:  MRCP - IMPRESSION:     1.  Study is substantially degraded by motion artifacts.  2.  No intra or extrahepatic bile duct dilation. CBD diameter is 5 mm. The MRCP imaging is otherwise degraded and cannot accurately exclude choledocholithiasis although none is clearly visible.  3.  Gallbladder wall thickening and edema. Gallstones are present, better seen on comparison prior CT study. There is possible cholecystitis. This could be definitively confirmed with HIDA scan if indicated.  4.  2.5 x 1.4 cm lesion in pancreatic head. This could be phlegmon or early pseudocyst. Recommend follow-up imaging to assess resolution and exclude a pancreas mass.  5.  Fat stranding around the pancreas head and region, recommend laboratory correlation for pancreatitis.  6.  2.6 cm irregular spiculated nodular lesion along the inferior aspect of gastric body. This has some calcifications within it as seen on the previous CT study. The lesion is indeterminate. Recommend PET CT workup.  7.  Wall thickening of the proximal duodenum which could be due to duodenitis or cholecystitis.  8.  Cirrhosis, splenomegaly. TIPS in place.  9.  Mild to moderate " ascites.    US liver and TIPS -  IMPRESSION:        1. Cirrhotic appearance of the liver.  2. Cholelithiasis. There is wall thickening of the gallbladder which may be seen with acute cholecystitis or could be related to the hepatic dysfunction.  3. Ascites.  4. The TIPS is not occluded.     Problem List Items Addressed This Visit    None       ASSESSMENT: This is a 54 yo male with cirrhosis who was transferred with concern for gallstone pancreatitis. He had an elevated lipase over 5000 and CT evidence of pancreatitis at the outside hospital. He was also noted to have cholelithiasis. He was told he needed an ERCP and possibly cholecystectomy. Currently, his MELD score is 19. He would be at moderately high risk for surgical complications if cholecystectomy was considered. Because of this, I do not believe he is a suitable candidate for cholecystectomy. There is concern for the possibility of gallstones as the cause of pancreatitis. His CBD is not dilated but with this concern a reasonable approach would be to consider ERCP with sphincterotomy to help prevent stones getting stuck at the major papilla. The patient is upset that he won't be able to get the gallbladder removed but is willing to go ahead with ERCP for sphincterotomy     PLAN:   1) monitor and treat pain  2) continue Clear liquid diet  3) NPO at midnight  4) MRCP here was very poor quality. I will therefore perform an EGD/EUS prior to ERCP tomorrow to further evaluate the CBD and pancreas.  5) ERCP tomorrow with sphincterotomy   6) monitor LFTs and check AM INR  7) no need to monitor daily lipase levels  8) recommend checking iron studies. I do not believe this patient has true homozygous recessive C282Y mutation causing hemochromatosis. He likely is only a carrier and iron overload in the past was likely secondary to ETOH. He does not get phlebotomy regularly so if truly hereditary hemochromatosis, iron levels should be very high  9) outpatient referral to  liver transplant program so that the patient can have ongoing care and possibly listing for transplant at some point      Thank you for this consult.     Ryley Ortiz MD

## 2024-06-03 NOTE — CARE PLAN
The patient is Stable - Low risk of patient condition declining or worsening    Shift Goals  Clinical Goals: Pain management, monitor ascites  Patient Goals: pain control    Progress made toward(s) clinical / shift goals:  Patient reports having increase ascites to BLE and abdomen- MD aware. Pt having an EGD tomorrow morning.      Patient is not progressing towards the following goals:

## 2024-06-03 NOTE — PROGRESS NOTES
Assumed care, patient awake and alert. Stated he felt abdominal pain and asked when his next pain medication would be available. Reviewed plan of care for the night.

## 2024-06-03 NOTE — PROGRESS NOTES
Bedside report received from SAMMY Guy. Assumed care of patient. Daily plan of care discussed. Pt resting comfortably in bed with no signs of distress noted. Breathing even and unlabored. Patient waiting on GI consult - NPO sips with meds at this time. Patient reports no further needs. Call light within reach. Bed locked in lowest position. Plan of care on going.

## 2024-06-03 NOTE — DISCHARGE PLANNING
Met with pt who reports that he lives with wife in [facility]. Pt said he is independent with ADLs and IADLs.   Wife will pick him up upon discharge.     PCP is Dr. Octavia Mckeon.   Pharmacy is [facility] in [place].    Addendum: Discussed with MD. Pt said that pt's Lipase has increased. Pt was placed on clear liquid today and diet adjustment on going. Pt no longer drinks ETOH so can possibly be a candidate for a transplant.     Care Transition Team Assessment    Information Source  Orientation Level: Oriented X4  Information Given By: Patient  Who is responsible for making decisions for patient? : Patient    Readmission Evaluation  Is this a readmission?: No    Elopement Risk  Legal Hold: No  Ambulatory or Self Mobile in Wheelchair: Yes  Disoriented: No  Psychiatric Symptoms: None  History of Wandering: No  Elopement this Admit: No  Vocalizing Wanting to Leave: No  Displays Behaviors, Body Language Wanting to Leave: No-Not at Risk for Elopement  Elopement Risk: Not at Risk for Elopement    Interdisciplinary Discharge Planning  Does Admitting Nurse Feel This Could be a Complex Discharge?: No  Primary Care Physician: Dr Octavia Mckeon  Lives with - Patient's Self Care Capacity: Spouse  Patient or legal guardian wants to designate a caregiver: No  Caregiver name: Emily Gaines  Caregiver contact info: [phone]  [place] [place] for Release of Health Information has been completed: Yes  Support Systems: Family Member(s)  Housing / Facility: 1 Story House  Able to Return to Previous ADL's: Yes  Mobility Issues: No  Prior Services: Home-Independent  Patient Prefers to be Discharged to:: Home  Assistance Needed: No  Durable Medical Equipment: Not Applicable    Discharge Preparedness  What is your plan after discharge?: Home with help  What are your discharge supports?: Spouse  Prior Functional Level: Ambulatory, Drives Self, Independent with Activities of Daily Living, Independent with Medication Management  Difficulity  with ADLs: None  Difficulity with IADLs: None    Functional Assesment  Prior Functional Level: Ambulatory, Drives Self, Independent with Activities of Daily Living, Independent with Medication Management    Finances  Financial Barriers to Discharge: No  Prescription Coverage: Yes    Vision / Hearing Impairment  Vision Impairment : Yes  Hearing Impairment : No    Values / Beliefs / Concerns  Values / Beliefs Concerns : No    Advance Directive  Advance Directive?: None    Domestic Abuse  Possible Abuse/Neglect Reported to:: Not Applicable         Discharge Risks or Barriers  Discharge risks or barriers?: No  Patient risk factors: Vulnerable adult    Anticipated Discharge Information  Discharge Disposition: Discharged to home/self care (01)

## 2024-06-04 PROBLEM — K74.60 CIRRHOSIS (HCC): Status: ACTIVE | Noted: 2024-01-01

## 2024-06-04 PROBLEM — E87.1 HYPONATREMIA: Status: ACTIVE | Noted: 2024-01-01

## 2024-06-04 PROBLEM — N17.9 AKI (ACUTE KIDNEY INJURY) (HCC): Status: ACTIVE | Noted: 2024-01-01

## 2024-06-04 NOTE — ANESTHESIA PROCEDURE NOTES
Airway    Date/Time: 6/4/2024 11:21 AM    Performed by: Ernestina Vera M.D.  Authorized by: Ernestina Vera M.D.    Location:  OR  Urgency:  Elective  Difficult Airway: No    Indications for Airway Management:  Anesthesia      Spontaneous Ventilation: absent    Sedation Level:  Deep  Preoxygenated: Yes    Patient Position:  Sniffing  Mask Difficulty Assessment:  1 - vent by mask  Final Airway Type:  Endotracheal airway  Final Endotracheal Airway:  ETT  Cuffed: Yes    Technique Used for Successful ETT Placement:  Direct laryngoscopy    Insertion Site:  Oral  Blade Type:  Jamie  Laryngoscope Blade/Videolaryngoscope Blade Size:  3  ETT Size (mm):  7.5  Measured from:  Teeth  ETT to Teeth (cm):  21  Placement Verified by: auscultation and capnometry    Cormack-Lehane Classification:  Grade I - full view of glottis  Number of Attempts at Approach:  1  Number of Other Approaches Attempted:  0

## 2024-06-04 NOTE — PROGRESS NOTES
Assumed care, patient awake and alert watching TV. Stated pain was moderate and under control and said he would call if he needed any medication. Reviewed plan of care for the night and NPO status at midnight for a procedure tomorrow. Patient verbalized understanding of care.

## 2024-06-04 NOTE — OR NURSING
1212 - Report received from Rossy CHRISTIANSON.      1215 - Pt resting comfortably, reporting pain 3/10 - chronic/tolerable, no nausea, VSS    1220 - Report to Chacorta CHRISTIANSON

## 2024-06-04 NOTE — OP REPORT
DATE OF SERVICE:  06/04/2024     INDICATION FOR PROCEDURE:  Patient with cirrhosis and recurrent pancreatitis with elevated LFTs. Known cholelithiasis and concern for gallstone pancreatitis. Due to high MELD score, patient is not a candidate for cholecystectomy. Recommendations were made to perform ERCP with sphincterotomy to help prevent recurrent pancreatitis.     PROCEDURE PERFORMED: EGD/EUS, ERCP with sphincterotomy     CONSENT:  Informed consent was obtained directly from the patient after benefits, risks and possible alternatives were discussed.     MEDICATIONS:  The patient received general anesthesia for this procedure. Please see the anesthesia record for details     EUS PROCEDURE DESCRIPTION:  The patient was placed in the prone position after sedation and intubation by the anesthesiologist. Vital signs were monitored continuously throughout the procedure.  When ready, the upper endoscope was placed in the patient's mouth and advanced easily and carefully to the esophagus and subsequently to the stomach and duodenum. The scope was then slowly withdrawn and mucosa examined. Retroflexion was performed in the stomach. The EGD scope was removed.    The EUS scope was then selected. The scope was placed in the mouth and advanced with semi-direct visualization through the oropharynx to the esophagus, stomach and duodenum. Ultrasound images are noted below. The patients toleration of this procedure was excellent.     FINDINGS:    Esophagus: normal    Stomach: normal    Duodenum: normal    EUS: The linear scope was selected. The gastric wall appeared normal. The liver appeared heterogeneous. There was a multicystic lesion eminating from the liver measuring 21 x 20mm. This appeared c/w serous cystadenoma or small pancreatic pseudocyst. There was no intrahepatic ductal dilation. There was a moderate amount of free fluid in the abdomen c/w ascites. The aorta, celiac artery and SMA appeared normal. The pancreas  appeared heterogeneous with hyperechoic lines and lobularity. The PD measured 1.5mm. The scope was advanced to the duodenal bulb. The duodenal wall appeared normal. The CBD measured 6.8mm and was not dilated. No stones were seen in the CBD The pancreatic head was evaluated and appeared mildly hypoechoic. There was no evidence of mass in the pancreatic head. The gallbladder was visualized and revealed a thickened GB wall and 2 large stones in the lumen.     ERCP: After completion of the EUS, the ERCP was performed. A side-viewing duodenoscope was passed carefully and easily under semi-direct visualization into the esophagus and passed through the stomach to the duodenum. Upon reaching the second portion of the duodenum, the scope was withdrawn to the short-position.The ampulla was identified. The ampulla appeared normal.    Using a R-Squared Jagtome Revolution preloaded with a 0.025 wire the CBD was cannulated. The wire was advanced under fluoroscopy into the duct and the tome was advanced over the wire. Bile was aspirated and then contrast was injected to obtain a cholangiogram. The cholangiogram was entirely interpreted by myself during the exam. The cholangiogram showed a non-dilated CBD measuring 6mm. There was no evidence of filling defect present. Due to the presence of cholelithiasis and concern for gallstone pancreatitis, a sphincterotomy was performed. The sphincterotome was withdrawn to the papilla and a 6mm sphincterotomy was made. The sphincterotome was then exchanged for a 9-12mm biliary extraction balloon. This was advanced into the duct over the wire and multiple balloon sweeps were performed. No stones were identified. A final occlusion cholangiogram was performed which revealed a patent duct without filling defects. Contrast was then swept from the duct, instruments were removed, and the procedure completed.       COMPLICATIONS:  None.     BLOOD LOSS:  None.     SPECIMENS:   None.    SUMMARY:  1.) multicystic lesion eminating from the liver. Suspect benign etiology  2.) perihepatic ascites  3.) pancreatic parenchymal changes c/w pancreatitis  4.) non dilated CBD s/p ERCP with CBD cannulation and sphincterotomy     RECOMMENDATIONS:   1.) restart low sodium diet  2.) restart diuretic therapy  3.) Consider cholecystectomy if surgery would consider it when MELD score is better and patient is not decompensated  4.) Patient will need outpatient referral to [place] for liver transplant consultation

## 2024-06-04 NOTE — ANESTHESIA POSTPROCEDURE EVALUATION
Patient: Ryley Gaines    Procedure Summary       Date: 06/04/24 Room / Location:  ENDOSCOPIC ULTRASOUND ROOM / SURGERY [facility]    Anesthesia Start: 1117 Anesthesia Stop: 1204    Procedures:       EGD, WITH ENDOSCOPIC US      ERCP, DIAGNOSTIC Diagnosis:       Cholelithiasis      (Cholelithiasis [574.ICD-9-CM])    Surgeons: Ryley Ortiz M.D. Responsible Provider:     Anesthesia Type: general ASA Status: 3            Final Anesthesia Type: general  Last vitals  BP   Blood Pressure: 110/62    Temp   36.9 °C (98.4 °F)    Pulse   95   Resp   17    SpO2   93 %      Anesthesia Post Evaluation    Patient location during evaluation: PACU  Patient participation: complete - patient participated  Level of consciousness: awake and alert  Pain score: 2    Airway patency: patent  Anesthetic complications: no  Cardiovascular status: hemodynamically stable  Respiratory status: acceptable  Hydration status: euvolemic    PONV: none          No notable events documented.     Nurse Pain Score: 3 (NPRS)

## 2024-06-04 NOTE — ASSESSMENT & PLAN NOTE
Nephrology has been consulted, we appreciate further recommendations.  Diuretics and lisinopril to be discontinued for now as per nephrology.  Continue to monitor closely.    6/6: Cr today is 1.18. We appreciate further recommendations by nephrology.    6/7: Cr today is .88.  Nephrology to evaluate when can he restart his diuretics.

## 2024-06-04 NOTE — ASSESSMENT & PLAN NOTE
Holding diuretics  GI following the patient, we appreciate further recommendations.    6/5: We have ordered paracentesis.  We appreciate further recommendations by GI.    6/6: Paracentesis was not able to be performed as there was no fluid collection that was amenable for paracentesis as per note from ultrasound.  We appreciate further recommendations by GI.  For now diuretics are on hold as per nephrology, also nephrology following the patient.    6/7: Nephrology to evaluate today 1, will restart the patient's diuretics.  Continue to monitor closely, we appreciate further recommendations by GI.

## 2024-06-04 NOTE — ANESTHESIA TIME REPORT
Anesthesia Start and Stop Event Times       Date Time Event    6/4/2024 1111 Ready for Procedure     1117 Anesthesia Start     1204 Anesthesia Stop          Responsible Staff    No responsible staff documented.       Overtime Reason:  no overtime (within assigned shift)    Comments:

## 2024-06-04 NOTE — OR NURSING
1243 Pt states pain is controled and tolerable, denies nausea. Pt tolerating PO fluids. Report to Keke CHRISTIANSON    1256 Pt taken to room by transport in stable condition.

## 2024-06-04 NOTE — ANESTHESIA PREPROCEDURE EVALUATION
Case: [id number] Date/Time: 06/04/24 1045    Procedures:       EGD, WITH ENDOSCOPIC US      ERCP, DIAGNOSTIC    Location:  ENDOSCOPIC ULTRASOUND ROOM / SURGERY [facility]    Surgeons: Ryley Ortiz M.D.          52yo male c h/o cirrhosis, hereditary hemochromatosis, recurrent gallstones/pancreatitis, HTN, cigarette smoking for ERCP  Relevant Problems   No relevant active problems       Physical Exam    Airway   Mallampati: I  TM distance: >3 FB  Neck ROM: full       Cardiovascular - normal exam  Rhythm: regular  Rate: normal  (-) murmur     Dental - normal exam           Pulmonary - normal exam  Breath sounds clear to auscultation     Abdominal    Neurological - normal exam                   Anesthesia Plan    ASA 3   ASA physical status 3 criteria: active hepatitis    Plan - general       Airway plan will be ETT          Induction: intravenous    Postoperative Plan: Postoperative administration of opioids is intended.    Pertinent diagnostic labs and testing reviewed    Informed Consent:    Anesthetic plan and risks discussed with patient.    Use of blood products discussed with: patient whom consented to blood products.

## 2024-06-04 NOTE — CONSULTS
[facility] -  CONSULTATION NOTE               Author: Hunter Chao M.D. Date & Time: 6/4/2024  11:00 AM       REASON FOR CONSULTATION:   Acute Renal Failure    CHIEF COMPLAINT:   Abd pain    HISTORY OF PRESENT ILLNESS:    53 y.o. male, with h/o HTN, Hereditary Hemochromatosis c/b Cirrhosis requiring TIPS, who presented as a Direct Admission from [facility] for Gallstone Pancreatitis on 6/1/2024 in setting of elevaed lipase and CT e/o pancreatitis. Per chart review, abd pain for 5 days prior to admission. Underwent US liver and MRCP (poor quality) upon admission and GI consulted who recommended EGD/EUS, ERCP with sphincterotomy (no Cholecystectomy due to MELD/surgical risk) which the patient underwent this AM. Currently recovering in the PACU. Creatinine 0.8 on 6/1 admission and climbed to 1.9 this AM prompting nephrology consult. No documented prolonged hypotension. Urine output not recorded, but notes decreased UOP yesterday. Renal US normal. Did get contrast on 6/1. Transiently on IVFs and got lisinopril earlier during the hospital stay. Notes ongoing abd pain/distended abdomen and leg swelling above normal. No chest pain or SOB.      REVIEW OF SYSTEMS:    10 point ROS performed, negative other than stated above    PAST MEDICAL HISTORY:   Cirrhosis  Hereditary hemochromatosis  HTN    PAST SURGICAL HISTORY:   TIPS    FAMILY HISTORY:   No family history of renal disease    SOCIAL HISTORY:   +tobacco, No EtOH, No illicits    HOME MEDICATIONS:   No current facility-administered medications on file prior to encounter.     Current Outpatient Medications on File Prior to Encounter   Medication Sig Dispense Refill    spironolactone (ALDACTONE) 25 MG Tab Take 25 mg by mouth every day.      ondansetron (ZOFRAN ODT) 4 MG TABLET DISPERSIBLE Take 4 mg by mouth every 6 hours as needed for Nausea/Vomiting.      ibuprofen (MOTRIN) 200 MG Tab Take 200 mg by mouth every 6 hours as needed for Mild  "Pain.      riFAXIMin (XIFAXAN) 550 MG Tab tablet Take 500 mg by mouth every day. No known stop date      furosemide (LASIX) 40 MG Tab Take 40 mg by mouth 1 time a day as needed. Indications: Edema      omeprazole (PRILOSEC) 40 MG delayed-release capsule Take 40 mg by mouth every day.      traZODone (DESYREL) 50 MG Tab Take 50 mg by mouth every evening.      gabapentin (NEURONTIN) 300 MG Cap Take 300 mg by mouth 2 times a day.      multivitamin Tab Take 1 Tablet by mouth every day.      lisinopril (PRINIVIL) 10 MG Tab Take 10 mg by mouth every day.      ciprofloxacin (CIPRO) 500 MG Tab Take 500 mg by mouth every day. No stop date         ALLERGIES:  Patient has no known allergies.    PHYSICAL EXAM:  VS:  /55   Pulse 75   Temp 36 °C (96.8 °F) (Temporal)   Resp 18   Ht 1.905 m (6' 3\")   Wt 120 kg (264 lb 12.4 oz)   SpO2 94%   BMI 33.09 kg/m²   GENERAL: no acute distress  CV: RRR, No edema  RESP: non-labored  GI: Soft  MSK: No joint deformities   SKIN: No concerning rashes  NEURO: AOx3  PSYCH: Cooperative    LABS:  Recent Results (from the past 24 hour(s))   CBC WITHOUT DIFFERENTIAL    Collection Time: 06/04/24  1:28 AM   Result Value Ref Range    WBC 9.6 4.8 - 10.8 K/uL    RBC 3.76 (L) 4.70 - 6.10 M/uL    Hemoglobin 12.4 (L) 14.0 - 18.0 g/dL    Hematocrit 38.0 (L) 42.0 - 52.0 %    .1 (H) 81.4 - 97.8 fL    MCH 33.0 27.0 - 33.0 pg    MCHC 32.6 32.3 - 36.5 g/dL    RDW 58.0 (H) 35.9 - 50.0 fL    Platelet Count 120 (L) 164 - 446 K/uL    MPV 11.9 9.0 - 12.9 fL   Comp Metabolic Panel    Collection Time: 06/04/24  1:28 AM   Result Value Ref Range    Sodium 128 (L) 135 - 145 mmol/L    Potassium 4.0 3.6 - 5.5 mmol/L    Chloride 96 96 - 112 mmol/L    Co2 21 20 - 33 mmol/L    Anion Gap 11.0 7.0 - 16.0    Glucose 96 65 - 99 mg/dL    Bun 29 (H) 8 - 22 mg/dL    Creatinine 1.96 (H) 0.50 - 1.40 mg/dL    Calcium 8.4 8.4 - 10.2 mg/dL    Correct Calcium 9.3 8.5 - 10.5 mg/dL    AST(SGOT) 49 (H) 12 - 45 U/L    ALT(SGPT) " 24 2 - 50 U/L    Alkaline Phosphatase 180 (H) 30 - 99 U/L    Total Bilirubin 4.2 (H) 0.1 - 1.5 mg/dL    Albumin 2.9 (L) 3.2 - 4.9 g/dL    Total Protein 6.0 6.0 - 8.2 g/dL    Globulin 3.1 1.9 - 3.5 g/dL    A-G Ratio 0.9 g/dL   ESTIMATED GFR    Collection Time: 06/04/24  1:28 AM   Result Value Ref Range    GFR (CKD-EPI) 40 (A) >60 mL/min/1.73 m 2       (click the triangle to expand results)    IMAGING:  US-RENAL   Final Result         1.  Normal renal ultrasound.   2.  Splenomegaly   3.  Abdominal ascites      US-LIVER AND VESSELS LTD (TIPS) (COMBO)   Final Result         1. Cirrhotic appearance of the liver.   2. Cholelithiasis. There is wall thickening of the gallbladder which may be seen with acute cholecystitis or could be related to the hepatic dysfunction.   3. Ascites.   4. The TIPS is not occluded.   5. Limited exam.      [id number]   Final Result      1.  Study is substantially degraded by motion artifacts.   2.  No intra or extrahepatic bile duct dilation. CBD diameter is 5 mm. The MRCP imaging is otherwise degraded and cannot accurately exclude choledocholithiasis although none is clearly visible.   3.  Gallbladder wall thickening and edema. Gallstones are present, better seen on comparison prior CT study. There is possible cholecystitis. This could be definitively confirmed with HIDA scan if indicated.   4.  2.5 x 1.4 cm lesion in pancreatic head. This could be phlegmon or early pseudocyst. Recommend follow-up imaging to assess resolution and exclude a pancreas mass.   5.  Fat stranding around the pancreas head and region, recommend laboratory correlation for pancreatitis.   6.  2.6 cm irregular spiculated nodular lesion along the inferior aspect of gastric body. This has some calcifications within it as seen on the previous CT study. The lesion is indeterminate. Recommend PET CT workup.   7.  Wall thickening of the proximal duodenum which could be due to duodenitis or cholecystitis.   8.  Cirrhosis,  splenomegaly. TIPS in place.   9.  Mild to moderate ascites.      [id number] DUCTS    (Results Pending)   DX-PORTABLE FLUOROSCOPY < 1 HOUR    (Results Pending)       ASSESSMENT:  # BACILIO: Baseline Cr~, increased to 1.8. In setting of Pancreatitis, contrast, RAASi.  # Pancreatitis: concern for gallstone. S/p sphincterotomy   # Cirrhosis: secondary to hereditary hemochromatosis   # Hx HTN  # Hyponatremia   # Anemia    PLAN:  -No role for RRT  -Hold diuretics, RAASi for now  -UA and urine sodium  -Albumin challenge, holding on midodrine/octreotide  -Strict I/Os  -Daily labs  -Dose all meds per eGFR <60    Discussed with hospitalist      Thank you for this consult, we will continue to follow    Hunter Chao MD

## 2024-06-04 NOTE — OR NURSING
1202: Patient arrived to PACU. Respirations even and unlabored. VSS.     1212: Report given to Ximena CHRISTIANSON

## 2024-06-04 NOTE — PROGRESS NOTES
"Hospital Medicine Daily Progress Note    Date of Service  6/4/2024    Chief Complaint  Ryley Gaines is a 53 y.o. male admitted 6/1/2024 with upper quadrant abdominal pain    Hospital Course  As per chart review:  \"Patient is a pleasant 53-year-old male who was a direct admission from [facility] for gallstone pancreatitis.  He has been having right upper quadrant pain for the last 5 days and has had multiple MRCP's in the past.  He states that he has been told that he has not had a stone in the common bile duct but stones are present in the gallbladder.  MRCP currently pending and will get the appropriate consultation thereafter whether it be general surgery for the addition of GI depending on if there is a CBD stone.  Patient has underlying hereditary hemochromatosis and is on rifaximin.  \"    Interval Problem Update  6/2 patient seen this morning and complains of abdominal pain right upper quadrant however was feeling markedly better after pain medication.  No negative Hernandez's at time of my examination.  I think he has had festering cholecystitis and the question is whether or not he has a common bile duct stone with a bilirubin of 4.5.  Lipase is slightly elevated to 49.  Will continue with pain management and will get appropriate consultation after MRCP is resulted.    6/3 patient states he feels the same today as he did yesterday.  The ultrasound was somewhat uncomfortable.  He is still receiving pain medication and is doing better today than compared to yesterday as far as pain control.  TIPS is not obstructed on ultrasound.  Gastroenterology to consult and recommendations forthcoming.    PATIENT SEEN BY PREVIOUS HOSPITALIST UNTIL 6/3    6/4: Patient had elevated creatinine today, we have consulted nephrology.  As per the recommendation we have stopped lisinopril and diuretics for now.  Patient to undergo ERCP today by GI, we appreciate further recommendations.  Continue to monitor closely.    I have " discussed this patient's plan of care and discharge plan at IDT rounds today with Case Management, Nursing, Nursing leadership, and other members of the IDT team.    Consultants/Specialty  GI  Nephrology    Code Status  Full Code    Disposition  The patient is not medically cleared for discharge to home or a post-acute facility.        Review of Systems  Review of Systems   Constitutional:  Negative for chills and fever.   HENT:  Negative for congestion.    Eyes:  Negative for blurred vision and photophobia.   Respiratory:  Negative for cough and shortness of breath.    Cardiovascular:  Negative for chest pain, claudication and leg swelling.   Gastrointestinal:  Positive for abdominal pain. Negative for constipation, diarrhea, heartburn, nausea and vomiting.   Genitourinary:  Negative for dysuria and hematuria.   Musculoskeletal:  Negative for joint pain and myalgias.   Skin:  Negative for itching and rash.   Neurological:  Negative for dizziness, sensory change, speech change, weakness and headaches.   Psychiatric/Behavioral:  Negative for depression. The patient is not nervous/anxious and does not have insomnia.         Physical Exam  Temp:  [36 °C (96.8 °F)-36.8 °C (98.2 °F)] 36.8 °C (98.2 °F)  Pulse:  [] 100  Resp:  [12-18] 17  BP: ()/(47-79) 104/60  SpO2:  [93 %-100 %] 93 %    Physical Exam  Vitals and nursing note reviewed.   Constitutional:       General: He is not in acute distress.     Appearance: Normal appearance.   HENT:      Head: Normocephalic and atraumatic.   Eyes:      General: No scleral icterus.     Extraocular Movements: Extraocular movements intact.   Cardiovascular:      Rate and Rhythm: Normal rate and regular rhythm.      Pulses: Normal pulses.      Heart sounds: Normal heart sounds. No murmur heard.  Pulmonary:      Effort: Pulmonary effort is normal. No respiratory distress.      Breath sounds: Normal breath sounds. No wheezing, rhonchi or rales.   Abdominal:      General:  Abdomen is flat. Bowel sounds are normal. There is no distension.      Palpations: Abdomen is soft.      Tenderness: There is abdominal tenderness (Right upper quadrant). There is no guarding or rebound.   Musculoskeletal:         General: No swelling or tenderness.      Cervical back: Normal range of motion and neck supple.   Lymphadenopathy:      Cervical: No cervical adenopathy.   Skin:     Coloration: Skin is not jaundiced.      Findings: No erythema.   Neurological:      General: No focal deficit present.      Mental Status: He is alert and oriented to person, place, and time. Mental status is at baseline.      Cranial Nerves: No cranial nerve deficit.   Psychiatric:         Mood and Affect: Mood normal.         Behavior: Behavior normal.         Fluids    Intake/Output Summary (Last 24 hours) at 6/4/2024 1438  Last data filed at 6/4/2024 1204  Gross per 24 hour   Intake 1060 ml   Output --   Net 1060 ml       Laboratory  Recent Labs     06/01/24 2303 06/03/24  0140 06/04/24  0128   WBC 7.5 8.2 9.6   RBC 3.67* 3.70* 3.76*   HEMOGLOBIN 12.2* 12.3* 12.4*   HEMATOCRIT 36.6* 37.0* 38.0*   MCV 99.7* 100.0* 101.1*   MCH 33.2* 33.2* 33.0   MCHC 33.3 33.2 32.6   RDW 56.9* 56.7* 58.0*   PLATELETCT 85* 121* 120*   MPV 12.1 12.2 11.9     Recent Labs     06/01/24 2303 06/03/24  0140 06/04/24  0128   SODIUM 134* 133* 128*   POTASSIUM 4.1 4.2 4.0   CHLORIDE 101 99 96   CO2 22 22 21   GLUCOSE 85 90 96   BUN 20 22 29*   CREATININE 0.80 0.92 1.96*   CALCIUM 8.3* 8.2* 8.4                   Imaging  [id number] DUCTS   Final Result      Digitized intraoperative radiograph is submitted for review. This examination is not for diagnostic purpose but for guidance during a surgical procedure. Please see the patient's chart for full procedural details.         INTERPRETING LOCATION: [address] [place] [address], [facility]      DX-PORTABLE FLUOROSCOPY < 1 HOUR   Final Result      Portable fluoroscopy utilized for 0.7 seconds.          INTERPRETING LOCATION: [address] [facility], [place], [address]      US-RENAL   Final Result         1.  Normal renal ultrasound.   2.  Splenomegaly   3.  Abdominal ascites      US-LIVER AND VESSELS LTD (TIPS) (COMBO)   Final Result         1. Cirrhotic appearance of the liver.   2. Cholelithiasis. There is wall thickening of the gallbladder which may be seen with acute cholecystitis or could be related to the hepatic dysfunction.   3. Ascites.   4. The TIPS is not occluded.   5. Limited exam.      [id number]   Final Result      1.  Study is substantially degraded by motion artifacts.   2.  No intra or extrahepatic bile duct dilation. CBD diameter is 5 mm. The MRCP imaging is otherwise degraded and cannot accurately exclude choledocholithiasis although none is clearly visible.   3.  Gallbladder wall thickening and edema. Gallstones are present, better seen on comparison prior CT study. There is possible cholecystitis. This could be definitively confirmed with HIDA scan if indicated.   4.  2.5 x 1.4 cm lesion in pancreatic head. This could be phlegmon or early pseudocyst. Recommend follow-up imaging to assess resolution and exclude a pancreas mass.   5.  Fat stranding around the pancreas head and region, recommend laboratory correlation for pancreatitis.   6.  2.6 cm irregular spiculated nodular lesion along the inferior aspect of gastric body. This has some calcifications within it as seen on the previous CT study. The lesion is indeterminate. Recommend PET CT workup.   7.  Wall thickening of the proximal duodenum which could be due to duodenitis or cholecystitis.   8.  Cirrhosis, splenomegaly. TIPS in place.   9.  Mild to moderate ascites.           Assessment/Plan  * Acute gallstone pancreatitis- (present on admission)  Assessment & Plan  -Inpatient Status Medical Floor  Clear liquid diet, n.p.o. at midnight pending GI intervention  Discussed with general surgery, felt patient would be very high risk for any type of  "surgical intervention and if there was evidence of gallbladder distention cholecystotomy tube recommended  Ultrasound liver and TIPS, no occlusion in the catheter, still concern for acute cholecystitis    6/4: Patient to have ERCP today, we appreciate further recommendations by GI.  Concern for cholecystitis, will continue antibiotics, follow cultures.    BACILIO (acute kidney injury) (HCC)  Assessment & Plan  Nephrology has been consulted, we appreciate further recommendations.  Diuretics and lisinopril to be discontinued for now as per nephrology.  Continue to monitor closely.    Hyponatremia  Assessment & Plan  In the setting of cirrhosis  monitor    Cirrhosis (HCC)  Assessment & Plan  Holding diuretics  GI following the patient, we appreciate further recommendations.    Jaundice  Assessment & Plan  Since current bilirubin 4, states he usually runs around 2 at his baseline  No evidence of choledocholithiasis on MRCP however study was very poor, cannot completely be excluded  Appreciate GI consultation    6/4: Gi following and patient to undergo ERCP today.    Thrombocytopenia (HCC)  Assessment & Plan  Platelets up to 121 today      Hereditary hemochromatosis (HCC)  Assessment & Plan  -He takes rifaximin  -Baseline MELD 17    Encounter for tobacco use cessation counseling  Assessment & Plan  As per previous hospitalist:  \"Patient smokes 5 cigarettes/ Day but did not smoke over the past 5 days since he is not feeling well. He desires to quit smoking and I encourage to consider to use these 5 days as being already quit. I offered Nicotine Patch and he does not feel needing at this time. I encourage him to continue efforts in quit smoking. Total amount time dedicated to tobacco smoking counseling was 5 min.\"         VTE prophylaxis: SCDs    I have performed a physical exam and reviewed and updated ROS and Plan today (6/4/2024). In review of yesterday's note (6/3/2024), there are no changes except as documented " above.      I spend at least 51 minutes providing care for this patient.  This includes face-to-face interview, physical examination.  Review of lab work including CBC, CMP.  Consulting and discussing with nephrology.  Discussing with multidisciplinary team including case management, nursing staff and pharmacy.  Creating plan of care, reviewing orders.

## 2024-06-04 NOTE — CARE PLAN
The patient is Stable - Low risk of patient condition declining or worsening    Shift Goals  Clinical Goals: Pain management, NPO at midnight,Monitor for s/s of infection  Patient Goals: Pain management, Comfort, Rest    Progress made toward(s) clinical / shift goals:  Patient has been compliant with all aspects of care. His pain has been adequately controlled with prescribed medications. He uses the call light appropriately to ask for assistance. He verbalizes understanding of the plan of care and upcoming procedures.    Patient is not progressing towards the following goals:

## 2024-06-05 NOTE — PROGRESS NOTES
Gastroenterology Progress Note     Author: Ryley Ortiz M.D.   Date & Time Created: 6/5/2024 12:31 PM    Chief Complaint:  pancreatitis    Interval History:  Patient reports increased abdominal pain today. Lipase>3000. No nausea. Says he is getting more swollen. Urinating well.    Review of Systems:  Review of Systems   Constitutional:  Negative for chills and fever.   Respiratory:  Negative for cough and shortness of breath.    Cardiovascular:  Negative for chest pain and palpitations.   Gastrointestinal:  Positive for abdominal pain. Negative for blood in stool, constipation, diarrhea, heartburn, melena, nausea and vomiting.       Physical Exam:  Physical Exam  Constitutional:       General: He is not in acute distress.     Appearance: Normal appearance. He is obese.   Cardiovascular:      Rate and Rhythm: Normal rate and regular rhythm.      Heart sounds: No murmur heard.  Pulmonary:      Effort: Pulmonary effort is normal. No respiratory distress.      Breath sounds: Normal breath sounds. No wheezing.   Abdominal:      General: Bowel sounds are normal. There is distension.      Palpations: Abdomen is soft.      Tenderness: There is abdominal tenderness. There is no guarding or rebound.   Musculoskeletal:      Right lower leg: Edema present.      Left lower leg: Edema present.   Skin:     General: Skin is warm and dry.   Neurological:      Mental Status: He is alert.         Labs:          Recent Labs     06/03/24  0140 06/04/24 0128 06/05/24 0315   SODIUM 133* 128* 129*   POTASSIUM 4.2 4.0 3.9   CHLORIDE 99 96 97   CO2 22 21 20   BUN 22 29* 30*   CREATININE 0.92 1.96* 2.10*   CALCIUM 8.2* 8.4 8.1*     Recent Labs     06/03/24 0140 06/04/24 0128 06/05/24  0315   ALTSGPT 23 24 19   ASTSGOT 52* 49* 52*   ALKPHOSPHAT 179* 180* 133*   TBILIRUBIN 4.0* 4.2* 3.3*   LIPASE 270*  --  >3000*   GLUCOSE 90 96 108*     Recent Labs     06/03/24 0140 06/04/24 0128 06/05/24 0315   RBC 3.70* 3.76* 3.13*   HEMOGLOBIN  12.3* 12.4* 10.1*   HEMATOCRIT 37.0* 38.0* 31.2*   PLATELETCT 121* 120* 107*   IRON <5*  --   --    FERRITIN 168.0  --   --    TOTIRONBC see below  --   --      Recent Labs     24  0140 24  0128 24  0315   WBC 8.2 9.6 5.3   ASTSGOT 52* 49* 52*   ALTSGPT    ALKPHOSPHAT 179* 180* 133*   TBILIRUBIN 4.0* 4.2* 3.3*     Hemodynamics:  Temp (24hrs), Av.7 °C (98.1 °F), Min:36.5 °C (97.7 °F), Max:36.9 °C (98.4 °F)  Temperature: 36.6 °C (97.9 °F)  Pulse  Av.5  Min: 56  Max: 100   Blood Pressure: 126/65     Respiratory:    Respiration: 17, Pulse Oximetry: 96 %     Work Of Breathing / Effort: Within Normal Limits     Fluids:    Intake/Output Summary (Last 24 hours) at 2024 1231  Last data filed at 2024 0857  Gross per 24 hour   Intake 220 ml   Output --   Net 220 ml        GI/Nutrition:  Orders Placed This Encounter   Procedures    Diet Order Diet: 2 Gram Sodium     Standing Status:   Standing     Number of Occurrences:   1     Order Specific Question:   Diet:     Answer:   2 Gram Sodium [7]     Medical Decision Making, by Problem:  Active Hospital Problems    Diagnosis     *Acute gallstone pancreatitis [K85.10]     BACILIO (acute kidney injury) (HCC) [N17.9]     Cirrhosis (HCC) [K74.60]     Hyponatremia [E87.1]     Jaundice [R17]     Encounter for tobacco use cessation counseling [Z71.6]     Hereditary hemochromatosis (HCC) [E83.110]     Thrombocytopenia (HCC) [D69.6]        Plan:  54 yo male with ETOH cirrhosis and gallstone pancreatitis. He is not a surgical candidate due to high MELD. ERCP performed for sphincterotomy. Increased abdominal pain today with elevated lipase c/w flare of pancreatitis after ERCP. Agree with IV fluids. May consider low dose Furosemide but creatinine worse today. Defer to Nephrology. Agree with paracentesis. Monitor kidney function. Continue low sodium diet    Quality-Core Measures

## 2024-06-05 NOTE — PROGRESS NOTES
Assumed care, patient awake and alert. No complaints of pain or discomfort. Patient stated he ate half a sandwich and did not experience GI upset or nausea. Reviewed plan of care for the night.

## 2024-06-05 NOTE — PROGRESS NOTES
"[facility] -  PROGRESS NOTE               Author: Hunter Chao M.D. Date & Time: 6/5/2024  10:55 AM     HPI:  53 y.o. male, with h/o HTN, Hereditary Hemochromatosis c/b Cirrhosis requiring TIPS, who presented as a Direct Admission from [facility] for Gallstone Pancreatitis on 6/1/2024 in setting of elevaed lipase and CT e/o pancreatitis. Per chart review, abd pain for 5 days prior to admission. Underwent US liver and MRCP (poor quality) upon admission and GI consulted who recommended EGD/EUS, ERCP with sphincterotomy (no Cholecystectomy due to MELD/surgical risk) which the patient underwent this AM. Currently recovering in the PACU. Creatinine 0.8 on 6/1 admission and climbed to 1.9 this AM prompting nephrology consult. No documented prolonged hypotension. Urine output not recorded, but notes decreased UOP yesterday. Renal US normal. Did get contrast on 6/1. Transiently on IVFs and got lisinopril earlier during the hospital stay. Notes ongoing abd pain/distended abdomen and leg swelling above normal. No chest pain or SOB.     DAILY NEPHROLOGY SUMMARY:  6/4: consult done  6/5: stable hemodynamics, UOP not recorded, urine sodium low, lipase increased to >3000, ongoing abd pain-IVF running     PAST FAMILY HISTORY: Reviewed and Unchanged  SOCIAL HISTORY: Reviewed and Unchanged  CURRENT MEDICATIONS: Reviewed  IMAGING STUDIES: Reviewed    ROS  10 point ROS performed, negative other than stated above     PHYSICAL EXAM  VS:  /65   Pulse 87   Temp 36.6 °C (97.9 °F) (Oral)   Resp 17   Ht 1.905 m (6' 3\")   Wt 120 kg (264 lb 12.4 oz)   SpO2 96%   BMI 33.09 kg/m²   GENERAL: no acute distress  CV: RRR, +edema  RESP: non-labored  GI: distended  MSK: No joint deformities   SKIN: No concerning rashes  NEURO: AOx3  PSYCH: Cooperative    Fluids:  In: 600 [I.V.:600]  Out: -     LABS:  Recent Results (from the past 24 hour(s))   URINALYSIS    Collection Time: 06/04/24  2:25 PM    Specimen: " Urine, Clean Catch   Result Value Ref Range    Color Yellow     Character Hazy (A)     Specific Gravity 1.020 <1.035    Ph 5.5 5.0 - 8.0    Glucose Negative Negative mg/dL    Ketones 15 (A) Negative mg/dL    Protein 30 (A) Negative mg/dL    Bilirubin Moderate (A) Negative    Nitrite Positive (A) Negative    Leukocyte Esterase Negative Negative    Occult Blood Negative Negative    Micro Urine Req Microscopic    URINE SODIUM RANDOM    Collection Time: 06/04/24  2:25 PM   Result Value Ref Range    Sodium, Urine -per volume <20 mmol/L   URINE MICROSCOPIC (W/UA)    Collection Time: 06/04/24  2:25 PM   Result Value Ref Range    WBC 2-5 (A) /hpf    RBC 0-2 (A) /hpf    Bacteria Few (A) None /hpf    Epithelial Cells Few Few /hpf   CBC WITHOUT DIFFERENTIAL    Collection Time: 06/05/24  3:15 AM   Result Value Ref Range    WBC 5.3 4.8 - 10.8 K/uL    RBC 3.13 (L) 4.70 - 6.10 M/uL    Hemoglobin 10.1 (L) 14.0 - 18.0 g/dL    Hematocrit 31.2 (L) 42.0 - 52.0 %    MCV 99.7 (H) 81.4 - 97.8 fL    MCH 32.3 27.0 - 33.0 pg    MCHC 32.4 32.3 - 36.5 g/dL    RDW 55.3 (H) 35.9 - 50.0 fL    Platelet Count 107 (L) 164 - 446 K/uL    MPV 11.7 9.0 - 12.9 fL   Comp Metabolic Panel    Collection Time: 06/05/24  3:15 AM   Result Value Ref Range    Sodium 129 (L) 135 - 145 mmol/L    Potassium 3.9 3.6 - 5.5 mmol/L    Chloride 97 96 - 112 mmol/L    Co2 20 20 - 33 mmol/L    Anion Gap 12.0 7.0 - 16.0    Glucose 108 (H) 65 - 99 mg/dL    Bun 30 (H) 8 - 22 mg/dL    Creatinine 2.10 (H) 0.50 - 1.40 mg/dL    Calcium 8.1 (L) 8.4 - 10.2 mg/dL    Correct Calcium 8.8 8.5 - 10.5 mg/dL    AST(SGOT) 52 (H) 12 - 45 U/L    ALT(SGPT) 19 2 - 50 U/L    Alkaline Phosphatase 133 (H) 30 - 99 U/L    Total Bilirubin 3.3 (H) 0.1 - 1.5 mg/dL    Albumin 3.1 (L) 3.2 - 4.9 g/dL    Total Protein 5.2 (L) 6.0 - 8.2 g/dL    Globulin 2.1 1.9 - 3.5 g/dL    A-G Ratio 1.5 g/dL   LIPASE    Collection Time: 06/05/24  3:15 AM   Result Value Ref Range    Lipase >3000 (H) 11 - 82 U/L   PLATELET  ESTIMATE    Collection Time: 06/05/24  3:15 AM   Result Value Ref Range    Plt Estimation Decreased    MORPHOLOGY    Collection Time: 06/05/24  3:15 AM   Result Value Ref Range    RBC Morphology Normal    ESTIMATED GFR    Collection Time: 06/05/24  3:15 AM   Result Value Ref Range    GFR (CKD-EPI) 37 (A) >60 mL/min/1.73 m 2       (click the triangle to expand results)      ASSESSMENT:  # BACILIO: Cr increased to 1.8. In setting of Pancreatitis, contrast, RAASi.  # Pancreatitis: concern for gallstone. S/p sphincterotomy   # Cirrhosis: secondary to hereditary hemochromatosis   # Hx HTN  # Hyponatremia: in setting of cirrhosis   # Anemia     PLAN:  -No role for RRT  -Hold diuretics, RAASi for now  -IVF, additional dose of albumin today with paracentesis  -Holding on midodrine/octreotide for now  -Strict I/Os  -Daily labs  -Dose all meds per eGFR <60     Discussed with hospitalist       Thank you,     Hunter Chao MD

## 2024-06-05 NOTE — PROGRESS NOTES
"Hospital Medicine Daily Progress Note    Date of Service  6/5/2024    Chief Complaint  Ryley Gaines is a 53 y.o. male admitted 6/1/2024 with upper quadrant abdominal pain    Hospital Course  As per chart review:  \"Patient is a pleasant 53-year-old male who was a direct admission from [facility] for gallstone pancreatitis.  He has been having right upper quadrant pain for the last 5 days and has had multiple MRCP's in the past.  He states that he has been told that he has not had a stone in the common bile duct but stones are present in the gallbladder.  MRCP currently pending and will get the appropriate consultation thereafter whether it be general surgery for the addition of GI depending on if there is a CBD stone.  Patient has underlying hereditary hemochromatosis and is on rifaximin.  \"    Interval Problem Update  6/2 patient seen this morning and complains of abdominal pain right upper quadrant however was feeling markedly better after pain medication.  No negative Hernandez's at time of my examination.  I think he has had festering cholecystitis and the question is whether or not he has a common bile duct stone with a bilirubin of 4.5.  Lipase is slightly elevated to 49.  Will continue with pain management and will get appropriate consultation after MRCP is resulted.    6/3 patient states he feels the same today as he did yesterday.  The ultrasound was somewhat uncomfortable.  He is still receiving pain medication and is doing better today than compared to yesterday as far as pain control.  TIPS is not obstructed on ultrasound.  Gastroenterology to consult and recommendations forthcoming.    PATIENT SEEN BY PREVIOUS HOSPITALIST UNTIL 6/3    6/4: Patient had elevated creatinine today, we have consulted nephrology.  As per the recommendation we have stopped lisinopril and diuretics for now.  Patient to undergo ERCP today by GI, we appreciate further recommendations.  Continue to monitor closely.    6/5: Patient " seen at bedside this morning.  Patient laying in bed, lipase level greater than 3000.  We started the patient on IV fluids.  I discussed case with GI for now we will continue IV fluids and we will order paracentesis.  Creatinine is 2.1.  Nephrology following the patient, we appreciate further recommendations.    I have discussed this patient's plan of care and discharge plan at IDT rounds today with Case Management, Nursing, Nursing leadership, and other members of the IDT team.    Consultants/Specialty  GI  Nephrology    Code Status  Full Code    Disposition  Medically Cleared    Review of Systems  Review of Systems   Constitutional:  Negative for chills and fever.   HENT:  Negative for congestion.    Eyes:  Negative for blurred vision and photophobia.   Respiratory:  Negative for cough and shortness of breath.    Cardiovascular:  Negative for chest pain, claudication and leg swelling.   Gastrointestinal:  Positive for abdominal pain. Negative for constipation, diarrhea, heartburn, nausea and vomiting.   Genitourinary:  Negative for dysuria and hematuria.   Musculoskeletal:  Negative for joint pain and myalgias.   Skin:  Negative for itching and rash.   Neurological:  Negative for dizziness, sensory change, speech change, weakness and headaches.   Psychiatric/Behavioral:  Negative for depression. The patient is not nervous/anxious and does not have insomnia.         Physical Exam  Temp:  [36.5 °C (97.7 °F)-36.9 °C (98.4 °F)] 36.6 °C (97.9 °F)  Pulse:  [] 87  Resp:  [17] 17  BP: (104-136)/(58-79) 126/65  SpO2:  [92 %-99 %] 96 %    Physical Exam  Vitals and nursing note reviewed.   Constitutional:       General: He is not in acute distress.     Appearance: Normal appearance.   HENT:      Head: Normocephalic and atraumatic.   Eyes:      General: No scleral icterus.     Extraocular Movements: Extraocular movements intact.   Cardiovascular:      Rate and Rhythm: Normal rate and regular rhythm.      Pulses: Normal  pulses.      Heart sounds: Normal heart sounds. No murmur heard.  Pulmonary:      Effort: Pulmonary effort is normal. No respiratory distress.      Breath sounds: Normal breath sounds. No wheezing, rhonchi or rales.   Abdominal:      General: Abdomen is flat. Bowel sounds are normal. There is no distension.      Palpations: Abdomen is soft.      Tenderness: There is abdominal tenderness (Right upper quadrant). There is no guarding or rebound.   Musculoskeletal:         General: No swelling or tenderness.      Cervical back: Normal range of motion and neck supple.   Lymphadenopathy:      Cervical: No cervical adenopathy.   Skin:     Coloration: Skin is not jaundiced.      Findings: No erythema.   Neurological:      General: No focal deficit present.      Mental Status: He is alert and oriented to person, place, and time. Mental status is at baseline.      Cranial Nerves: No cranial nerve deficit.   Psychiatric:         Mood and Affect: Mood normal.         Behavior: Behavior normal.         Fluids    Intake/Output Summary (Last 24 hours) at 6/5/2024 1240  Last data filed at 6/5/2024 0857  Gross per 24 hour   Intake 220 ml   Output --   Net 220 ml       Laboratory  Recent Labs     06/03/24  0140 06/04/24  0128 06/05/24  0315   WBC 8.2 9.6 5.3   RBC 3.70* 3.76* 3.13*   HEMOGLOBIN 12.3* 12.4* 10.1*   HEMATOCRIT 37.0* 38.0* 31.2*   .0* 101.1* 99.7*   MCH 33.2* 33.0 32.3   MCHC 33.2 32.6 32.4   RDW 56.7* 58.0* 55.3*   PLATELETCT 121* 120* 107*   MPV 12.2 11.9 11.7     Recent Labs     06/03/24  0140 06/04/24  0128 06/05/24  0315   SODIUM 133* 128* 129*   POTASSIUM 4.2 4.0 3.9   CHLORIDE 99 96 97   CO2 22 21 20   GLUCOSE 90 96 108*   BUN 22 29* 30*   CREATININE 0.92 1.96* 2.10*   CALCIUM 8.2* 8.4 8.1*                   Imaging  [id number] DUCTS   Final Result      Digitized intraoperative radiograph is submitted for review. This examination is not for diagnostic purpose but for guidance during a surgical  procedure. Please see the patient's chart for full procedural details.         INTERPRETING LOCATION: [address], [place], [address]      DX-PORTABLE FLUOROSCOPY < 1 HOUR   Final Result      Portable fluoroscopy utilized for 0.7 seconds.         INTERPRETING LOCATION: [address], [place], [address]      US-RENAL   Final Result         1.  Normal renal ultrasound.   2.  Splenomegaly   3.  Abdominal ascites      US-LIVER AND VESSELS LTD (TIPS) (COMBO)   Final Result         1. Cirrhotic appearance of the liver.   2. Cholelithiasis. There is wall thickening of the gallbladder which may be seen with acute cholecystitis or could be related to the hepatic dysfunction.   3. Ascites.   4. The TIPS is not occluded.   5. Limited exam.      [id number]/O   Final Result      1.  Study is substantially degraded by motion artifacts.   2.  No intra or extrahepatic bile duct dilation. CBD diameter is 5 mm. The MRCP imaging is otherwise degraded and cannot accurately exclude choledocholithiasis although none is clearly visible.   3.  Gallbladder wall thickening and edema. Gallstones are present, better seen on comparison prior CT study. There is possible cholecystitis. This could be definitively confirmed with HIDA scan if indicated.   4.  2.5 x 1.4 cm lesion in pancreatic head. This could be phlegmon or early pseudocyst. Recommend follow-up imaging to assess resolution and exclude a pancreas mass.   5.  Fat stranding around the pancreas head and region, recommend laboratory correlation for pancreatitis.   6.  2.6 cm irregular spiculated nodular lesion along the inferior aspect of gastric body. This has some calcifications within it as seen on the previous CT study. The lesion is indeterminate. Recommend PET CT workup.   7.  Wall thickening of the proximal duodenum which could be due to duodenitis or cholecystitis.   8.  Cirrhosis, splenomegaly. TIPS in place.   9.  Mild to moderate ascites.      US-PARACENTESIS, ABD WITH IMAGING     "(Results Pending)        Assessment/Plan  * Acute gallstone pancreatitis- (present on admission)  Assessment & Plan  -Inpatient Status Medical Floor  Clear liquid diet, n.p.o. at midnight pending GI intervention  Discussed with general surgery, felt patient would be very high risk for any type of surgical intervention and if there was evidence of gallbladder distention cholecystotomy tube recommended  Ultrasound liver and TIPS, no occlusion in the catheter, still concern for acute cholecystitis    6/4: Patient to have ERCP today, we appreciate further recommendations by GI.  Concern for cholecystitis, will continue antibiotics, follow cultures.    6/5: Lipase level greater than 3000 today we have started the patient on IV fluids.  I appreciate further recommendations by GI.    BACILIO (acute kidney injury) (HCC)  Assessment & Plan  Nephrology has been consulted, we appreciate further recommendations.  Diuretics and lisinopril to be discontinued for now as per nephrology.  Continue to monitor closely.    Hyponatremia  Assessment & Plan  In the setting of cirrhosis  monitor    Cirrhosis (HCC)  Assessment & Plan  Holding diuretics  GI following the patient, we appreciate further recommendations.    6/5: We have ordered paracentesis.  We appreciate further recommendations by GI.    Jaundice  Assessment & Plan  Since current bilirubin 4, states he usually runs around 2 at his baseline  No evidence of choledocholithiasis on MRCP however study was very poor, cannot completely be excluded  Appreciate GI consultation    6/4: Gi following and patient to undergo ERCP today.    Thrombocytopenia (HCC)  Assessment & Plan  Platelets up to 121 today      Hereditary hemochromatosis (HCC)  Assessment & Plan  -He takes rifaximin  -Baseline MELD 17    Encounter for tobacco use cessation counseling  Assessment & Plan  As per previous hospitalist:  \"Patient smokes 5 cigarettes/ Day but did not smoke over the past 5 days since he is not " "feeling well. He desires to quit smoking and I encourage to consider to use these 5 days as being already quit. I offered Nicotine Patch and he does not feel needing at this time. I encourage him to continue efforts in quit smoking. Total amount time dedicated to tobacco smoking counseling was 5 min.\"         VTE prophylaxis: SCDs    I have performed a physical exam and reviewed and updated ROS and Plan today (6/5/2024). In review of yesterday's note (6/4/2024), there are no changes except as documented above.      I spend at least 52 minutes providing care for this patient.  This includes face-to-face interview, physical examination.  Review of lab work including CBC, CMP, and lipase.  Discussing case with GI.  Discussing with multidisciplinary team including case management, nursing staff and pharmacy.  Creating plan of care, reviewing orders.  "

## 2024-06-05 NOTE — CARE PLAN
The patient is Stable - Low risk of patient condition declining or worsening    Shift Goals  Clinical Goals: Monitor lipase and fluid overload  Patient Goals: decrease swelling    Progress made toward(s) clinical / shift goals:  Patient reports feeling fluid overloaded, lipase high    Patient is not progressing towards the following goals:

## 2024-06-05 NOTE — CARE PLAN
The patient is Stable - Low risk of patient condition declining or worsening    Shift Goals  Clinical Goals: Tolerate NPO status for procedure.  Patient Goals: Pain control    Progress made toward(s) clinical / shift goals:  Tolerating diet, patient reports no nausea or vomiting post procedure.     Patient is not progressing towards the following goals:

## 2024-06-05 NOTE — CARE PLAN
The patient is Stable - Low risk of patient condition declining or worsening    Shift Goals  Clinical Goals: Pain management, Monitor labs  Patient Goals: Pain management, Comfort, Rest    Progress made toward(s) clinical / shift goals:  Patient's pain is resolving. He verbalizes an understanding of his current health conditions and the plan to correct it.       Patient is not progressing towards the following goals:

## 2024-06-06 NOTE — PROGRESS NOTES
"[facility] -  PROGRESS NOTE               Author: TOM Nascimento Date & Time: 6/6/2024  11:07 AM     HPI:  53 y.o. male, with h/o HTN, Hereditary Hemochromatosis c/b Cirrhosis requiring TIPS, who presented as a Direct Admission from [facility] for Gallstone Pancreatitis on 6/1/2024 in setting of elevaed lipase and CT e/o pancreatitis. Per chart review, abd pain for 5 days prior to admission. Underwent US liver and MRCP (poor quality) upon admission and GI consulted who recommended EGD/EUS, ERCP with sphincterotomy (no Cholecystectomy due to MELD/surgical risk) which the patient underwent this AM. Currently recovering in the PACU. Creatinine 0.8 on 6/1 admission and climbed to 1.9 this AM prompting nephrology consult. No documented prolonged hypotension. Urine output not recorded, but notes decreased UOP yesterday. Renal US normal. Did get contrast on 6/1. Transiently on IVFs and got lisinopril earlier during the hospital stay. Notes ongoing abd pain/distended abdomen and leg swelling above normal. No chest pain or SOB.     DAILY NEPHROLOGY SUMMARY:  6/4: consult done  6/5: stable hemodynamics, UOP not recorded, urine sodium low, lipase increased to >3000, ongoing abd pain-IVF running   6/6: IVF off, abdominal pain persistent but tolerable, UOP 900ml so far today SCr down to 1.1, lipase 127, CO2 119, SNa 133.  VSS -120s.  No CP SOB, still with persistent bilat LE pitting edema.  Reports did not get paracentesis due to \"lack of enough pocket\"     PAST FAMILY HISTORY: Reviewed and Unchanged  SOCIAL HISTORY: Reviewed and Unchanged  CURRENT MEDICATIONS: Reviewed  IMAGING STUDIES: Reviewed    ROS  10 point ROS performed, negative other than stated above     PHYSICAL EXAM  VS:  /64   Pulse 84   Temp 36.7 °C (98 °F) (Oral)   Resp 16   Ht 1.905 m (6' 3\")   Wt 120 kg (264 lb 12.4 oz)   SpO2 93%   BMI 33.09 kg/m²   GENERAL: no acute distress  CV: RRR, 3+ " pitting edema  RESP: non-labored, clear   GI: distended  MSK: No joint deformities   SKIN: No concerning rashes  NEURO: AOx3  PSYCH: Cooperative    Fluids:  In: 1755 [P.O.:1630; I.V.:125]  Out: 1025     LABS:  Recent Results (from the past 24 hour(s))   Prothrombin Time    Collection Time: 06/06/24  1:26 AM   Result Value Ref Range    PT 18.9 (H) 12.0 - 14.6 sec    INR 1.52 (H) 0.87 - 1.13   CBC WITHOUT DIFFERENTIAL    Collection Time: 06/06/24  1:26 AM   Result Value Ref Range    WBC 5.3 4.8 - 10.8 K/uL    RBC 3.53 (L) 4.70 - 6.10 M/uL    Hemoglobin 11.7 (L) 14.0 - 18.0 g/dL    Hematocrit 34.8 (L) 42.0 - 52.0 %    MCV 98.6 (H) 81.4 - 97.8 fL    MCH 33.1 (H) 27.0 - 33.0 pg    MCHC 33.6 32.3 - 36.5 g/dL    RDW 55.0 (H) 35.9 - 50.0 fL    Platelet Count 118 (L) 164 - 446 K/uL    MPV 11.7 9.0 - 12.9 fL   Comp Metabolic Panel    Collection Time: 06/06/24  1:26 AM   Result Value Ref Range    Sodium 133 (L) 135 - 145 mmol/L    Potassium 4.0 3.6 - 5.5 mmol/L    Chloride 99 96 - 112 mmol/L    Co2 19 (L) 20 - 33 mmol/L    Anion Gap 15.0 7.0 - 16.0    Glucose 93 65 - 99 mg/dL    Bun 21 8 - 22 mg/dL    Creatinine 1.18 0.50 - 1.40 mg/dL    Calcium 8.4 8.4 - 10.2 mg/dL    Correct Calcium 8.9 8.5 - 10.5 mg/dL    AST(SGOT) 55 (H) 12 - 45 U/L    ALT(SGPT) 22 2 - 50 U/L    Alkaline Phosphatase 159 (H) 30 - 99 U/L    Total Bilirubin 3.3 (H) 0.1 - 1.5 mg/dL    Albumin 3.4 3.2 - 4.9 g/dL    Total Protein 5.9 (L) 6.0 - 8.2 g/dL    Globulin 2.5 1.9 - 3.5 g/dL    A-G Ratio 1.4 g/dL   LIPASE    Collection Time: 06/06/24  1:26 AM   Result Value Ref Range    Lipase 127 (H) 11 - 82 U/L   ESTIMATED GFR    Collection Time: 06/06/24  1:26 AM   Result Value Ref Range    GFR (CKD-EPI) 74 >60 mL/min/1.73 m 2       (click the triangle to expand results)      ASSESSMENT:  # BACILIO: Cr increased to 1.8. In setting of Pancreatitis, contrast, RAASi.  -Improved to 1.1  # Pancreatitis: concern for gallstone. S/p sphincterotomy   - Lipase now 127  # Cirrhosis:  secondary to hereditary hemochromatosis   # Hx HTN  # Hyponatremia: in setting of cirrhosis   # Anemia     PLAN:  -No role for RRT  -Hold RAASi for now  -Additional dose of albumin if he gets paracentesis  -Holding on midodrine/octreotide for now  -Lasix 20mg dose once now given improvement in SCr   -Elevate legs, compression stockings   -Strict I/Os  -Daily labs  -Dose all meds per eGFR <60        Thank you,

## 2024-06-06 NOTE — CARE PLAN
Problem: Pain Management  Goal: Pain level will decrease to patient's comfort goal  Outcome: Progressing     Problem: Mobility  Goal: Risk for activity intolerance will decrease  Outcome: Progressing     Problem: Knowledge Deficit  Goal: Knowledge of the prescribed therapeutic regimen will improve  Outcome: Progressing     The patient is Stable - Low risk of patient condition declining or worsening    Shift Goals  Clinical Goals: Pt will remain free from intolerable pain or nausea throughout shift; pt will continue to tolerate PO intake  Patient Goals: Stay as comfortable as possible  Family Goals: n/a    Progress made toward(s) clinical / shift goals:  Pt remained from intolerable pain or nausea throughout shift. Pt reports satisfactory pain relief under currently prescribed regimen. Pt continues to tolerate PO intake.    Patient is not progressing towards the following goals: n/a

## 2024-06-06 NOTE — PROGRESS NOTES
Extra large compression stockings received, attempted to apply to pt legs. Unable to apply, attempted to re-order larger size but per [employer], no XX-large or XXX-large sizes are currently stocked.

## 2024-06-06 NOTE — CARE PLAN
The patient is Stable - Low risk of patient condition declining or worsening    Shift Goals  Clinical Goals: Control pain at 4/10 or less post intervention  Patient Goals: Sleep/rest overnight    Progress made toward(s) clinical / shift goals:  Patient has required multiple pain medications to control pain    Patient is not progressing towards the following goals:      Problem: Pain Management  Goal: Pain level will decrease to patient's comfort goal  Outcome: Not Progressing

## 2024-06-06 NOTE — PROGRESS NOTES
Bedside report received from night RN. Assumed care of patient. Daily plan of care discussed. Pt awake and alert, sitting up at bedside. Currently reporting persistent abdominal pain, will medicate per MAR as appropriate. 12 hour chart check complete. Hourly rounding in place.

## 2024-06-06 NOTE — PROGRESS NOTES
"Hospital Medicine Daily Progress Note    Date of Service  6/6/2024    Chief Complaint  Ryley Gaines is a 53 y.o. male admitted 6/1/2024 with upper quadrant abdominal pain    Hospital Course  As per chart review:  \"Patient is a pleasant 53-year-old male who was a direct admission from [facility] for gallstone pancreatitis.  He has been having right upper quadrant pain for the last 5 days and has had multiple MRCP's in the past.  He states that he has been told that he has not had a stone in the common bile duct but stones are present in the gallbladder.  MRCP currently pending and will get the appropriate consultation thereafter whether it be general surgery for the addition of GI depending on if there is a CBD stone.  Patient has underlying hereditary hemochromatosis and is on rifaximin.  \"    Interval Problem Update  6/2 patient seen this morning and complains of abdominal pain right upper quadrant however was feeling markedly better after pain medication.  No negative Hernandez's at time of my examination.  I think he has had festering cholecystitis and the question is whether or not he has a common bile duct stone with a bilirubin of 4.5.  Lipase is slightly elevated to 49.  Will continue with pain management and will get appropriate consultation after MRCP is resulted.    6/3 patient states he feels the same today as he did yesterday.  The ultrasound was somewhat uncomfortable.  He is still receiving pain medication and is doing better today than compared to yesterday as far as pain control.  TIPS is not obstructed on ultrasound.  Gastroenterology to consult and recommendations forthcoming.    PATIENT SEEN BY PREVIOUS HOSPITALIST UNTIL 6/3    6/4: Patient had elevated creatinine today, we have consulted nephrology.  As per the recommendation we have stopped lisinopril and diuretics for now.  Patient to undergo ERCP today by GI, we appreciate further recommendations.  Continue to monitor closely.    6/5: Patient " seen at bedside this morning.  Patient laying in bed, lipase level greater than 3000.  We started the patient on IV fluids.  I discussed case with GI for now we will continue IV fluids and we will order paracentesis.  Creatinine is 2.1.  Nephrology following the patient, we appreciate further recommendations.    6/6: Patient seen at bedside this morning.  Abdominal pain has improved, lipase level has improved.  We have discontinued IV fluids.  Creatinine also has improved to 1.18.  Nephrology and GI following the patient, we appreciate further recommendations.  Diuretics still on hold as per nephrology.  They were unable to perform paracentesis as per ultrasound report.    I have discussed this patient's plan of care and discharge plan at IDT rounds today with Case Management, Nursing, Nursing leadership, and other members of the IDT team.    Consultants/Specialty  GI  Nephrology    Code Status  Full Code    Disposition  The patient is not medically cleared for discharge to home or a post-acute facility.        Review of Systems  Review of Systems   Constitutional:  Negative for chills and fever.   HENT:  Negative for congestion.    Eyes:  Negative for blurred vision and photophobia.   Respiratory:  Negative for cough and shortness of breath.    Cardiovascular:  Negative for chest pain, claudication and leg swelling.   Gastrointestinal:  Positive for abdominal pain. Negative for constipation, diarrhea, heartburn, nausea and vomiting.   Genitourinary:  Negative for dysuria and hematuria.   Musculoskeletal:  Negative for joint pain and myalgias.   Skin:  Negative for itching and rash.   Neurological:  Negative for dizziness, sensory change, speech change, weakness and headaches.   Psychiatric/Behavioral:  Negative for depression. The patient is not nervous/anxious and does not have insomnia.         Physical Exam  Temp:  [36.5 °C (97.7 °F)-36.8 °C (98.3 °F)] 36.7 °C (98 °F)  Pulse:  [84-98] 84  Resp:  [16-17] 16  BP:  (104-126)/(61-72) 117/64  SpO2:  [93 %-97 %] 93 %    Physical Exam  Vitals and nursing note reviewed.   Constitutional:       General: He is not in acute distress.     Appearance: He is obese. He is ill-appearing.   HENT:      Head: Normocephalic and atraumatic.   Eyes:      General: No scleral icterus.     Extraocular Movements: Extraocular movements intact.   Cardiovascular:      Rate and Rhythm: Normal rate and regular rhythm.      Pulses: Normal pulses.      Heart sounds: Normal heart sounds. No murmur heard.  Pulmonary:      Effort: Pulmonary effort is normal. No respiratory distress.      Breath sounds: Normal breath sounds. No wheezing, rhonchi or rales.   Abdominal:      General: There is distension.      Tenderness: There is abdominal tenderness (Right upper quadrant). There is no guarding or rebound.   Musculoskeletal:         General: No swelling or tenderness.      Cervical back: Normal range of motion and neck supple.      Right lower leg: Edema present.      Left lower leg: Edema present.   Lymphadenopathy:      Cervical: No cervical adenopathy.   Skin:     Coloration: Skin is not jaundiced.      Findings: No erythema.   Neurological:      General: No focal deficit present.      Mental Status: He is alert and oriented to person, place, and time. Mental status is at baseline.      Cranial Nerves: No cranial nerve deficit.   Psychiatric:         Mood and Affect: Mood normal.         Behavior: Behavior normal.         Fluids    Intake/Output Summary (Last 24 hours) at 6/6/2024 1227  Last data filed at 6/6/2024 0600  Gross per 24 hour   Intake 1535 ml   Output 1025 ml   Net 510 ml       Laboratory  Recent Labs     06/04/24  0128 06/05/24  0315 06/06/24  0126   WBC 9.6 5.3 5.3   RBC 3.76* 3.13* 3.53*   HEMOGLOBIN 12.4* 10.1* 11.7*   HEMATOCRIT 38.0* 31.2* 34.8*   .1* 99.7* 98.6*   MCH 33.0 32.3 33.1*   MCHC 32.6 32.4 33.6   RDW 58.0* 55.3* 55.0*   PLATELETCT 120* 107* 118*   MPV 11.9 11.7 11.7      Recent Labs     06/04/24  0128 06/05/24  0315 06/06/24  0126   SODIUM 128* 129* 133*   POTASSIUM 4.0 3.9 4.0   CHLORIDE 96 97 99   CO2 21 20 19*   GLUCOSE 96 108* 93   BUN 29* 30* 21   CREATININE 1.96* 2.10* 1.18   CALCIUM 8.4 8.1* 8.4     Recent Labs     06/06/24  0126   INR 1.52*               Imaging  US-ABDOMEN LTD (SOFT TISSUE)   Final Result      Ascites is present throughout the abdomen without a focal area amenable to ultrasound-guided paracentesis at this time.      OR-UKHP-IXALIXB DUCTS   Final Result      Digitized intraoperative radiograph is submitted for review. This examination is not for diagnostic purpose but for guidance during a surgical procedure. Please see the patient's chart for full procedural details.         INTERPRETING LOCATION: [address], [place], [address]      DX-PORTABLE FLUOROSCOPY < 1 HOUR   Final Result      Portable fluoroscopy utilized for 0.7 seconds.         INTERPRETING LOCATION: [address], [place], [address]      US-RENAL   Final Result         1.  Normal renal ultrasound.   2.  Splenomegaly   3.  Abdominal ascites      US-LIVER AND VESSELS LTD (TIPS) (COMBO)   Final Result         1. Cirrhotic appearance of the liver.   2. Cholelithiasis. There is wall thickening of the gallbladder which may be seen with acute cholecystitis or could be related to the hepatic dysfunction.   3. Ascites.   4. The TIPS is not occluded.   5. Limited exam.      FI-MOPOLRR-E/O   Final Result      1.  Study is substantially degraded by motion artifacts.   2.  No intra or extrahepatic bile duct dilation. CBD diameter is 5 mm. The MRCP imaging is otherwise degraded and cannot accurately exclude choledocholithiasis although none is clearly visible.   3.  Gallbladder wall thickening and edema. Gallstones are present, better seen on comparison prior CT study. There is possible cholecystitis. This could be definitively confirmed with HIDA scan if indicated.   4.  2.5 x 1.4 cm lesion in pancreatic head.  This could be phlegmon or early pseudocyst. Recommend follow-up imaging to assess resolution and exclude a pancreas mass.   5.  Fat stranding around the pancreas head and region, recommend laboratory correlation for pancreatitis.   6.  2.6 cm irregular spiculated nodular lesion along the inferior aspect of gastric body. This has some calcifications within it as seen on the previous CT study. The lesion is indeterminate. Recommend PET CT workup.   7.  Wall thickening of the proximal duodenum which could be due to duodenitis or cholecystitis.   8.  Cirrhosis, splenomegaly. TIPS in place.   9.  Mild to moderate ascites.           Assessment/Plan  * Acute gallstone pancreatitis- (present on admission)  Assessment & Plan  -Inpatient Status Medical Floor  Clear liquid diet, n.p.o. at midnight pending GI intervention  Discussed with general surgery, felt patient would be very high risk for any type of surgical intervention and if there was evidence of gallbladder distention cholecystotomy tube recommended  Ultrasound liver and TIPS, no occlusion in the catheter, still concern for acute cholecystitis    6/4: Patient to have ERCP today, we appreciate further recommendations by GI.  Concern for cholecystitis, will continue antibiotics, follow cultures.    6/5: Lipase level greater than 3000 today we have started the patient on IV fluids.  I appreciate further recommendations by GI.    6/6: Pain has improved, lipase level has improved significantly as well.  We have discontinued IV fluids.  We appreciate further recommendations by GI.    BACILIO (acute kidney injury) (HCC)  Assessment & Plan  Nephrology has been consulted, we appreciate further recommendations.  Diuretics and lisinopril to be discontinued for now as per nephrology.  Continue to monitor closely.    6/6: Cr today is 1.18. We appreciate further recommendations by nephrology.    Hyponatremia  Assessment & Plan  In the setting of cirrhosis  monitor    Cirrhosis  "(HCC)  Assessment & Plan  Holding diuretics  GI following the patient, we appreciate further recommendations.    6/5: We have ordered paracentesis.  We appreciate further recommendations by GI.    6/6: Paracentesis was not able to be performed as there was no fluid collection that was amenable for paracentesis as per note from ultrasound.  We appreciate further recommendations by GI.  For now diuretics are on hold as per nephrology, also nephrology following the patient.    Jaundice  Assessment & Plan  Since current bilirubin 4, states he usually runs around 2 at his baseline  No evidence of choledocholithiasis on MRCP however study was very poor, cannot completely be excluded  Appreciate GI consultation    6/4: Gi following and patient to undergo ERCP today.    Thrombocytopenia (HCC)  Assessment & Plan  Platelets up to 121 today      Hereditary hemochromatosis (HCC)  Assessment & Plan  -He takes rifaximin  -Baseline MELD 17    Encounter for tobacco use cessation counseling  Assessment & Plan  As per previous hospitalist:  \"Patient smokes 5 cigarettes/ Day but did not smoke over the past 5 days since he is not feeling well. He desires to quit smoking and I encourage to consider to use these 5 days as being already quit. I offered Nicotine Patch and he does not feel needing at this time. I encourage him to continue efforts in quit smoking. Total amount time dedicated to tobacco smoking counseling was 5 min.\"         VTE prophylaxis: SCDs    I have performed a physical exam and reviewed and updated ROS and Plan today (6/6/2024). In review of yesterday's note (6/5/2024), there are no changes except as documented above.      I spend at least 51 minutes providing care for this patient.  This includes face-to-face interview, physical examination.  Review of lab work including CBC, CMP, and lipase.  Discussing with multidisciplinary team including case management, nursing staff and pharmacy.  Creating plan of care, " reviewing orders.

## 2024-06-06 NOTE — PROGRESS NOTES
BSSR received from day shift RN. Patient sitting in chair in no apparent distress with no complaints. A&O X4. Plan of care discussed with patient. Bed in low position with bed brakes applied and call light in reach. Patient states no needs at this time.     2000-Patient is requesting that IV fluids be stopped. Patient is feeling that he is getting to much fluid and Edema in lower extremities is bothering him.

## 2024-06-07 NOTE — PROGRESS NOTES
Bedside report received from night RN. Assumed care of patient. Daily plan of care discussed. Pt awake and alert, currently denies intolerable pain, denies worsening BLE swelling this AM. Pt states pain is adequately controlled under current regimen. 12 hour chart check complete. Hourly rounding in place.

## 2024-06-07 NOTE — CARE PLAN
Problem: Pain Management  Goal: Pain level will decrease to patient's comfort goal  Outcome: Progressing     Problem: Fluid Volume:  Goal: Will maintain balanced intake and output  Outcome: Progressing     Problem: Knowledge Deficit  Goal: Knowledge of disease process/condition, treatment plan, diagnostic tests, and medications will improve  Outcome: Progressing     The patient is Stable - Low risk of patient condition declining or worsening    Shift Goals  Clinical Goals: Maintain pain control, ensure pt remains free from intolerable pain or nausea throughout shift  Patient Goals: Maximize pain control and comfort, reduce BLE swelling  Family Goals: n/a    Progress made toward(s) clinical / shift goals:  Pain control maintained throughout shift; pt occasionally required IV pain medication for breakthrough pain. Pt without complaints of nausea. BLE swelling with no significant changes this shift.    Patient is not progressing towards the following goals: n/a

## 2024-06-07 NOTE — PROGRESS NOTES
Gastroenterology Progress Note     Author: Ryley Ortiz M.D.   Date & Time Created: 6/6/2024 5:32 PM    Chief Complaint:  Abdominal pain, suspected cholecystitis    Interval History:  Patient reports improved pain today. Still has a large amount of swelling in legs.     Review of Systems:  Review of Systems   Constitutional:  Negative for chills, fever and malaise/fatigue.   Respiratory:  Negative for cough and shortness of breath.    Cardiovascular:  Negative for chest pain and palpitations.   Gastrointestinal:  Negative for abdominal pain, blood in stool, constipation, diarrhea, heartburn, melena, nausea and vomiting.       Physical Exam:  Physical Exam  Constitutional:       General: He is not in acute distress.     Appearance: Normal appearance. He is obese. He is not ill-appearing.   Eyes:      General: Scleral icterus present.   Cardiovascular:      Rate and Rhythm: Normal rate and regular rhythm.      Heart sounds: No murmur heard.  Pulmonary:      Effort: Pulmonary effort is normal. No respiratory distress.      Breath sounds: Normal breath sounds.   Abdominal:      General: Abdomen is flat. Bowel sounds are normal. There is no distension.      Palpations: Abdomen is soft.      Tenderness: There is no abdominal tenderness.   Musculoskeletal:      Right lower leg: Edema present.      Left lower leg: Edema present.   Skin:     Coloration: Skin is jaundiced.   Neurological:      Mental Status: He is alert.         Labs:          Recent Labs     06/04/24 0128 06/05/24 0315 06/06/24 0126   SODIUM 128* 129* 133*   POTASSIUM 4.0 3.9 4.0   CHLORIDE 96 97 99   CO2 21 20 19*   BUN 29* 30* 21   CREATININE 1.96* 2.10* 1.18   CALCIUM 8.4 8.1* 8.4     Recent Labs     06/04/24 0128 06/05/24 0315 06/06/24  0126   ALTSGPT 24 19 22   ASTSGOT 49* 52* 55*   ALKPHOSPHAT 180* 133* 159*   TBILIRUBIN 4.2* 3.3* 3.3*   LIPASE  --  >3000* 127*   GLUCOSE 96 108* 93     Recent Labs     06/04/24 0128 06/05/24 0315  24  0126   RBC 3.76* 3.13* 3.53*   HEMOGLOBIN 12.4* 10.1* 11.7*   HEMATOCRIT 38.0* 31.2* 34.8*   PLATELETCT 120* 107* 118*   PROTHROMBTM  --   --  18.9*   INR  --   --  1.52*     Recent Labs     24  0128 24  0315 24  0126   WBC 9.6 5.3 5.3   ASTSGOT 49* 52* 55*   ALTSGPT 24    ALKPHOSPHAT 180* 133* 159*   TBILIRUBIN 4.2* 3.3* 3.3*     Hemodynamics:  Temp (24hrs), Av.7 °C (98.1 °F), Min:36.7 °C (98 °F), Max:36.8 °C (98.3 °F)  Temperature: 36.7 °C (98 °F)  Pulse  Av.3  Min: 56  Max: 100   Blood Pressure: 117/64     Respiratory:    Respiration: 16, Pulse Oximetry: 93 %     Work Of Breathing / Effort: Within Normal Limits     Fluids:    Intake/Output Summary (Last 24 hours) at 2024 1732  Last data filed at 2024 0600  Gross per 24 hour   Intake 1535 ml   Output 925 ml   Net 610 ml        GI/Nutrition:  Orders Placed This Encounter   Procedures    Diet Order Diet: 2 Gram Sodium     Standing Status:   Standing     Number of Occurrences:   1     Order Specific Question:   Diet:     Answer:   2 Gram Sodium [7]     Medical Decision Making, by Problem:  Active Hospital Problems    Diagnosis     *Acute gallstone pancreatitis [K85.10]     BACILIO (acute kidney injury) (HCC) [N17.9]     Cirrhosis (HCC) [K74.60]     Hyponatremia [E87.1]     Jaundice [R17]     Encounter for tobacco use cessation counseling [Z71.6]     Hereditary hemochromatosis (HCC) [E83.110]     Thrombocytopenia (HCC) [D69.6]        Plan:  54 yo male with ETOH cirrhosis and gallstone pancreatitis. He is not a surgical candidate due to high MELD. ERCP performed for sphincterotomy. Mild post-ERCP pancreatitis or flare of smoldering pancreatitis. Pain improved today  1) Agree with stopping IVF  2) Ok to treat with antibiotics for presumed cholecystitis  3) likely no need for Rifaximin at this point  4) restart diuretic therapy with Spironolactone 100mg and Furosemide 40mg when ok with Nephrology.   5) continue low sodium  diet  6) patient will need referral from his PCP to the [facility] in [place] when he returns home    Please call [facility] if we can be of further assistance    Quality-Core Measures

## 2024-06-07 NOTE — PROGRESS NOTES
BSSR received from day shift RN. Patient laying in bed in no apparent distress with no complaints. A&O X4. Plan of care discussed with patient. Legs still extremely swollen. Reminded patient to chart each void. Bed in low position with bed brakes applied and call light in reach. Patient states no needs at this time.

## 2024-06-07 NOTE — PROGRESS NOTES
"[facility] -  PROGRESS NOTE               Author: Hunter Chao M.D. Date & Time: 6/7/2024  2:48 PM     HPI:  53 y.o. male, with h/o HTN, Hereditary Hemochromatosis c/b Cirrhosis requiring TIPS, who presented as a Direct Admission from [facility] for Gallstone Pancreatitis on 6/1/2024 in setting of elevaed lipase and CT e/o pancreatitis. Per chart review, abd pain for 5 days prior to admission. Underwent US liver and MRCP (poor quality) upon admission and GI consulted who recommended EGD/EUS, ERCP with sphincterotomy (no Cholecystectomy due to MELD/surgical risk) which the patient underwent this AM. Currently recovering in the PACU. Creatinine 0.8 on 6/1 admission and climbed to 1.9 this AM prompting nephrology consult. No documented prolonged hypotension. Urine output not recorded, but notes decreased UOP yesterday. Renal US normal. Did get contrast on 6/1. Transiently on IVFs and got lisinopril earlier during the hospital stay. Notes ongoing abd pain/distended abdomen and leg swelling above normal. No chest pain or SOB.     DAILY NEPHROLOGY SUMMARY:  6/4: consult done  6/5: stable hemodynamics, UOP not recorded, urine sodium low, lipase increased to >3000, ongoing abd pain-IVF running   6/6: IVF off, abdominal pain persistent but tolerable, UOP 900ml so far today SCr down to 1.1, lipase 127, CO2 119, SNa 133.  VSS -120s.  No CP SOB, still with persistent bilat LE pitting edema.  Reports did not get paracentesis due to \"lack of enough pocket\"   6/7: No acute events, ongoing abd pain, some concern for ongoing cholecystitis, no chest pain or SOB, +leg swelling, igh PO intake    PAST FAMILY HISTORY: Reviewed and Unchanged  SOCIAL HISTORY: Reviewed and Unchanged  CURRENT MEDICATIONS: Reviewed  IMAGING STUDIES: Reviewed    ROS  10 point ROS performed, negative other than stated above     PHYSICAL EXAM  VS:  BP (!) 142/81 Comment: RN notified   Pulse 79   Temp 36.3 °C (97.4 °F) " "(Temporal)   Resp 18   Ht 1.905 m (6' 3\")   Wt 120 kg (264 lb 12.4 oz)   SpO2 95%   BMI 33.09 kg/m²   GENERAL: no acute distress  CV: RRR, 3+ pitting edema  RESP: non-labored, clear   GI: distended  MSK: No joint deformities   SKIN: No concerning rashes  NEURO: AOx3  PSYCH: Cooperative    Fluids:  In: 2750 [P.O.:2750]  Out: 1350     LABS:  Recent Results (from the past 24 hour(s))   Comp Metabolic Panel    Collection Time: 06/07/24  2:20 AM   Result Value Ref Range    Sodium 131 (L) 135 - 145 mmol/L    Potassium 4.2 3.6 - 5.5 mmol/L    Chloride 99 96 - 112 mmol/L    Co2 20 20 - 33 mmol/L    Anion Gap 12.0 7.0 - 16.0    Glucose 115 (H) 65 - 99 mg/dL    Bun 19 8 - 22 mg/dL    Creatinine 0.88 0.50 - 1.40 mg/dL    Calcium 8.2 (L) 8.4 - 10.2 mg/dL    Correct Calcium 9.0 8.5 - 10.5 mg/dL    AST(SGOT) 41 12 - 45 U/L    ALT(SGPT) 18 2 - 50 U/L    Alkaline Phosphatase 130 (H) 30 - 99 U/L    Total Bilirubin 4.1 (H) 0.1 - 1.5 mg/dL    Albumin 3.0 (L) 3.2 - 4.9 g/dL    Total Protein 5.1 (L) 6.0 - 8.2 g/dL    Globulin 2.1 1.9 - 3.5 g/dL    A-G Ratio 1.4 g/dL   CBC WITHOUT DIFFERENTIAL    Collection Time: 06/07/24  2:20 AM   Result Value Ref Range    WBC 5.4 4.8 - 10.8 K/uL    RBC 3.22 (L) 4.70 - 6.10 M/uL    Hemoglobin 10.7 (L) 14.0 - 18.0 g/dL    Hematocrit 31.3 (L) 42.0 - 52.0 %    MCV 97.2 81.4 - 97.8 fL    MCH 33.2 (H) 27.0 - 33.0 pg    MCHC 34.2 32.3 - 36.5 g/dL    RDW 54.6 (H) 35.9 - 50.0 fL    Platelet Count 100 (L) 164 - 446 K/uL    MPV 11.5 9.0 - 12.9 fL   IMMATURE PLT FRACTION    Collection Time: 06/07/24  2:20 AM   Result Value Ref Range    Imm. Plt Fraction 7.5 0.6 - 13.1 %   ESTIMATED GFR    Collection Time: 06/07/24  2:20 AM   Result Value Ref Range    GFR (CKD-EPI) 103 >60 mL/min/1.73 m 2       (click the triangle to expand results)      ASSESSMENT:  # BACILIO: Cr increased to 1.8. In setting of Pancreatitis, contrast, RAASi. Lower suspicion for HRS based on hospital course  -Cr normalized  # Pancreatitis: " concern for gallstone. S/p sphincterotomy   - Lipase now 127  # Cirrhosis: secondary to hereditary hemochromatosis   # Hx HTN  # Hyponatremia: in setting of cirrhosis   # Anemia     PLAN:  -No role for RRT  -Dose of lasix 40mg IV today and restart (home dose lasix) PO 40mg daily tomorrow. Can up titrate as needed for desired diuretic effect. If renal function worsens significantly or inflammatory physiology recurs will need to hold.  -Hold aldactone and lisinopril, can restart on discharge  -Elevate legs, compression stockings   1.5L fluid restriction  -Strict I/Os  -Daily labs  -Dose all meds per eGFR <60    Ok for discharge form renal standpoint. Nephrology will sign-off. Feel free to contact us going forward should any further questions arise.          Thank you,

## 2024-06-07 NOTE — PROGRESS NOTES
"Hospital Medicine Daily Progress Note    Date of Service  6/7/2024    Chief Complaint  Ryley Gaines is a 53 y.o. male admitted 6/1/2024 with upper quadrant abdominal pain    Hospital Course    \"Patient is a pleasant 53-year-old male who was a direct admission from [facility] for gallstone pancreatitis.  He has been having right upper quadrant pain for the last 5 days and has had multiple MRCP's in the past.  He states that he has been told that he has not had a stone in the common bile duct but stones are present in the gallbladder.  MRCP currently pending and will get the appropriate consultation thereafter whether it be general surgery for the addition of GI depending on if there is a CBD stone.  Patient has underlying hereditary hemochromatosis and is on rifaximin.  \"    Interval Problem Update  6/2 patient seen this morning and complains of abdominal pain right upper quadrant however was feeling markedly better after pain medication.  No negative Hernandez's at time of my examination.  I think he has had festering cholecystitis and the question is whether or not he has a common bile duct stone with a bilirubin of 4.5.  Lipase is slightly elevated to 49.  Will continue with pain management and will get appropriate consultation after MRCP is resulted.    6/3 patient states he feels the same today as he did yesterday.  The ultrasound was somewhat uncomfortable.  He is still receiving pain medication and is doing better today than compared to yesterday as far as pain control.  TIPS is not obstructed on ultrasound.  Gastroenterology to consult and recommendations forthcoming.    PATIENT SEEN BY PREVIOUS HOSPITALIST UNTIL 6/3    6/4: Patient had elevated creatinine today, we have consulted nephrology.  As per the recommendation we have stopped lisinopril and diuretics for now.  Patient to undergo ERCP today by GI, we appreciate further recommendations.  Continue to monitor closely.    6/5: Patient seen at bedside this " morning.  Patient laying in bed, lipase level greater than 3000.  We started the patient on IV fluids.  I discussed case with GI for now we will continue IV fluids and we will order paracentesis.  Creatinine is 2.1.  Nephrology following the patient, we appreciate further recommendations.    6/6: Patient seen at bedside this morning.  Abdominal pain has improved, lipase level has improved.  We have discontinued IV fluids.  Creatinine also has improved to 1.18.  Nephrology and GI following the patient, we appreciate further recommendations.  Diuretics still on hold as per nephrology.  They were unable to perform paracentesis as per ultrasound report.    6/7: Patient seen at bedside this morning.  There is concern for ongoing cholecystitis for which the patient is currently taking antibiotics.  I discussed case with GI and they would like general surgery's input.  We have consulted general surgery to see if the patient would be a candidate for cholecystectomy, however due to ongoing cirrhosis high MELD score this could be a barrier for surgery at this time.  I have also discussed with nephrology, we appreciate their recommendations as to when we can restart the patient's Lasix and spironolactone.    I have discussed this patient's plan of care and discharge plan at IDT rounds today with Case Management, Nursing, Nursing leadership, and other members of the IDT team.    Consultants/Specialty  GI  Nephrology    Code Status  Full Code    Disposition  The patient is not medically cleared for discharge to home or a post-acute facility.        Review of Systems  Review of Systems   Constitutional:  Negative for chills and fever.   HENT:  Negative for congestion.    Eyes:  Negative for blurred vision and photophobia.   Respiratory:  Negative for cough and shortness of breath.    Cardiovascular:  Negative for chest pain, claudication and leg swelling.   Gastrointestinal:  Positive for abdominal pain. Negative for  constipation, diarrhea, heartburn, nausea and vomiting.   Genitourinary:  Negative for dysuria and hematuria.   Musculoskeletal:  Negative for joint pain and myalgias.   Skin:  Negative for itching and rash.   Neurological:  Negative for dizziness, sensory change, speech change, weakness and headaches.   Psychiatric/Behavioral:  Negative for depression. The patient is not nervous/anxious and does not have insomnia.         Physical Exam  Temp:  [36.3 °C (97.4 °F)-36.8 °C (98.3 °F)] 36.3 °C (97.4 °F)  Pulse:  [] 79  Resp:  [15-18] 18  BP: (123-142)/(67-88) 142/81  SpO2:  [93 %-96 %] 95 %    Physical Exam  Vitals and nursing note reviewed.   Constitutional:       General: He is not in acute distress.     Appearance: He is obese. He is ill-appearing.   HENT:      Head: Normocephalic and atraumatic.   Eyes:      General: No scleral icterus.     Extraocular Movements: Extraocular movements intact.   Cardiovascular:      Rate and Rhythm: Normal rate and regular rhythm.      Pulses: Normal pulses.      Heart sounds: Normal heart sounds. No murmur heard.  Pulmonary:      Effort: Pulmonary effort is normal. No respiratory distress.      Breath sounds: Normal breath sounds. No wheezing, rhonchi or rales.   Abdominal:      General: There is distension.      Tenderness: There is abdominal tenderness (Right upper quadrant). There is no guarding or rebound.   Musculoskeletal:         General: No swelling or tenderness.      Cervical back: Normal range of motion and neck supple.      Right lower leg: Edema present.      Left lower leg: Edema present.   Lymphadenopathy:      Cervical: No cervical adenopathy.   Skin:     Coloration: Skin is not jaundiced.      Findings: No erythema.   Neurological:      General: No focal deficit present.      Mental Status: He is alert and oriented to person, place, and time. Mental status is at baseline.      Cranial Nerves: No cranial nerve deficit.   Psychiatric:         Mood and Affect:  Mood normal.         Behavior: Behavior normal.         Fluids    Intake/Output Summary (Last 24 hours) at 6/7/2024 1057  Last data filed at 6/7/2024 0600  Gross per 24 hour   Intake 2750 ml   Output 1350 ml   Net 1400 ml       Laboratory  Recent Labs     06/05/24  0315 06/06/24  0126 06/07/24  0220   WBC 5.3 5.3 5.4   RBC 3.13* 3.53* 3.22*   HEMOGLOBIN 10.1* 11.7* 10.7*   HEMATOCRIT 31.2* 34.8* 31.3*   MCV 99.7* 98.6* 97.2   MCH 32.3 33.1* 33.2*   MCHC 32.4 33.6 34.2   RDW 55.3* 55.0* 54.6*   PLATELETCT 107* 118* 100*   MPV 11.7 11.7 11.5     Recent Labs     06/05/24  0315 06/06/24  0126 06/07/24  0220   SODIUM 129* 133* 131*   POTASSIUM 3.9 4.0 4.2   CHLORIDE 97 99 99   CO2 20 19* 20   GLUCOSE 108* 93 115*   BUN 30* 21 19   CREATININE 2.10* 1.18 0.88   CALCIUM 8.1* 8.4 8.2*     Recent Labs     06/06/24  0126   INR 1.52*               Imaging  US-ABDOMEN LTD (SOFT TISSUE)   Final Result      Ascites is present throughout the abdomen without a focal area amenable to ultrasound-guided paracentesis at this time.      [id number] DUCTS   Final Result      Digitized intraoperative radiograph is submitted for review. This examination is not for diagnostic purpose but for guidance during a surgical procedure. Please see the patient's chart for full procedural details.         INTERPRETING LOCATION: [facility] [facility], [address]      DX-PORTABLE FLUOROSCOPY < 1 HOUR   Final Result      Portable fluoroscopy utilized for 0.7 seconds.         INTERPRETING LOCATION: [address] [facility], [address]      US-RENAL   Final Result         1.  Normal renal ultrasound.   2.  Splenomegaly   3.  Abdominal ascites      US-LIVER AND VESSELS LTD (TIPS) (COMBO)   Final Result         1. Cirrhotic appearance of the liver.   2. Cholelithiasis. There is wall thickening of the gallbladder which may be seen with acute cholecystitis or could be related to the hepatic dysfunction.   3. Ascites.   4. The TIPS is not occluded.   5. Limited exam.       [id number]/O   Final Result      1.  Study is substantially degraded by motion artifacts.   2.  No intra or extrahepatic bile duct dilation. CBD diameter is 5 mm. The MRCP imaging is otherwise degraded and cannot accurately exclude choledocholithiasis although none is clearly visible.   3.  Gallbladder wall thickening and edema. Gallstones are present, better seen on comparison prior CT study. There is possible cholecystitis. This could be definitively confirmed with HIDA scan if indicated.   4.  2.5 x 1.4 cm lesion in pancreatic head. This could be phlegmon or early pseudocyst. Recommend follow-up imaging to assess resolution and exclude a pancreas mass.   5.  Fat stranding around the pancreas head and region, recommend laboratory correlation for pancreatitis.   6.  2.6 cm irregular spiculated nodular lesion along the inferior aspect of gastric body. This has some calcifications within it as seen on the previous CT study. The lesion is indeterminate. Recommend PET CT workup.   7.  Wall thickening of the proximal duodenum which could be due to duodenitis or cholecystitis.   8.  Cirrhosis, splenomegaly. TIPS in place.   9.  Mild to moderate ascites.           Assessment/Plan  * Acute gallstone pancreatitis- (present on admission)  Assessment & Plan  -Inpatient Status Medical Floor  Clear liquid diet, n.p.o. at midnight pending GI intervention  Discussed with general surgery, felt patient would be very high risk for any type of surgical intervention and if there was evidence of gallbladder distention cholecystotomy tube recommended  Ultrasound liver and TIPS, no occlusion in the catheter, still concern for acute cholecystitis    6/4: Patient to have ERCP today, we appreciate further recommendations by GI.  Concern for cholecystitis, will continue antibiotics, follow cultures.    6/5: Lipase level greater than 3000 today we have started the patient on IV fluids.  I appreciate further recommendations by GI.    6/6:  "Pain has improved, lipase level has improved significantly as well.  We have discontinued IV fluids.  We appreciate further recommendations by GI.    6/7: Patient not complaining of pain today, we have consulted General Surgery, we appreciate further recommendations.    BACILIO (acute kidney injury) (HCC)  Assessment & Plan  Nephrology has been consulted, we appreciate further recommendations.  Diuretics and lisinopril to be discontinued for now as per nephrology.  Continue to monitor closely.    6/6: Cr today is 1.18. We appreciate further recommendations by nephrology.    6/7: Cr today is .88.  Nephrology to evaluate when can he restart his diuretics.    Hyponatremia  Assessment & Plan  In the setting of cirrhosis  monitor    Cirrhosis (HCC)  Assessment & Plan  Holding diuretics  GI following the patient, we appreciate further recommendations.    6/5: We have ordered paracentesis.  We appreciate further recommendations by GI.    6/6: Paracentesis was not able to be performed as there was no fluid collection that was amenable for paracentesis as per note from ultrasound.  We appreciate further recommendations by GI.  For now diuretics are on hold as per nephrology, also nephrology following the patient.    6/7: Nephrology to evaluate today 1, will restart the patient's diuretics.  Continue to monitor closely, we appreciate further recommendations by GI.    Jaundice  Assessment & Plan  Since current bilirubin 4, states he usually runs around 2 at his baseline  No evidence of choledocholithiasis on MRCP however study was very poor, cannot completely be excluded  Appreciate GI consultation    6/4: Gi following and patient to undergo ERCP today.    Thrombocytopenia (HCC)  Assessment & Plan  Platelets up to 121 today      Hereditary hemochromatosis (HCC)  Assessment & Plan  -He takes rifaximin  -Baseline MELD 17    Encounter for tobacco use cessation counseling  Assessment & Plan  As per previous hospitalist:  \"Patient " "smokes 5 cigarettes/ Day but did not smoke over the past 5 days since he is not feeling well. He desires to quit smoking and I encourage to consider to use these 5 days as being already quit. I offered Nicotine Patch and he does not feel needing at this time. I encourage him to continue efforts in quit smoking. Total amount time dedicated to tobacco smoking counseling was 5 min.\"         VTE prophylaxis: SCDs    I have performed a physical exam and reviewed and updated ROS and Plan today (6/7/2024). In review of yesterday's note (6/6/2024), there are no changes except as documented above.      I spend at least 52 minutes providing care for this patient.  This includes face-to-face interview, physical examination.  Review of lab work including CBC, and CMP.  Discussing with nephrology plan of care.  Discussing with multidisciplinary team including case management, nursing staff and pharmacy.  Creating plan of care, reviewing orders.  "

## 2024-06-07 NOTE — CARE PLAN
The patient is Stable - Low risk of patient condition declining or worsening    Shift Goals  Clinical Goals: Control pain at 4/10 or less post intervention  Patient Goals: Sleep/rest overnight  Family Goals: n/a    Progress made toward(s) clinical / shift goals:  Patient is requiring both oral and IV medication in order to control the pain.    Patient is not progressing towards the following goals:      Problem: Pain Management  Goal: Pain level will decrease to patient's comfort goal  Outcome: Not Progressing

## 2024-06-08 NOTE — CARE PLAN
The patient is Stable - Low risk of patient condition declining or worsening    Shift Goals  Clinical Goals: Patients pain controlled to 7/10 or less pain and remain free from falls throughout shift.  Patient Goals: keep pain controlled  Family Goals: n/a    Progress made toward(s) clinical / shift goals:  Patient remained free from falls with fall precautions including pt education, call light within reach, bed alarm activated.    Patient is not progressing towards the following goals:

## 2024-06-08 NOTE — PROGRESS NOTES
Report received from Onofre CHRISTIANSON, assumed care of pt at 1915.   POC and medications reviewed with pt. Pt verbalized understanding.   AOx4  C/o 6/10 abdomen pain at this time.  Denies SOB, or dizziness at this time.   Safety measures in place.  Hourly rounding in place.

## 2024-06-08 NOTE — PROGRESS NOTES
Bedside report received from night RN. Assumed care of patient. Daily plan of care discussed. Pt awake and alert, requesting additional dose of pain medication for BLE pain r/t swelling. Will medicate per MAR as appropriate. 12 hour chart check complete. Hourly rounding in place.

## 2024-06-08 NOTE — DISCHARGE SUMMARY
"Discharge Summary    CHIEF COMPLAINT ON ADMISSION  No chief complaint on file.      Reason for Admission  Gall Stone Pancreatitis     Admission Date  6/1/2024    CODE STATUS  Full Code    HPI & HOSPITAL COURSE  As per chart review:  \"Patient is a pleasant 53-year-old male who was a direct admission from [facility] for gallstone pancreatitis.  He has been having right upper quadrant pain for the last 5 days and has had multiple MRCP's in the past.  He states that he has been told that he has not had a stone in the common bile duct but stones are present in the gallbladder.  MRCP currently pending and will get the appropriate consultation thereafter whether it be general surgery for the addition of GI depending on if there is a CBD stone.  Patient has underlying hereditary hemochromatosis and is on rifaximin.  \"    Patient was admitted for further management and care.  GI was consulted.  Patient underwent EUS, ERCP with sphincterectomy.  GI recommended consulting surgery, however due to high MELD score current  hepatic clinical status, general surgery evaluated the patient and did not recommend surgical intervention at this time.  They will follow-up with him as an outpatient.    It seems after the ERCP the patient developed post ERCP pancreatitis as the patient's lipase again increased greater than 3000 and he was complaining of abdominal pain.  Will start the patient on IV fluids, however it seems that it quickly resolved as the patient's pain improved significantly.  And lipase improved as well.    The patient aminal pain improved while hospitalized.    While hospitalized the patient developed BACILIO, nephrology was consulted.  However the patient did receive trial of albumin and it seems that the BACILIO improved.  Today on the day of discharge the patient's creatinine is 0.76.  Also GI had recommended paracentesis, however they were unable to perform paracentesis as per the ultrasound they were unable to find a " "proper fluid pocket to drain.    Due to the fact that the kidney function has improved.  As per nephrology the patient can restart his diuretics.    The patient did develop lower extremity edema, this is most likely due to the patient's ongoing cirrhosis and the fact that he was off his diuretics when he had BACILIO.    As per GI the patient will require referral to a liver transplant center.  I discussed this with GI and either GI or the PCP will place referral to liver transplant center.  I did place referral to gastroenterology and under comment section I placed [facility] in [place].    Overall the patient feels much better and would like to go home.  As per GI and general surgery the patient can be discharged home as well as per nephrology.  The patient require close follow-up with PCP, GI, general surgery and nephrology as an outpatient.    There was also concern of cholecystitis, general surgery recommends not surgical intervention but discharging the patient on antibiotics.  Will discharge patient on Augmentin.    On MRI it reported: \"2.6 cm irregular spiculated nodular lesion along the inferior aspect of gastric body. This has some calcifications within it as seen on the previous CT study. The lesion is indeterminate.\"  It seems it has been seen in the previous study.  I discussed this with GI on call today (Dr Henry), and they will follow up as outpatient. At this time they do not want further testing including tumor markers, It seems they might do EGD as outpatient.      I discussed case with general surgery will discharge the     Therefore, he is discharged in fair and stable condition to home with close outpatient follow-up.    The patient met 2-midnight criteria for an inpatient stay at the time of discharge.    Discharge Date  06/08/2024    FOLLOW UP ITEMS POST DISCHARGE  The patient will complete antibiotic treatment as an outpatient.  The patient require close follow-up with " PCP, GI, general surgery and nephrology as an outpatient.    DISCHARGE DIAGNOSES  Principal Problem:    Acute gallstone pancreatitis (POA: Yes)  Active Problems:    BACILIO (acute kidney injury) (HCC) (POA: Unknown)    Encounter for tobacco use cessation counseling (POA: Unknown)    Hereditary hemochromatosis (HCC) (POA: Unknown)    Thrombocytopenia (HCC) (POA: Unknown)    Jaundice (POA: Unknown)    Cirrhosis (HCC) (POA: Unknown)    Hyponatremia (POA: Unknown)  Resolved Problems:    Gallstone pancreatitis (POA: Yes)      FOLLOW UP    SONNY Lazaro  [address] [facility] [address]  [phone]    Schedule an appointment as soon as possible for a visit      Uvaldo Gibbons M.D.  [address]  [address]  [place] [id number] [phone]    Schedule an appointment as soon as possible for a visit      Ryley Ortiz M.D.  [address]  [place] [address] [phone]    Follow up      Hunter Chao M.D.  [address]   [place] [id number] [phone]    Schedule an appointment as soon as possible for a visit        MEDICATIONS ON DISCHARGE     Medication List        START taking these medications        Instructions   amoxicillin-clavulanate 875-125 MG Tabs  Commonly known as: Augmentin   Take 1 Tablet by mouth every 12 hours for 4 days.  Dose: 1 Tablet     oxyCODONE immediate-release 5 MG Tabs  Commonly known as: Roxicodone   Take 1 Tablet by mouth every 12 hours as needed for Severe Pain for up to 4 days.  Dose: 5 mg     polyethylene glycol/lytes Pack  Commonly known as: Miralax   Take 1 Packet by mouth 1 time a day as needed (if sennosides and docusate ineffective after 24 hours).  Dose: 17 g     senna-docusate 8.6-50 MG Tabs  Commonly known as: Pericolace Or Senokot S   Take 2 Tablets by mouth 2 times a day.  Dose: 2 Tablet            CONTINUE taking these medications        Instructions   gabapentin 300 MG Caps  Commonly known as: Neurontin   Take 300 mg by mouth 2 times a day.  Dose: 300 mg     Lasix  40 MG Tabs  Generic drug: furosemide   Take 40 mg by mouth 1 time a day as needed. Indications: Edema  Dose: 40 mg     lisinopril 10 MG Tabs  Commonly known as: Prinivil   Take 10 mg by mouth every day.  Dose: 10 mg     multivitamin Tabs   Take 1 Tablet by mouth every day.  Dose: 1 Tablet     omeprazole 40 MG delayed-release capsule  Commonly known as: PriLOSEC   Take 40 mg by mouth every day.  Dose: 40 mg     ondansetron 4 MG Tbdp  Commonly known as: Zofran ODT   Take 4 mg by mouth every 6 hours as needed for Nausea/Vomiting.  Dose: 4 mg     riFAXIMin 550 MG Tabs tablet  Commonly known as: Xifaxan   Take 500 mg by mouth every day. No known stop date  Dose: 500 mg     spironolactone 25 MG Tabs  Commonly known as: Aldactone   Take 25 mg by mouth every day.  Dose: 25 mg     traZODone 50 MG Tabs  Commonly known as: Desyrel   Take 50 mg by mouth every evening.  Dose: 50 mg            STOP taking these medications      ciprofloxacin 500 MG Tabs  Commonly known as: Cipro     ibuprofen 200 MG Tabs  Commonly known as: Motrin              Allergies  No Known Allergies    DIET  Orders Placed This Encounter   Procedures    Diet Order Diet: 2 Gram Sodium; Fluid modifications: (optional): 1500 ml Fluid Restriction     Standing Status:   Standing     Number of Occurrences:   1     Order Specific Question:   Diet:     Answer:   2 Gram Sodium [7]     Order Specific Question:   Fluid modifications: (optional)     Answer:   1500 ml Fluid Restriction [9]       ACTIVITY  As tolerated.  Weight bearing as tolerated    CONSULTATIONS  GI  General Surgery  Nephrology    PROCEDURES  PROCEDURE PERFORMED: EGD/EUS, ERCP with sphincterotomy       US-EXTREMITY VENOUS LOWER BILAT   Final Result         1. No evidence of bilateral lower extremity deep venous thrombosis.      US-ABDOMEN LTD (SOFT TISSUE)   Final Result      Ascites is present throughout the abdomen without a focal area amenable to ultrasound-guided paracentesis at this time.       [id number] DUCTS   Final Result      Digitized intraoperative radiograph is submitted for review. This examination is not for diagnostic purpose but for guidance during a surgical procedure. Please see the patient's chart for full procedural details.         INTERPRETING LOCATION: [address], [place], [address]      DX-PORTABLE FLUOROSCOPY < 1 HOUR   Final Result      Portable fluoroscopy utilized for 0.7 seconds.         INTERPRETING LOCATION: [address], [place], [address]      US-RENAL   Final Result         1.  Normal renal ultrasound.   2.  Splenomegaly   3.  Abdominal ascites      US-LIVER AND VESSELS LTD (TIPS) (COMBO)   Final Result         1. Cirrhotic appearance of the liver.   2. Cholelithiasis. There is wall thickening of the gallbladder which may be seen with acute cholecystitis or could be related to the hepatic dysfunction.   3. Ascites.   4. The TIPS is not occluded.   5. Limited exam.      [id number]   Final Result      1.  Study is substantially degraded by motion artifacts.   2.  No intra or extrahepatic bile duct dilation. CBD diameter is 5 mm. The MRCP imaging is otherwise degraded and cannot accurately exclude choledocholithiasis although none is clearly visible.   3.  Gallbladder wall thickening and edema. Gallstones are present, better seen on comparison prior CT study. There is possible cholecystitis. This could be definitively confirmed with HIDA scan if indicated.   4.  2.5 x 1.4 cm lesion in pancreatic head. This could be phlegmon or early pseudocyst. Recommend follow-up imaging to assess resolution and exclude a pancreas mass.   5.  Fat stranding around the pancreas head and region, recommend laboratory correlation for pancreatitis.   6.  2.6 cm irregular spiculated nodular lesion along the inferior aspect of gastric body. This has some calcifications within it as seen on the previous CT study. The lesion is indeterminate. Recommend PET CT workup.   7.  Wall thickening of the  proximal duodenum which could be due to duodenitis or cholecystitis.   8.  Cirrhosis, splenomegaly. TIPS in place.   9.  Mild to moderate ascites.           LABORATORY  Lab Results   Component Value Date    SODIUM 130 (L) 06/08/2024    POTASSIUM 4.2 06/08/2024    CHLORIDE 98 06/08/2024    CO2 20 06/08/2024    GLUCOSE 115 (H) 06/08/2024    BUN 16 06/08/2024    CREATININE 0.76 06/08/2024        Lab Results   Component Value Date    WBC 5.8 06/08/2024    HEMOGLOBIN 11.1 (L) 06/08/2024    HEMATOCRIT 33.1 (L) 06/08/2024    PLATELETCT 105 (L) 06/08/2024        Total time of the discharge process exceeds 31 minutes.

## 2024-06-08 NOTE — CONSULTS
CONSULT NOTE  06/07/24  Consulting Physician: Dr. Hutchison    PATIENT ID  Name:             Ryley Gaines     YOB: 1970  Age:                 53 y.o.  male   MRN:               [id number]    CHIEF COMPLAINT:        Gallstone pancreatitis significant cirrhosis    HISTORY OF PRESENT ILLNESS:  This is a 54-year-old gentleman with significant cirrhosis who presented with gallstone pancreatitis status post ERCP.  General surgery is consulted to discuss possible cholecystectomy in the setting of gallstone pancreatitis and cirrhosis.    REVIEW OF SYSTEMS:   Constitutional: Denies unintended weight change, night sweats, fatigue  Eyes:   Denies eye pain, redness, discharge, vision changes  Ears/Nose/Throat/Mouth: Denies hearing changes, ear pain, nasal congestion, sore throat, dysphagia  Cardiovascular:  Denies Chest pain, shortness of breath, orthopnea, palpitations, claudication  Respiratory:  Denies cough, sputum, wheezing, dyspnea  Gastrointestinal/Hepatic:  Denies dysphagia, melena, jaundice, hematochezia, changing heartburn  Genitourinary:  Denies dysuria, increased frequency, hematuria, urgency  Musculoskeletal/Rheum: Denies changing arthralgias, myalgias, joint swelling, joint stiffness  Skin/Breast: Denies changing skin lesions, pruritis, nipple discharge, hair changes  Neurological: Denies weakness, numbness, paresthesia, syncope, dizziness  Pyschiatric: Denies acute depression, anxiety, insomnia, personality changes, delusions  Endocrine: Denies temperature intolerance, polydipsia, polyuria  Heme/Oncology/Lymph Nodes: Denies easy bruising, bleeding, lymphadenopathy  All other systems were reviewed and are negative                 Past Medical History:   History reviewed. No pertinent past medical history.    Past Surgical History:  Past Surgical History:   Procedure Laterality Date    IN ERCP,DIAGNOSTIC N/A 6/4/2024    Procedure: ERCP, DIAGNOSTIC;  Surgeon: Ryley Ortiz M.D.;  Location:  SURGERY [facility];  Service: Gastroenterology    EGD W/ENDOSCOPIC ULTRASOUND N/A 6/4/2024    Procedure: EGD, WITH ENDOSCOPIC US;  Surgeon: Ryley Ortiz M.D.;  Location: [facility];  Service: Gastroenterology       Current Outpatient Medications:  No current facility-administered medications on file prior to encounter.     Current Outpatient Medications on File Prior to Encounter   Medication Sig Dispense Refill    spironolactone (ALDACTONE) 25 MG Tab Take 25 mg by mouth every day.      ondansetron (ZOFRAN ODT) 4 MG TABLET DISPERSIBLE Take 4 mg by mouth every 6 hours as needed for Nausea/Vomiting.      ibuprofen (MOTRIN) 200 MG Tab Take 200 mg by mouth every 6 hours as needed for Mild Pain.      riFAXIMin (XIFAXAN) 550 MG Tab tablet Take 500 mg by mouth every day. No known stop date      furosemide (LASIX) 40 MG Tab Take 40 mg by mouth 1 time a day as needed. Indications: Edema      omeprazole (PRILOSEC) 40 MG delayed-release capsule Take 40 mg by mouth every day.      traZODone (DESYREL) 50 MG Tab Take 50 mg by mouth every evening.      gabapentin (NEURONTIN) 300 MG Cap Take 300 mg by mouth 2 times a day.      multivitamin Tab Take 1 Tablet by mouth every day.      lisinopril (PRINIVIL) 10 MG Tab Take 10 mg by mouth every day.      ciprofloxacin (CIPRO) 500 MG Tab Take 500 mg by mouth every day. No stop date         Current Inpatient Medications:   Current Facility-Administered Medications   Medication Last Admin    [START ON 6/8/2024] furosemide (Lasix) tablet 40 mg      amoxicillin-clavulanate (Augmentin) 875-125 MG per tablet 1 Tablet 1 Tablet at 06/07/24 1803    riFAXIMin (Xifaxan) tablet 550 mg 550 mg at 06/07/24 1803    oxyCODONE immediate release (Roxicodone) tablet 10 mg 10 mg at 06/07/24 1806    oxyCODONE immediate-release (Roxicodone) tablet 5 mg      HYDROmorphone (Dilaudid) injection 1 mg 1 mg at 06/07/24 1501    gabapentin (Neurontin) capsule 300 mg 300 mg at 06/07/24 1803     traZODone (Desyrel) tablet 50 mg 50 mg at 06/06/24 2134    omeprazole (PriLOSEC) capsule 20 mg 20 mg at 06/07/24 0611    acetaminophen (Tylenol) tablet 650 mg      senna-docusate (Pericolace Or Senokot S) 8.6-50 MG per tablet 2 Tablet 2 Tablet at 06/07/24 0611    And    polyethylene glycol/lytes (Miralax) Packet 1 Packet      ondansetron (Zofran) syringe/vial injection 4 mg 4 mg at 06/06/24 1432    ondansetron (Zofran ODT) dispertab 4 mg      promethazine (Phenergan) tablet 12.5-25 mg      promethazine (Phenergan) suppository 12.5-25 mg      prochlorperazine (Compazine) injection 5-10 mg         Medication Allergy/Sensitivities:  No Known Allergies    Family History:  History reviewed. No pertinent family history.  Denies family history of bleeding disorders or reactions to anesthesia    Social History:  PCP: SONNY Lazaro  Social History     Tobacco Use   Smoking Status Not on file   Smokeless Tobacco Not on file     Social History     Substance and Sexual Activity   Alcohol Use None     Social History     Substance and Sexual Activity   Drug Use Not on file       PHYSICAL EXAM:  Weight/BMI: Body mass index is 33.09 kg/m².  Vitals:    06/06/24 2158 06/07/24 0437 06/07/24 1006 06/07/24 1610   BP: (!) 140/73 123/67 (!) 142/81 138/79   Pulse: (!) 102 88 79 82   Resp: 18 18 18 18   Temp: 36.6 °C (97.9 °F) 36.7 °C (98.1 °F) 36.3 °C (97.4 °F) 36.2 °C (97.2 °F)   TempSrc: Oral Oral Temporal Temporal   SpO2: 93% 96% 95% 98%   Weight:       Height:         Oxygen Therapy:  Pulse Oximetry: 98 %, O2 (LPM): 0, O2 Delivery Device: None - Room Air    Constitutional: Well developed, Well nourished, No acute distress, Non-toxic appearance.    HENMT: Normocephalic, Atraumatic, Bilateral external ears normal, Oropharynx moist mucous membranes, No oral exudates, Nose normal.  No thyromegaly.   Eyes: PERRLA, EOMI, Conjunctiva normal, No discharge.   Neck: Normal range of motion, No cervical tenderness, Supple, No stridor, no  JVD.  Cardiovascular: Normal heart rate, Normal rhythm, No murmurs, No rubs, No gallops.   Extremites with intact distal pulses, no cyanosis, clubbing or edema.   Lungs:  Respiratory effort is normal. Normal breath sounds, breath sounds clear to auscultation bilaterally,  no rales, no rhonchi, no wheezing.   Abdomen: Bowel sounds normal, Soft, No tenderness, No guarding, No rebound, No masses, No hepatosplenomegaly.    Skin: Warm, Dry, No erythema, No rash, no induration or crepitus.        Neurologic: Alert & oriented x 3, Normal motor function, Normal sensory function, No focal deficits noted, cranial nerves II through XII are normal.  Psychiatric: Affect normal, Judgment normal, Mood normal.     LAB DATA REVIEWED:  Recent Results (from the past 24 hour(s))   Comp Metabolic Panel    Collection Time: 06/07/24  2:20 AM   Result Value Ref Range    Sodium 131 (L) 135 - 145 mmol/L    Potassium 4.2 3.6 - 5.5 mmol/L    Chloride 99 96 - 112 mmol/L    Co2 20 20 - 33 mmol/L    Anion Gap 12.0 7.0 - 16.0    Glucose 115 (H) 65 - 99 mg/dL    Bun 19 8 - 22 mg/dL    Creatinine 0.88 0.50 - 1.40 mg/dL    Calcium 8.2 (L) 8.4 - 10.2 mg/dL    Correct Calcium 9.0 8.5 - 10.5 mg/dL    AST(SGOT) 41 12 - 45 U/L    ALT(SGPT) 18 2 - 50 U/L    Alkaline Phosphatase 130 (H) 30 - 99 U/L    Total Bilirubin 4.1 (H) 0.1 - 1.5 mg/dL    Albumin 3.0 (L) 3.2 - 4.9 g/dL    Total Protein 5.1 (L) 6.0 - 8.2 g/dL    Globulin 2.1 1.9 - 3.5 g/dL    A-G Ratio 1.4 g/dL   CBC WITHOUT DIFFERENTIAL    Collection Time: 06/07/24  2:20 AM   Result Value Ref Range    WBC 5.4 4.8 - 10.8 K/uL    RBC 3.22 (L) 4.70 - 6.10 M/uL    Hemoglobin 10.7 (L) 14.0 - 18.0 g/dL    Hematocrit 31.3 (L) 42.0 - 52.0 %    MCV 97.2 81.4 - 97.8 fL    MCH 33.2 (H) 27.0 - 33.0 pg    MCHC 34.2 32.3 - 36.5 g/dL    RDW 54.6 (H) 35.9 - 50.0 fL    Platelet Count 100 (L) 164 - 446 K/uL    MPV 11.5 9.0 - 12.9 fL   IMMATURE PLT FRACTION    Collection Time: 06/07/24  2:20 AM   Result Value Ref Range     Imm. Plt Fraction 7.5 0.6 - 13.1 %   ESTIMATED GFR    Collection Time: 06/07/24  2:20 AM   Result Value Ref Range    GFR (CKD-EPI) 103 >60 mL/min/1.73 m 2       IMAGING:   Images Independently Reviewed   US-ABDOMEN LTD (SOFT TISSUE)   Final Result      Ascites is present throughout the abdomen without a focal area amenable to ultrasound-guided paracentesis at this time.      [id number] DUCTS   Final Result      Digitized intraoperative radiograph is submitted for review. This examination is not for diagnostic purpose but for guidance during a surgical procedure. Please see the patient's chart for full procedural details.         INTERPRETING LOCATION: [address], [place], [address]      DX-PORTABLE FLUOROSCOPY < 1 HOUR   Final Result      Portable fluoroscopy utilized for 0.7 seconds.         INTERPRETING LOCATION: [address], [place], [address]      US-RENAL   Final Result         1.  Normal renal ultrasound.   2.  Splenomegaly   3.  Abdominal ascites      US-LIVER AND VESSELS LTD (TIPS) (COMBO)   Final Result         1. Cirrhotic appearance of the liver.   2. Cholelithiasis. There is wall thickening of the gallbladder which may be seen with acute cholecystitis or could be related to the hepatic dysfunction.   3. Ascites.   4. The TIPS is not occluded.   5. Limited exam.      [id number]   Final Result      1.  Study is substantially degraded by motion artifacts.   2.  No intra or extrahepatic bile duct dilation. CBD diameter is 5 mm. The MRCP imaging is otherwise degraded and cannot accurately exclude choledocholithiasis although none is clearly visible.   3.  Gallbladder wall thickening and edema. Gallstones are present, better seen on comparison prior CT study. There is possible cholecystitis. This could be definitively confirmed with HIDA scan if indicated.   4.  2.5 x 1.4 cm lesion in pancreatic head. This could be phlegmon or early pseudocyst. Recommend follow-up imaging to assess resolution and  exclude a pancreas mass.   5.  Fat stranding around the pancreas head and region, recommend laboratory correlation for pancreatitis.   6.  2.6 cm irregular spiculated nodular lesion along the inferior aspect of gastric body. This has some calcifications within it as seen on the previous CT study. The lesion is indeterminate. Recommend PET CT workup.   7.  Wall thickening of the proximal duodenum which could be due to duodenitis or cholecystitis.   8.  Cirrhosis, splenomegaly. TIPS in place.   9.  Mild to moderate ascites.          ASSESSMENT/PLAN     Given his MELD score and his significant history with his liver high risk surgical candidate.  Agree with gastroenterology and this concern.  ERCP to decrease the chance of gallstone pancreatitis.  Will hold off on surgical invention at this time.  Do not hesitate to reconsult with questions or concerns.    Uvaldo Gibbons MD  [facility]

## 2024-06-17 PROBLEM — N17.9 ACUTE RENAL FAILURE (ARF) (HCC): Status: ACTIVE | Noted: 2024-01-01

## 2024-06-18 PROBLEM — K85.90 PANCREATITIS: Status: ACTIVE | Noted: 2024-01-01

## 2024-06-18 PROBLEM — R65.21 SEPTIC SHOCK (HCC): Status: ACTIVE | Noted: 2024-01-01

## 2024-06-18 PROBLEM — A41.9 SEPTIC SHOCK (HCC): Status: ACTIVE | Noted: 2024-01-01

## 2024-06-18 NOTE — ASSESSMENT & PLAN NOTE
History of cirrhosis, MELD on admit 33  Etiology: Hereditary hemochromatosis versus EtOH  Status post TIPS  norepinephrine  Ultrasound showed TIPS shunt patent  Panculture negative including para * 2 on ceftriaxone (SBP by wbc criteria)

## 2024-06-18 NOTE — ASSESSMENT & PLAN NOTE
This is Septic shock Present on admission  SIRS criteria identified on my evaluation include: Tachycardia, with heart rate greater than 90 BPM, Tachypnea, with respirations greater than 20 per minute, and Leukocytosis, with WBC greater than 12,000  Clinical indicators of end organ dysfunction include Hypotension with systolic blood pressure less than 90 or MAP less than 65 and Lactic Acid greater than 2  Indicators of septic shock include: Sepsis present and persistent hypotension despite fluid resuscitation   Sources is: Possibly bacterial peritonitis  Sepsis protocol initiated  Crystalloid Fluid Administration: Fluid resuscitation ordered per standard protocol - 30 mL/kg per current or ideal body weight.  Given at outside hospital  IV antibiotics as appropriate for source of sepsis  Reassessment: I have reassessed the patient's hemodynamic status  Pancultures ordered, follow-up culture from [facility]    6/18: wbc 20, levophed 0.22 mcgs  6/19: wbc 14.7, levophed and vaopressin  6/20: wbc 11.6, levophed 0.18 mcgs, cultures negative and on ceftriaxone

## 2024-06-18 NOTE — DISCHARGE PLANNING
CM reviewed chart and patient was discussed in IDT rounds.    CM reached out to Bandar, Eddie wife, and left a vm for her to call back, as Andrew is not feeling well.    CM went to bedside, Andrew had just been given anti nausea meds.    He and his wife live with 3 children and prior to this admission he has been  independent with ADLs/IADLs.    Assessment shortened due to patient not feeling well.

## 2024-06-18 NOTE — ASSESSMENT & PLAN NOTE
Normal creatinine when discharged on 6/8/2024, up to 9.7 on admit but improving now 7.6  Lasix drip 5 mg/hr  Bicarb drip   Appreciate nephology consult    ? Type I hepatorenal vs ATN from sepsis  Norepinephrine (received albumin)  Renal dose meds, avoid nephrotoxins  Strict I/Os  Urine Na 21  Kidney morphology normal on CT from outside hospital

## 2024-06-18 NOTE — CONSULTS
[facility] INTENSIVIST DIRECT ADMISSION REPORT    Transferring facility: [facility]    Chief complaint: Renal failure, shock    Pertinent history & patient course:   53-year-old male with a history of hemochromatosis and cirrhosis, recently admitted to [facility] for gallstone pancreatitis (turned down for surgery due to his morbidity), ERCP was done at that time with sphincterotomy and retrieval of stones.  He presented back to [facility] today complaining of general malaise and was found to have the following lab abnormalities:    NA-119, K5.4  Bicarb 13  Creatinine 10 (was 0.76 on discharge on 6/8/2024)    WBC 18  Platelets 228    He got 2 L of crystalloid and 50 g of albumin.  Blood pressure went from 60s systolic to 80s systolic.  Was also given ceftriaxone and I have asked them to give Flagyl as well.  He is full code      Pertinent imaging & lab results: As above      Code Status: Full per transferring provider, I personally verified with the transferring provider patient's code status and the transferring provider has confirmed this with the patient.    Further work up or recommendations prior to transfer: Add Flagyl, start norepinephrine    Consultants called prior to transfer and pertinent input from consultants: None    Patient accepted for transfer: Yes  Consultants to be called upon arrival: Nephrology, GI-he should be evaluated for transplant  Floor requested: ICU  ADT order placed for accepted patient: Yes    Please inform the Intensivist upon assignment of an ICU bed and then again on arrival of the patient.

## 2024-06-18 NOTE — PROGRESS NOTES
4 Eyes Skin Assessment Completed by SAMMY uBeno and Noel RN.    Head WDL  Ears Redness and Blanching  Nose Scab  Mouth WDL  Neck WDL  Breast/Chest Redness and Bruising  Shoulder Blades WDL  Spine Bruising  (R) Arm/Elbow/Hand Bruising  (L) Arm/Elbow/Hand Bruising and Abrasion  Abdomen Bruising  Groin WDL  Scrotum/Coccyx/Buttocks Redness and Blanching bruising  (R) Leg Scar and Bruising  (L) Leg Scar and Bruising  (R) Heel/Foot/Toe Redness, Blanching, and Swelling  (L) Heel/Foot/Toe Redness, Blanching, and Swelling          Devices In Places ECG, Tele Box, Blood Pressure Cuff, Pulse Ox, and Central Line      Interventions In Place Heel Mepilex, TAP System, Pillows, Q2 Turns, and Low Air Loss Mattress    Possible Skin Injury No    Pictures Uploaded Into Epic N/A  Wound Consult Placed N/A  RN Wound Prevention Protocol Ordered No

## 2024-06-18 NOTE — H&P
Critical Care H&P    Date of consult: 6/18/2024    Referring Physician  John Leyva M.D.    Reason for Consultation  BACILIO, hyponatremia    History of Presenting Illness  53 y.o. male with past medical history of cirrhosis possibly due to hereditary hemochromatosis versus alcohol as well as gallstone pancreatitis (evaluated at [facility] and [facility] and both times surgery turned down due to cirrhosis with elevated MELD) who presented 6/18/2024 as a transfer from [facility] with acute kidney injury, hyponatremia and leukocytosis.  The patient was recently admitted at [facility] where he was found to have pancreatitis and gallstones.  An ERCP was done with sphincterotomy and no obstruction was identified.  The patient followed up with Dr. Noriega and has an appointment to see the hepatology clinic at [place] on 12th of July.  He presented to the outside hospital for generalized malaise, nausea, vomiting, inability to take oral fluids, decreased urine output and increased swelling.  He recently had a paracentesis done about 5 days ago.  He was found to have a leukocytosis with a white count of 18.8, mild anemia with a hemoglobin of 11.4, normal platelet count at 228.  Metabolic panel showed moderate hyponatremia at 119, hyperkalemia at 5.4 (he received insulin, dextrose and calcium) his creatinine was also noted to be markedly elevated at 10.41 up from 2.83 six days ago.  He underwent a paracentesis which showed a WBC count of 5750 and a RBC count of 8700.  He was initiated on Rocephin, Flagyl and norepinephrine.  He was given IV fluids and albumin and transferred to our facility for higher level of care.    The patient states he has remained compliant with his Lasix and spironolactone and has not drank any alcohol since February.    Code Status  Full Code    Review of Systems  Review of Systems   Constitutional:  Positive for malaise/fatigue.   Respiratory:   Negative for shortness of breath.    Cardiovascular:  Positive for leg swelling. Negative for chest pain.   Gastrointestinal:  Positive for abdominal pain, nausea and vomiting.   Neurological:  Positive for dizziness.   All other systems reviewed and are negative.      Past Medical History   has no past medical history on file.    Surgical History   has a past surgical history that includes pr ercp,diagnostic (N/A, 6/4/2024) and egd w/endoscopic ultrasound (N/A, 6/4/2024).    Family History  family history is not on file.    Social History       Medications  Home Medications    **Home medications have not yet been reviewed for this encounter**       Audit from Redirected Encounters    **Home medications have not yet been reviewed for this encounter**       No current facility-administered medications for this encounter.       Allergies  No Known Allergies    Vital Signs last 24 hours       Physical Exam  Physical Exam  Vitals and nursing note reviewed.   Constitutional:       General: He is in acute distress.      Appearance: Normal appearance. He is well-developed. He is obese. He is ill-appearing.   HENT:      Head: Normocephalic and atraumatic.      Right Ear: External ear normal.      Left Ear: External ear normal.      Nose: Nose normal.      Mouth/Throat:      Mouth: Mucous membranes are moist.   Eyes:      Extraocular Movements: Extraocular movements intact.      Conjunctiva/sclera: Conjunctivae normal.   Neck:      Vascular: No JVD.      Trachea: No tracheal deviation.   Cardiovascular:      Rate and Rhythm: Normal rate and regular rhythm.      Pulses: Normal pulses.   Pulmonary:      Effort: Pulmonary effort is normal.      Breath sounds: Normal breath sounds. No rales.   Abdominal:      General: Bowel sounds are normal. There is no distension.      Palpations: Abdomen is soft. There is fluid wave.      Tenderness: There is no abdominal tenderness.       Musculoskeletal:         General: No tenderness.       Cervical back: Neck supple.      Right lower le+ Pitting Edema present.      Left lower le+ Pitting Edema present.      Comments: Pitting edema to the proximal thighs   Skin:     General: Skin is warm and dry.      Capillary Refill: Capillary refill takes less than 2 seconds.      Coloration: Skin is jaundiced.      Findings: No rash.   Neurological:      General: No focal deficit present.      Mental Status: He is alert and oriented to person, place, and time.      Cranial Nerves: No cranial nerve deficit.      Sensory: No sensory deficit.      Motor: No weakness.   Psychiatric:         Mood and Affect: Mood normal.         Behavior: Behavior normal.         Fluids  No intake or output data in the 24 hours ending 24 0142    Laboratory  No results found for this or any previous visit (from the past 48 hour(s)).    Imaging  OUTSIDE IMAGES-CT ABDOMEN /PELVIS   Final Result      US-VISCERAL VASCULAR LIMITED    (Results Pending)       Assessment/Plan  * Acute renal failure (ARF) (HCC)- (present on admission)  Assessment & Plan  Normal creatinine when discharged on 2024, increased to 2.836 days ago.  Now 10.4  Seen by Dr. Chao of [facility] nephrology during his last hospitalization  ? Type I hepatorenal vs ATN from sepsis  Norepinephrine and albumin ordered  Renal dose meds, avoid nephrotoxins  Strict I/Os  Follow renal function  urine studies ordered  Kidney morphology normal on CT from outside hospital    Septic shock (HCC)- (present on admission)  Assessment & Plan  This is Septic shock Present on admission  SIRS criteria identified on my evaluation include: Tachycardia, with heart rate greater than 90 BPM, Tachypnea, with respirations greater than 20 per minute, and Leukocytosis, with WBC greater than 12,000  Clinical indicators of end organ dysfunction include Hypotension with systolic blood pressure less than 90 or MAP less than 65 and Lactic Acid greater than 2  Indicators of septic shock  include: Sepsis present and persistent hypotension despite fluid resuscitation   Sources is: Possibly bacterial peritonitis  Sepsis protocol initiated  Crystalloid Fluid Administration: Fluid resuscitation ordered per standard protocol - 30 mL/kg per current or ideal body weight.  Given at outside hospital  IV antibiotics as appropriate for source of sepsis  Reassessment: I have reassessed the patient's hemodynamic status  Pancultures ordered, follow-up culture from [facility]    Hyponatremia- (present on admission)  Assessment & Plan  Acute on chronic, hypervolemic  Due to cirrhosis  Slowly correct to 125-127 in 24 hours  BMP q6  Strict I/Os    Cirrhosis (HCC)- (present on admission)  Assessment & Plan  History of cirrhosis, MELD currently 33  Etiology: Hereditary hemochromatosis versus EtOH  Status post TIPS  Albumin, norepinephrine  Ultrasound to evaluate TIPS shunt  Panculture in the setting of BACILIO, started on Rocephin and Flagyl, will continue for now  Paracentesis completed at outside hospital with elevated WBC count and 57% Polys -will continue antibiotics for now, follow-up cultures from [facility]    Hereditary hemochromatosis (HCC)- (present on admission)  Assessment & Plan  Possible history of  Check iron levels, previous were low  Has required phlebotomy in the past however that was 6 to 7 years ago    Pancreatitis- (present on admission)  Assessment & Plan  History of possible gallstone pancreatitis  Recheck lipase given abdominal pain        Discussed patient condition and risk of morbidity and/or mortality with RN, RT, Pharmacy, and Patient.      The patient remains critically ill.  Critical care time = 68 minutes in directly providing and coordinating critical care and extensive data review.  No time overlap and excludes procedures.    Please note that this dictation was created using voice recognition software. The accuracy of the dictation is limited to the abilities of the software. I have  made every reasonable attempt to correct obvious errors, but I expect that there are errors of grammar and possibly content that I did not discover before finalizing the note.

## 2024-06-18 NOTE — ASSESSMENT & PLAN NOTE
Acute on chronic, hypervolemic  Admitted with 117  Due to cirrhosis  Slowly corrected to 123   BMP q6  Strict I/Os

## 2024-06-18 NOTE — ASSESSMENT & PLAN NOTE
Possible history of  Iron 62  Has required phlebotomy in the past however that was 6 to 7 years ago

## 2024-06-18 NOTE — CONSULTS
[facility] -  CONSULTATION NOTE               Author: Winifred Zamora M.D. Date & Time: 6/18/2024  8:28 AM       REASON FOR CONSULTATION:   BACILIO    CHIEF COMPLAINT:       HISTORY OF PRESENT ILLNESS:    53 y.o. male with h/o HTN, hereditary hemochromatosis, cirrhosis complicated by ascites requiring TIPS, who presented to outside hospital with c/o generalized weakness, nausea, vomiting, decreased p.o. intake as well as  decreased urine output for the past several days.  He was found to be hypotensive 60 mmHg SBP on arrival .   Initial labs noted for Na 119, K 5.4 bicarb 13 and elevated Cr. Pt had diagnostic paracentetics with evidence of SBP.   Pt treated with 2 L of crystalloids, albumin 50 g and IV ceftriaxone and Flagyl.  He was transferred to [facility] for further evaluation and continuation of care.  Lab work upon arrival to [facility] showed WBC 21, S Na 117 ( 6/18 2.30 ) improved to 119 on AM labs, K 5.4, CO2 12, BUN 85, Cr 8.8, total bilirubin 2.9, lipase 697, urine sodium less than 20, UA moderate bilirubin positive nitrite, 3-5 WBCs 0-2 RBC and few bacteria  Blood pressure has since improved 90s to 100 systolic, he is on room air.  No urine output documented so far.  Patient is currently receiving albumin 25 g every 6 hours, and on norepinephrine .  Of note he was recently hospitalized for  gallstone pancreatitis status post ERCP and sphincterotomy, hospital course complicated by BACILIO in the setting of IV contrast and pancreatitis. He was seen by nephrology at th time.  Cr has  increased to 2.1 from 0.8 2/1.Cr has improved to baseline 08 6/8.   Pt has hx of chronic hyponatremia with serum sodium 130-134 during last admission   S Na . Pt was discharged on Lasix, spirolactone, lisinopril. Other pertinent home meds gabapentin and Trazodone.   Pt is alert and oriented, but  slow to answer questions. He has been taking motrin daily for abdominal pain following recent hospital  admission. Pt says he has not used any ETOH at least for the past year or so.   No F/C/CP/SOB.  No melena, hematochezia, hematemesis.  No HA, visual changes.    REVIEW OF SYSTEMS:    10 point ROS was performed and is as per HPI or otherwise negative    PAST MEDICAL HISTORY:   No past medical history on file.    PAST SURGICAL HISTORY:   Past Surgical History:   Procedure Laterality Date    [place] ERCP,DIAGNOSTIC N/A 6/4/2024    Procedure: ERCP, DIAGNOSTIC;  Surgeon: Ryley Ortiz M.D.;  Location: SURGERY [facility];  Service: Gastroenterology    EGD W/ENDOSCOPIC ULTRASOUND N/A 6/4/2024    Procedure: EGD, WITH ENDOSCOPIC US;  Surgeon: Ryley Ortiz M.D.;  Location: [facility];  Service: Gastroenterology       FAMILY HISTORY:   family history is not on file.    SOCIAL HISTORY:        MEDICATIONS:   Home medications reviewed and documented in chart    No current facility-administered medications on file prior to encounter.     Current Outpatient Medications on File Prior to Encounter   Medication Sig Dispense Refill    senna-docusate (PERICOLACE OR SENOKOT S) 8.6-50 MG Tab Take 2 Tablets by mouth 2 times a day. 30 Tablet 0    polyethylene glycol/lytes (MIRALAX) Pack Take 1 Packet by mouth 1 time a day as needed (if sennosides and docusate ineffective after 24 hours). 10 Packet 0    spironolactone (ALDACTONE) 25 MG Tab Take 25 mg by mouth every day.      ondansetron (ZOFRAN ODT) 4 MG TABLET DISPERSIBLE Take 4 mg by mouth every 6 hours as needed for Nausea/Vomiting.      riFAXIMin (XIFAXAN) 550 MG Tab tablet Take 500 mg by mouth every day. No known stop date      furosemide (LASIX) 40 MG Tab Take 40 mg by mouth 1 time a day as needed. Indications: Edema      omeprazole (PRILOSEC) 40 MG delayed-release capsule Take 40 mg by mouth every day.      traZODone (DESYREL) 50 MG Tab Take 50 mg by mouth every evening.      gabapentin (NEURONTIN) 300 MG Cap Take 300 mg by mouth 2 times a day.      multivitamin Tab  Take 1 Tablet by mouth every day.      lisinopril (PRINIVIL) 10 MG Tab Take 10 mg by mouth every day.         Current Facility-Administered Medications   Medication Dose Route Frequency Provider Last Rate Last Admin    riFAXIMin (Xifaxan) tablet 550 mg  550 mg Oral DAILY Arturo Ware Jr., D.O.   550 mg at 06/18/24 0554    omeprazole (PriLOSEC) capsule 40 mg  40 mg Oral DAILY Arturo Ware Jr., D.O.   40 mg at 06/18/24 0554    heparin injection 5,000 Units  5,000 Units Subcutaneous Q8HRS Arturo Ware Jr., D.O.   5,000 Units at 06/18/24 0554    ondansetron (Zofran) syringe/vial injection 4 mg  4 mg Intravenous Q4HRS PRN Arturo Ware Jr., D.O.        ondansetron (Zofran ODT) dispertab 4 mg  4 mg Oral Q4HRS PRN Arturo Ware Jr., D.O.        promethazine (Phenergan) tablet 12.5-25 mg  12.5-25 mg Oral Q4HRS PRN Arturo Ware Jr. D.O.        promethazine (Phenergan) suppository 12.5-25 mg  12.5-25 mg Rectal Q4HRS PRN ABDELRAHMAN Torres Jr..O.        prochlorperazine (Compazine) injection 5-10 mg  5-10 mg Intravenous Q4HRS PRN Arturo Ware Jr., D.O.        albumin human 25% solution 25 g  25 g Intravenous Q6HRS Arturo Ware Jr. D.O. 150 mL/hr at 06/18/24 0608 25 g at 06/18/24 0608    norepinephrine (Levophed) 8 mg in 250 mL NS infusion (premix)  0-1 mcg/kg/min (Ideal) Intravenous Continuous Arturo Ware Jr. D.O. 39.6 mL/hr at 06/18/24 0720 0.25 mcg/kg/min at 06/18/24 0720    vasopressin (Vasostrict) 20 Units in  mL Infusion  0.03 Units/min Intravenous Continuous Arturo Ware Jr., D.O.   Dose not Required at 06/18/24 0215    LR (Bolus) infusion 500 mL  500 mL Intravenous Once PRN Arturo Ware Jr., D.O.        cefTRIAXone (Rocephin) syringe 2,000 mg  2,000 mg Intravenous DAILY Arturo Ware Jr., D.O.   2,000 mg at 06/18/24 0555    metroNIDAZOLE (Flagyl) IVPB 500 mg  500 mg Intravenous Q12HRS Arturo Ware Jr., D.O. 100 mL/hr at 06/18/24 0631 500 mg at 06/18/24 0631    sodium  "bicarbonate 75 mEq in 1/2 NS 1,000 mL infusion  75 mEq Intravenous Continuous Barbra Adrian M.D.           ALLERGIES:  Patient has no known allergies.    VS:  BP 93/44   Pulse 95   Temp 36.8 °C (98.2 °F) (Temporal)   Resp 12   Ht 1.905 m (6' 3\")   Wt (!) 130 kg (285 lb 11.5 oz)   SpO2 95%   BMI 35.71 kg/m²   Physical Exam  Constitutional:       General: He is not in acute distress.     Appearance: He is ill-appearing.   HENT:      Head: Normocephalic.      Mouth/Throat:      Mouth: Mucous membranes are dry.   Eyes:      General: Scleral icterus present.   Cardiovascular:      Rate and Rhythm: Tachycardia present.   Pulmonary:      Effort: Pulmonary effort is normal.   Abdominal:      General: There is distension.   Musculoskeletal:         General: Swelling present.   Skin:     General: Skin is warm.   Neurological:      Mental Status: He is oriented to person, place, and time.   Psychiatric:         Mood and Affect: Mood normal.         FLUID BALANCE:    Intake/Output Summary (Last 24 hours) at 6/18/2024 0828  Last data filed at 6/18/2024 0730  Gross per 24 hour   Intake 276.22 ml   Output 0 ml   Net 276.22 ml       LABS:  Recent Labs     06/18/24  0230 06/18/24  0415 06/18/24  0615   RBC 3.39*  --  2.85*   HEMOGLOBIN 11.1*  --  9.4*   HEMATOCRIT 32.3*  --  26.9*   PLATELETCT 238  --  245   PROTHROMBTM  --  20.4*  --    INR  --  1.73*  --    IRON 62  --   --    TOTIRONBC 115*  --   --        Recent Labs     06/18/24  0230 06/18/24  0615   SODIUM 117* 119*   POTASSIUM 5.4 5.4   CHLORIDE 85* 85*   CO2 12* 12*   GLUCOSE 122* 113*   BUN 85* 86*   CREATININE 8.88* 9.23*   CALCIUM 7.8* 7.7*        URINALYSIS:  Lab Results   Component Value Date/Time    COLORURINE Yellow 06/04/2024 1425    CLARITY Hazy (A) 06/04/2024 1425    SPECGRAVITY 1.020 06/04/2024 1425    PHURINE 5.5 06/04/2024 1425    KETONES 15 (A) 06/04/2024 1425    PROTEINURIN 30 (A) 06/04/2024 1425    BILIRUBINUR Moderate (A) 06/04/2024 1425    " "NITRITE Positive (A) 06/04/2024 1425    LEUKESTERAS Negative 06/04/2024 1425    OCCULTBLOOD Negative 06/04/2024 1425       UPC  No results found for: \"TOTPROTUR\" No results found for: \"CREATININEU\"    IMAGING:  Imaging studies reviewed by me    OUTSIDE IMAGES-CT ABDOMEN /PELVIS   Final Result      US-LIVER AND VESSELS LTD (TIPS) (COMBO)    (Results Pending)       IMPRESSION:  # BACILIO oliguric   - Suspect sec to  ischemic ATN in the setting of unstable hemodynamics, decrease EABV, septic shock, SBP, NSAIDs, and bile cast nephropathy   - Low suspicion for HRS based on clinical presentation   - No evidence of obstruction on CT from outside hospital   # Cirrhosis, MELD 33   - Possible etiologies hereditary hemochromatosis versus history of alcoholism   # Hyponatremia   - Suspect sec to cirrhosis, decreased EABV   - Also gabapentin and trazodone can alter ADH secretion and may have contributed   # Septic shock likely sec to SBP   # Metabolic acidosis   # Pancreatitis   # SBP   # Hypervolemia, total body volume overload with decrease EABV      SUGGESTIONS:  - No compelling indication for RRT  - Daily evaluation for RRT needs, pt agreeable to dialysis if needed ever.   - Continue vasopressors to maintain MAP > 65 mmHg  - Raise serum Na in small increments 4-6 mEq/L in 24 hrs as pt is at risk for ODS  - Maintain serum Na at 121 -123  mEq/L until 6/19 2.30 PM  - Monitor serum Na q 4-6 hrs   - Isotonic bicarb 50 cc/ hr   - IV albumin   - Ab per primary svc   - Avoid nephrotoxins   - Monitor and correct electrolytes as indicated   - Adjust meds for renal function   - Hold / avoid diuresis   - Hold gabapentin and trazodone  - Limit fluid intake 1500 cc/d ( currently NPO)      Page nephrology if S Na reaches SNa 123 or above  Pt is critically ill, prognosis is poor, discussed the above with primary svc       Thank you for the consultation!                  "

## 2024-06-18 NOTE — PROGRESS NOTES
"Pulmonary Progress Note    Date of admission  6/18/2024    Chief Complaint  53 y.o. male admitted 6/18/2024 with Tx from [facility] for SBP    Hospital Course  \"53 y.o. male with past medical history of cirrhosis possibly due to hereditary hemochromatosis versus alcohol as well as gallstone pancreatitis (evaluated at [facility] and [facility] and both times surgery turned down due to cirrhosis with elevated MELD) who presented 6/18/2024 as a transfer from [facility] with acute kidney injury, hyponatremia and leukocytosis.  The patient was recently admitted at [facility] where he was found to have pancreatitis and gallstones.  An ERCP was done with sphincterotomy and no obstruction was identified.  The patient followed up with Dr. Noriega and has an appointment to see the hepatology clinic at [place] on 12th of July.  He presented to the outside hospital for generalized malaise, nausea, vomiting, inability to take oral fluids, decreased urine output and increased swelling.  He recently had a paracentesis done about 5 days ago.  He was found to have a leukocytosis with a white count of 18.8, mild anemia with a hemoglobin of 11.4, normal platelet count at 228.  Metabolic panel showed moderate hyponatremia at 119, hyperkalemia at 5.4 (he received insulin, dextrose and calcium) his creatinine was also noted to be markedly elevated at 10.41 up from 2.83 six days ago.  He underwent a paracentesis which showed a WBC count of 5750 and a RBC count of 8700.  He was initiated on Rocephin, Flagyl and norepinephrine.  He was given IV fluids and albumin and transferred to our facility for higher level of care.     The patient states he has remained compliant with his Lasix and spironolactone and has not drank any alcohol since February.\" From Dr. Ware's H and P    Interval Problem Update  Reviewed last 24 hour events:  Levophed 0.22 mcgs  Pain has improved  Wbc 20.7  Na " 119  Creatinine still worsening - will consult nephrology    Review of Systems  Review of Systems   Constitutional:  Positive for malaise/fatigue.   HENT: Negative.     Eyes: Negative.    Respiratory: Negative.     Cardiovascular: Negative.    Gastrointestinal:  Positive for abdominal pain and nausea.   Genitourinary: Negative.    Musculoskeletal: Negative.    Skin: Negative.    Neurological:  Positive for weakness.   Endo/Heme/Allergies: Negative.    Psychiatric/Behavioral: Negative.          Vital Signs for last 24 hours   Temp:  [36.2 °C (97.1 °F)-36.8 °C (98.2 °F)] 36.6 °C (97.8 °F)  Pulse:  [] 110  Resp:  [5-31] 28  BP: ()/(41-66) 116/66  SpO2:  [94 %-97 %] 94 %    Hemodynamic parameters for last 24 hours       Respiratory Information for the last 24 hours       Physical Exam   Physical Exam  Constitutional:       Appearance: He is ill-appearing.   HENT:      Head: Normocephalic and atraumatic.   Eyes:      General: Scleral icterus present.      Extraocular Movements: Extraocular movements intact.      Pupils: Pupils are equal, round, and reactive to light.   Cardiovascular:      Rate and Rhythm: Normal rate.   Pulmonary:      Effort: Pulmonary effort is normal.      Breath sounds: Normal breath sounds.   Abdominal:      General: There is distension.      Tenderness: There is abdominal tenderness. There is no guarding or rebound.   Musculoskeletal:      Right lower leg: Edema present.      Left lower leg: Edema present.   Neurological:      General: No focal deficit present.      Mental Status: He is alert and oriented to person, place, and time.      Comments: Lethargic but communicative   Psychiatric:         Mood and Affect: Mood normal.         Behavior: Behavior normal.         Thought Content: Thought content normal.         Judgment: Judgment normal.         Medications  Current Facility-Administered Medications   Medication Dose Route Frequency Provider Last Rate Last Admin    riFAXIMin  (Xifaxan) tablet 550 mg  550 mg Oral DAILY Arturo Ware Jr., D.O.   550 mg at 06/18/24 0554    omeprazole (PriLOSEC) capsule 40 mg  40 mg Oral DAILY Arturo Ware Jr., D.O.   40 mg at 06/18/24 0554    heparin injection 5,000 Units  5,000 Units Subcutaneous Q8HRS Arturo Ware Jr. D.O.   5,000 Units at 06/18/24 0554    ondansetron (Zofran) syringe/vial injection 4 mg  4 mg Intravenous Q4HRS PRN Arturo Ware Jr., D.O.        ondansetron (Zofran ODT) dispertab 4 mg  4 mg Oral Q4HRS PRN ABDELRAHMAN Torres Jr..O.        promethazine (Phenergan) tablet 12.5-25 mg  12.5-25 mg Oral Q4HRS PRN Arturo Ware Jr. D.O.        promethazine (Phenergan) suppository 12.5-25 mg  12.5-25 mg Rectal Q4HRS PRN Arturo Ware Jr. D.O.        prochlorperazine (Compazine) injection 5-10 mg  5-10 mg Intravenous Q4HRS PRN Arturo Ware Jr., D.O.        albumin human 25% solution 25 g  25 g Intravenous Q6HRS Arturo Ware Jr., D.O. 150 mL/hr at 06/18/24 0608 25 g at 06/18/24 0608    norepinephrine (Levophed) 8 mg in 250 mL NS infusion (premix)  0-1 mcg/kg/min (Ideal) Intravenous Continuous Arturo Ware Jr. D.O. 31.7 mL/hr at 06/18/24 1004 0.2 mcg/kg/min at 06/18/24 1004    vasopressin (Vasostrict) 20 Units in  mL Infusion  0.03 Units/min Intravenous Continuous Arturo Ware Jr., D.O.   Dose not Required at 06/18/24 0215    LR (Bolus) infusion 500 mL  500 mL Intravenous Once PRN Arturo Ware Jr. D.O.        cefTRIAXone (Rocephin) syringe 2,000 mg  2,000 mg Intravenous DAILY Arturo Ware Jr., D.O.   2,000 mg at 06/18/24 0555    metroNIDAZOLE (Flagyl) IVPB 500 mg  500 mg Intravenous Q12HRS Arturo Ware Jr., ABDELRAHMAN.O.   Stopped at 06/18/24 0731    sodium bicarbonate 8.4 % 150 mEq in sterile water 1,000 mL infusion  150 mEq Intravenous Continuous Barbra Adrian M.D.           Fluids    Intake/Output Summary (Last 24 hours) at 6/18/2024 1048  Last data filed at 6/18/2024 0800  Gross per 24 hour   Intake  393.83 ml   Output 0 ml   Net 393.83 ml       Laboratory          Recent Labs     06/18/24  0230 06/18/24  0615   SODIUM 117* 119*   POTASSIUM 5.4 5.4   CHLORIDE 85* 85*   CO2 12* 12*   BUN 85* 86*   CREATININE 8.88* 9.23*   CALCIUM 7.8* 7.7*     Recent Labs     06/18/24  0230 06/18/24  0615   ALTSGPT 23 19   ASTSGOT 49* 45   ALKPHOSPHAT 134* 126*   TBILIRUBIN 2.9* 2.8*   LIPASE 697*  --    GLUCOSE 122* 113*     Recent Labs     06/18/24  0230 06/18/24  0615   WBC 21.0* 20.7*   NEUTSPOLYS 85.90* 82.50*   LYMPHOCYTES 4.10* 5.00*   MONOCYTES 8.40 10.20   EOSINOPHILS 0.30 0.70   BASOPHILS 0.10 0.20   ASTSGOT 49* 45   ALTSGPT 23 19   ALKPHOSPHAT 134* 126*   TBILIRUBIN 2.9* 2.8*     Recent Labs     06/18/24  0230 06/18/24  0415 06/18/24  0615   RBC 3.39*  --  2.85*   HEMOGLOBIN 11.1*  --  9.4*   HEMATOCRIT 32.3*  --  26.9*   PLATELETCT 238  --  245   PROTHROMBTM  --  20.4*  --    INR  --  1.73*  --    IRON 62  --   --    TOTIRONBC 115*  --   --        Im     Assessment/Plan  * Acute renal failure (ARF) (HCC)- (present on admission)  Assessment & Plan  Normal creatinine when discharged on 6/8/2024, increased to 2.836 days ago.  Now 11, no urine output  Started on bicarb drip and on levophed and albumin   Seen by Dr. Chao of [facility] nephrology during his last hospitalization - will consult [facility] nephrology  ? Type I hepatorenal vs ATN from sepsis  Norepinephrine and albumin   Renal dose meds, avoid nephrotoxins  Strict I/Os  So far no urine so will need to place mccarty to monitor  urine studies ordered  Kidney morphology normal on CT from outside hospital    Septic shock (HCC)- (present on admission)  Assessment & Plan  This is Septic shock Present on admission  SIRS criteria identified on my evaluation include: Tachycardia, with heart rate greater than 90 BPM, Tachypnea, with respirations greater than 20 per minute, and Leukocytosis, with WBC greater than 12,000  Clinical indicators of end organ dysfunction  include Hypotension with systolic blood pressure less than 90 or MAP less than 65 and Lactic Acid greater than 2  Indicators of septic shock include: Sepsis present and persistent hypotension despite fluid resuscitation   Sources is: Possibly bacterial peritonitis  Sepsis protocol initiated  Crystalloid Fluid Administration: Fluid resuscitation ordered per standard protocol - 30 mL/kg per current or ideal body weight.  Given at outside hospital  IV antibiotics as appropriate for source of sepsis  Reassessment: I have reassessed the patient's hemodynamic status  Pancultures ordered, follow-up culture from [facility]    6/18: wbc 20, levophed 0.22 mcgs    Hyponatremia- (present on admission)  Assessment & Plan  Acute on chronic, hypervolemic  Due to cirrhosis  Slowly correct to 125-127 in 24 hours  BMP q6  Strict I/Os    Cirrhosis (HCC)- (present on admission)  Assessment & Plan  History of cirrhosis, MELD currently 33  Etiology: Hereditary hemochromatosis versus EtOH  Status post TIPS  Albumin, norepinephrine  Ultrasound to evaluate TIPS shunt  Panculture in the setting of BACILIO, started on Rocephin and Flagyl, will continue for now  Paracentesis completed at outside hospital with elevated WBC count and 57% Polys -will continue antibiotics for now, follow-up cultures from [facility]    Hereditary hemochromatosis (HCC)- (present on admission)  Assessment & Plan  Possible history of  Iron 62  Has required phlebotomy in the past however that was 6 to 7 years ago    Pancreatitis- (present on admission)  Assessment & Plan  History of possible gallstone pancreatitis  Pancreatitis now with levels 697         VTE:  Heparin  Ulcer: Not Indicated  Lines: Central Line  Ongoing indication addressed    I have performed a physical exam and reviewed and updated ROS and Plan today (6/18/2024). In review of yesterday's note (6/17/2024), there are no changes except as documented above.     Discussed patient condition and risk of  morbidity and/or mortality with RN, Pharmacy, Charge nurse / hot rounds, and Patient  The patient remains critically ill.  Critical care time = 39 minutes in directly providing and coordinating critical care and extensive data review.  No time overlap and excludes procedures.

## 2024-06-19 NOTE — PROGRESS NOTES
[facility] -  PROGRESS NOTE               Author: Winifred Zamora M.D. Date & Time: 6/19/2024  8:13 AM     HPI:  53 y.o. male with h/o HTN, hereditary hemochromatosis, cirrhosis complicated by ascites requiring TIPS, who presented to outside hospital with c/o generalized weakness, nausea, vomiting, decreased p.o. intake as well as  decreased urine output for the past several days.  He was found to be hypotensive 60 mmHg SBP on arrival .   Initial labs noted for Na 119, K 5.4 bicarb 13 and elevated Cr. Pt had diagnostic paracentetics with evidence of SBP.   Pt treated with 2 L of crystalloids, albumin 50 g and IV ceftriaxone and Flagyl.  He was transferred to [facility] for further evaluation and continuation of care.  Lab work upon arrival to [facility] showed WBC 21, S Na 117 ( 6/18 2.30 ) improved to 119 on AM labs, K 5.4, CO2 12, BUN 85, Cr 8.8, total bilirubin 2.9, lipase 697, urine sodium less than 20, UA moderate bilirubin positive nitrite, 3-5 WBCs 0-2 RBC and few bacteria  Blood pressure has since improved 90s to 100 systolic, he is on room air.  No urine output documented so far.  Patient is currently receiving albumin 25 g every 6 hours, and on norepinephrine .  Of note he was recently hospitalized for  gallstone pancreatitis status post ERCP and sphincterotomy, hospital course complicated by BACILIO in the setting of IV contrast and pancreatitis. He was seen by nephrology at th time.  Cr has  increased to 2.1 from 0.8 2/1.Cr has improved to baseline 08 6/8.   Pt has hx of chronic hyponatremia with serum sodium 130-134 during last admission   S Na . Pt was discharged on Lasix, spirolactone, lisinopril. Other pertinent home meds gabapentin and Trazodone.   Pt is alert and oriented, but  slow to answer questions. He has been taking motrin daily for abdominal pain following recent hospital admission. Pt says he has not used any ETOH at least for the past year or so.   No  "F/C/CP/SOB.  No melena, hematochezia, hematemesis.  No HA, visual changes.    DAILY NEPHROLOGY SUMMARY:  6/18: consult done   6/19: No acute events, more alert today, answering questions appropriately,  cc/ 24 hrs, received lasix 100 mg earlier today, seem to be responding to IV lasix,  cc s/p lasix. Increase abdominal distention, wife at bed side, on vasopressor and NE, on room air, plan for paracentesis today     REVIEW OF SYSTEMS:    10 point ROS reviewed and is as per HPI/daily summary or otherwise negative    PMH/PSH/SH/FH:  Reviewed and unchanged since admission note    CURRENT MEDICATIONS:  Reviewed from admission to present day    VS:  BP (!) 86/46   Pulse 99   Temp 36.2 °C (97.2 °F) (Temporal)   Resp 12   Ht 1.905 m (6' 3\")   Wt (!) 130 kg (285 lb 11.5 oz)   SpO2 97%   BMI 35.71 kg/m²   Physical Exam  Constitutional:       General: He is not in acute distress.     Appearance: He is ill-appearing.   HENT:      Head: Normocephalic.      Mouth/Throat:      Mouth: Mucous membranes are moist.   Eyes:      General: Scleral icterus present.   Cardiovascular:      Rate and Rhythm: Tachycardia present.   Pulmonary:      Effort: Pulmonary effort is normal.   Abdominal:      General: There is distension.   Musculoskeletal:         General: Swelling present.   Skin:     General: Skin is warm.   Neurological:      Mental Status: He is oriented to person, place, and time.   Psychiatric:         Mood and Affect: Mood normal.         Fluids:    Intake/Output Summary (Last 24 hours) at 6/19/2024 0813  Last data filed at 6/19/2024 0710  Gross per 24 hour   Intake 3230.49 ml   Output 270 ml   Net 2960.49 ml       LABS:  Labs reviewed, pertinent labs below.    Recent Labs     06/18/24  0230 06/18/24  0615 06/19/24  0600   WBC 21.0* 20.7* 14.7*   RBC 3.39* 2.85* 3.14*   HEMOGLOBIN 11.1* 9.4* 10.3*   HEMATOCRIT 32.3* 26.9* 28.9*   MCV 95.3 94.4 92.0   MCH 32.7 33.0 32.8   MCHC 34.4 34.9 35.6   RDW 54.2* " 53.4* 51.9*   PLATELETCT 238 245 169   MPV 11.0 10.7 10.6       Recent Labs     06/18/24  1809 06/18/24  2340 06/19/24  0600   SODIUM 118* 117* 118*   POTASSIUM 5.4 5.4 5.2   CHLORIDE 85* 84* 83*   CO2 12* 13* 13*   GLUCOSE 98 95 102*   BUN 89* 88* 92*   CREATININE 9.31* 9.71* 8.92*   CALCIUM 7.5* 7.5* 7.4*        IMAGING:  All imaging reviewed from admission to present day      IMPRESSION:  # BACILIO oliguric   - Suspect sec to  ischemic ATN in the setting of unstable hemodynamics, decrease EABV, septic shock, SBP, NSAIDs, and bile cast nephropathy   - Low suspicion for HRS based on clinical presentation   - No evidence of obstruction on CT from outside hospital   # Cirrhosis, MELD 33   - Possible etiologies hereditary hemochromatosis versus history of alcoholism   # Ascites   # Hyponatremia   - Suspect sec to cirrhosis, decreased EABV   - Also gabapentin and trazodone can alter ADH secretion and may have contributed   # Septic shock likely sec to SBP   # Metabolic acidosis   # Pancreatitis   # SBP   # Hypervolemia, total body volume overload with decrease EABV        SUGGESTIONS:  - No compelling indication for RRT, but will need close follow up  - Daily evaluation for RRT needs, pt agreeable to dialysis if needed ever.   - Continue vasopressors to maintain MAP > 65 mmHg  - Raise serum Na in small increments 4-6 mEq/L in 24 hrs as pt is at risk for ODS  - Maintain serum Na at 124  mEq/L until 6/20 6 AM   - Agree with Lasix gtt   - Monitor serum Na q 6  hrs   - Isotonic bicarb 50 cc/ hr to minimize volume   - PO bicarb 650 mg tid   - IV albumin   - Ab per primary svc   - Avoid nephrotoxins   - Adjust meds for renal function   - Holding  gabapentin and trazodone  - Limit fluid intake 1200 cc/d      [facility] nephology if pt has minimal response to diuretics, then he will likely need HD     Discussed the above with intensivist and ICU team

## 2024-06-19 NOTE — CARE PLAN
The patient is Watcher - Medium risk of patient condition declining or worsening    Shift Goals  Clinical Goals: MAP >65  Patient Goals: Comfort  Family Goals: Updates    Progress made toward(s) clinical / shift goals:    Problem: Skin Integrity  Goal: Skin integrity is maintained or improved  Outcome: Progressing     Problem: Fall Risk  Goal: Patient will remain free from falls  Outcome: Progressing       Patient is not progressing towards the following goals:      Problem: Pain - Standard  Goal: Alleviation of pain or a reduction in pain to the patient’s comfort goal  Outcome: Not Met     Problem: Hemodynamics  Goal: Patient's hemodynamics, fluid balance and neurologic status will be stable or improve  Outcome: Not Met

## 2024-06-19 NOTE — PROCEDURES
"Paracentesis    Date/Time: 6/19/2024 11:26 AM    Performed by: SEVERIANO Walter.  Authorized by: TOM Walter  Consent: Written consent obtained.  Risks and benefits: risks, benefits and alternatives were discussed  Consent given by: patient  Patient understanding: patient states understanding of the procedure being performed  Patient consent: the patient's understanding of the procedure matches consent given  Procedure consent: procedure consent matches procedure scheduled  Relevant documents: relevant documents present and verified  Test results: test results available and properly labeled  Patient identity confirmed: verbally with patient and arm band  Time out: Immediately prior to procedure a \"time out\" was called to verify the correct patient, procedure, equipment, support staff and site/side marked as required.  Initial or subsequent exam: initial  Procedure purpose: diagnostic  Indications: abdominal discomfort secondary to ascites and respiratory distress secondary to ascites    Anesthesia:  Local Anesthetic: lidocaine 2% without epinephrine    Sedation:  Patient sedated: no    Preparation: Patient was prepped and draped in the usual sterile fashion.  Needle gauge: 18  Ultrasound guidance: yes  Puncture site: right lower quadrant  Fluid removed: 6000(ml)  Fluid appearance: serosanguinous  Dressing: 4x4 sterile gauze  Patient tolerance: patient tolerated the procedure well with no immediate complications              "

## 2024-06-19 NOTE — PROGRESS NOTES
1933: Dr Leyva notified of a critical sodium of 118. No new orders at this time. Next lab draw to be drawn at 0000.

## 2024-06-19 NOTE — PROGRESS NOTES
"Pulmonary Progress Note    Date of admission  6/18/2024    Chief Complaint  53 y.o. male admitted 6/18/2024 with Tx from [facility] for SBP    Hospital Course  \"53 y.o. male with past medical history of cirrhosis possibly due to hereditary hemochromatosis versus alcohol as well as gallstone pancreatitis (evaluated at [facility] and [facility] and both times surgery turned down due to cirrhosis with elevated MELD) who presented 6/18/2024 as a transfer from [facility] with acute kidney injury, hyponatremia and leukocytosis.  The patient was recently admitted at [facility] where he was found to have pancreatitis and gallstones.  An ERCP was done with sphincterotomy and no obstruction was identified.  The patient followed up with Dr. Noriega and has an appointment to see the hepatology clinic at [place] on 12th of July.  He presented to the outside hospital for generalized malaise, nausea, vomiting, inability to take oral fluids, decreased urine output and increased swelling.  He recently had a paracentesis done about 5 days ago.  He was found to have a leukocytosis with a white count of 18.8, mild anemia with a hemoglobin of 11.4, normal platelet count at 228.  Metabolic panel showed moderate hyponatremia at 119, hyperkalemia at 5.4 (he received insulin, dextrose and calcium) his creatinine was also noted to be markedly elevated at 10.41 up from 2.83 six days ago.  He underwent a paracentesis which showed a WBC count of 5750 and a RBC count of 8700.  He was initiated on Rocephin, Flagyl and norepinephrine.  He was given IV fluids and albumin and transferred to our facility for higher level of care.     The patient states he has remained compliant with his Lasix and spironolactone and has not drank any alcohol since February.\" From Dr. Ware's H and P    6/19: paracentesis 6 L removed; started on lasix drip    Interval Problem Update  Reviewed last 24 hour " events:  Chart review from the past 24 hours includes imaging, laboratory studies, vital signs and notes available.  Pertinent data for today    Cardiac:  vaopressin and levophed  Pulm:  RA 98%  Neuro:  intact   Heme:  28  I/O:  170 cc urine; creatinine the same 9.7  ID: metronidazole and ceftriaxone   GI/endo:  taking po  Labs/Imaging:  na 117  Lines:  RIJ  Mobility:  2  Symptoms: abdominal discomfort      Review of Systems  Review of Systems   Constitutional:  Positive for malaise/fatigue.   HENT: Negative.     Eyes: Negative.    Respiratory: Negative.     Cardiovascular: Negative.    Gastrointestinal:  Positive for abdominal pain.   Genitourinary: Negative.    Musculoskeletal: Negative.    Skin: Negative.    Neurological:  Positive for weakness.   Endo/Heme/Allergies: Negative.    Psychiatric/Behavioral: Negative.          Vital Signs for last 24 hours   Temp:  [36.2 °C (97.2 °F)-36.7 °C (98 °F)] 36.2 °C (97.2 °F)  Pulse:  [] 101  Resp:  [4-53] 10  BP: ()/(39-68) 117/58  SpO2:  [67 %-98 %] 94 %    Hemodynamic parameters for last 24 hours       Respiratory Information for the last 24 hours       Physical Exam   Physical Exam  Constitutional:       Appearance: He is ill-appearing.   HENT:      Head: Normocephalic and atraumatic.   Eyes:      General: Scleral icterus present.      Extraocular Movements: Extraocular movements intact.      Pupils: Pupils are equal, round, and reactive to light.   Cardiovascular:      Rate and Rhythm: Normal rate.   Pulmonary:      Effort: Pulmonary effort is normal.      Breath sounds: Normal breath sounds.   Abdominal:      General: There is distension.      Tenderness: There is abdominal tenderness. There is no guarding or rebound.   Musculoskeletal:      Right lower leg: Edema present.      Left lower leg: Edema present.   Neurological:      General: No focal deficit present.      Mental Status: He is alert and oriented to person, place, and time.      Comments:  Lethargic but communicative   Psychiatric:         Mood and Affect: Mood normal.         Behavior: Behavior normal.         Thought Content: Thought content normal.         Judgment: Judgment normal.         Medications  Current Facility-Administered Medications   Medication Dose Route Frequency Provider Last Rate Last Admin    furosemide (Lasix) 100 mg in  mL infusion  10 mg/hr Intravenous Continuous Barbra Adrian M.D. 10 mL/hr at 06/19/24 1026 10 mg/hr at 06/19/24 1026    sodium bicarbonate 8.4 % 150 mEq in sterile water 1,000 mL infusion  150 mEq Intravenous Continuous Barbra Adrian M.D. 50 mL/hr at 06/19/24 0952 Rate Change at 06/19/24 0952    sodium bicarbonate tablet 650 mg  650 mg Oral TID Barbra Adrian M.D.        albumin human 25% solution 50 g  50 g Intravenous Once Barbra Adrian M.D.        riFAXIMin (Xifaxan) tablet 550 mg  550 mg Oral DAILY Arturo Ware Jr., D.O.   550 mg at 06/19/24 0551    omeprazole (PriLOSEC) capsule 40 mg  40 mg Oral DAILY Arturo Ware Jr., D.O.   40 mg at 06/19/24 0551    heparin injection 5,000 Units  5,000 Units Subcutaneous Q8HRS Artruo Ware Jr., D.O.   5,000 Units at 06/19/24 0551    ondansetron (Zofran) syringe/vial injection 4 mg  4 mg Intravenous Q4HRS PRN Arturo Ware Jr., D.O.   4 mg at 06/18/24 1922    ondansetron (Zofran ODT) dispertab 4 mg  4 mg Oral Q4HRS PRN Arturo Ware Jr., D.O.        promethazine (Phenergan) tablet 12.5-25 mg  12.5-25 mg Oral Q4HRS PRN ABDELRAHMAN Torres Jr..O.        promethazine (Phenergan) suppository 12.5-25 mg  12.5-25 mg Rectal Q4HRS PRN ABDELRAHMAN Torres Jr..O.        prochlorperazine (Compazine) injection 5-10 mg  5-10 mg Intravenous Q4HRS PRN Arturo Ware Jr. D.O.        norepinephrine (Levophed) 8 mg in 250 mL NS infusion (premix)  0-1 mcg/kg/min (Ideal) Intravenous Continuous ABDELRAHMAN Torres Jr..O. 31.7 mL/hr at 06/19/24 0832 0.2 mcg/kg/min at 06/19/24 0832    vasopressin  (Vasostrict) 20 Units in  mL Infusion  0.03 Units/min Intravenous Continuous ABDELRAHMAN Torres Jr..OMichel 9 mL/hr at 06/19/24 0839 0.03 Units/min at 06/19/24 0839    LR (Bolus) infusion 500 mL  500 mL Intravenous Once PRN ABDELRAHMAN Torres Jr..O.        cefTRIAXone (Rocephin) syringe 2,000 mg  2,000 mg Intravenous DAILY ABDELRAHMAN Torres Jr..OMichel   2,000 mg at 06/19/24 0607    benzocaine-menthol (Cepacol) lozenge 1 Lozenge  1 Lozenge Mouth/Throat Q2HRS PRN Lida L. Latona   1 Lozenge at 06/19/24 0000    HYDROmorphone (Dilaudid) injection 0.2 mg  0.2 mg Intravenous Q3HRS PRN Lida L. Latona        Or    HYDROmorphone (Dilaudid) injection 0.5 mg  0.5 mg Intravenous Q3HRS PRN Lida L. Latona   0.5 mg at 06/19/24 0644       Fluids    Intake/Output Summary (Last 24 hours) at 6/19/2024 1205  Last data filed at 6/19/2024 0830  Gross per 24 hour   Intake 3340.95 ml   Output 242 ml   Net 3098.95 ml       Laboratory          Recent Labs     06/18/24  1809 06/18/24  2340 06/19/24  0600   SODIUM 118* 117* 118*   POTASSIUM 5.4 5.4 5.2   CHLORIDE 85* 84* 83*   CO2 12* 13* 13*   BUN 89* 88* 92*   CREATININE 9.31* 9.71* 8.92*   MAGNESIUM  --  1.7  --    PHOSPHORUS  --  8.2* 7.3*   CALCIUM 7.5* 7.5* 7.4*     Recent Labs     06/18/24  0230 06/18/24  0615 06/18/24  1147 06/18/24  1809 06/18/24  2340 06/19/24  0600   ALTSGPT 23 19  --   --   --  20   ASTSGOT 49* 45  --   --   --  51*   ALKPHOSPHAT 134* 126*  --   --   --  141*   TBILIRUBIN 2.9* 2.8*  --   --   --  2.5*   LIPASE 697*  --   --   --   --   --    GLUCOSE 122* 113*   < > 98 95 102*    < > = values in this interval not displayed.     Recent Labs     06/18/24  0230 06/18/24  0615 06/19/24  0600   WBC 21.0* 20.7* 14.7*   NEUTSPOLYS 85.90* 82.50* 80.00*   LYMPHOCYTES 4.10* 5.00* 5.80*   MONOCYTES 8.40 10.20 12.00   EOSINOPHILS 0.30 0.70 0.70   BASOPHILS 0.10 0.20 0.10   ASTSGOT 49* 45 51*   ALTSGPT 23 19 20   ALKPHOSPHAT 134* 126* 141*   TBILIRUBIN 2.9* 2.8* 2.5*     Recent  Labs     06/18/24  0230 06/18/24  0415 06/18/24  0615 06/19/24  0600   RBC 3.39*  --  2.85* 3.14*   HEMOGLOBIN 11.1*  --  9.4* 10.3*   HEMATOCRIT 32.3*  --  26.9* 28.9*   PLATELETCT 238  --  245 169   PROTHROMBTM  --  20.4*  --   --    INR  --  1.73*  --   --    IRON 62  --   --   --    TOTIRONBC 115*  --   --   --        Im     Assessment/Plan  * Acute renal failure (ARF) (HCC)- (present on admission)  Assessment & Plan  Normal creatinine when discharged on 6/8/2024, increased to 2.836 days ago.  Now 9.7,slight increase in UO  Bolused lasix and placed on drip  Started on bicarb drip and on levophed and albumin   Appreciate nephology consult - assess how he does with lasix to see if HD is needed  ? Type I hepatorenal vs ATN from sepsis  Norepinephrine, vaopressin and albumin   Renal dose meds, avoid nephrotoxins  Strict I/Os  Urine Na 21  Kidney morphology normal on CT from outside hospital    Septic shock (HCC)- (present on admission)  Assessment & Plan  This is Septic shock Present on admission  SIRS criteria identified on my evaluation include: Tachycardia, with heart rate greater than 90 BPM, Tachypnea, with respirations greater than 20 per minute, and Leukocytosis, with WBC greater than 12,000  Clinical indicators of end organ dysfunction include Hypotension with systolic blood pressure less than 90 or MAP less than 65 and Lactic Acid greater than 2  Indicators of septic shock include: Sepsis present and persistent hypotension despite fluid resuscitation   Sources is: Possibly bacterial peritonitis  Sepsis protocol initiated  Crystalloid Fluid Administration: Fluid resuscitation ordered per standard protocol - 30 mL/kg per current or ideal body weight.  Given at outside hospital  IV antibiotics as appropriate for source of sepsis  Reassessment: I have reassessed the patient's hemodynamic status  Pancultures ordered, follow-up culture from [facility]    6/18: wbc 20, levophed 0.22 mcgs  6/19: wbc 14.7,  levophed and vaopressin    Hyponatremia- (present on admission)  Assessment & Plan  Acute on chronic, hypervolemic  Due to cirrhosis  Now with fluid onboard difficult to correct and at 117 - started lasix so we will see  Slowly correct to 125-127 in 24 hours  BMP q6  Strict I/Os    Cirrhosis (HCC)- (present on admission)  Assessment & Plan  History of cirrhosis, MELD on admit 33  Etiology: Hereditary hemochromatosis versus EtOH  Status post TIPS  Albumin, norepinephrine  Ultrasound to evaluate TIPS shunt  Panculture in the setting of BACILIO, started on Rocephin and Flagyl, cxs negative [facility] so stop flagyl - repeated today s/p paracentesis   Paracentesis completed at outside hospital with elevated WBC count and 57% Polys -     Hereditary hemochromatosis (HCC)- (present on admission)  Assessment & Plan  Possible history of  Iron 62  Has required phlebotomy in the past however that was 6 to 7 years ago    Pancreatitis- (present on admission)  Assessment & Plan  History of possible gallstone pancreatitis  Pancreatitis  with levels 697 on admit         VTE:  Heparin  Ulcer: Not Indicated  Lines: Central Line  Ongoing indication addressed    I have performed a physical exam and reviewed and updated ROS and Plan today (6/19/2024). In review of yesterday's note (6/18/2024), there are no changes except as documented above.     Discussed patient condition and risk of morbidity and/or mortality with RN, Pharmacy, Charge nurse / hot rounds, and Patient and his wife  The patient remains critically ill.  Critical care time = 33 minutes in directly providing and coordinating critical care and extensive data review.  No time overlap and excludes procedures.

## 2024-06-19 NOTE — CARE PLAN
The patient is Watcher - Medium risk of patient condition declining or worsening    Shift Goals  Clinical Goals: MAP>65  Patient Goals: Rest  Family Goals: RAMILA    Progress made toward(s) clinical / shift goals:  Mentation better this afternoon.  All questions answered  Problem: Knowledge Deficit - Standard  Goal: Patient and family/care givers will demonstrate understanding of plan of care, disease process/condition, diagnostic tests and medications  Outcome: Progressing       Patient is not progressing towards the following goals: Abd pain persist,  Remains in Levophed infusion      Problem: Pain - Standard  Goal: Alleviation of pain or a reduction in pain to the patient’s comfort goal  Outcome: Not Progressing     Problem: Hemodynamics  Goal: Patient's hemodynamics, fluid balance and neurologic status will be stable or improve  Outcome: Not Progressing

## 2024-06-20 NOTE — PROGRESS NOTES
4 Eyes Skin Assessment Completed by SAMMY Bunch and SAMMY Rueda.    Head WDL  Ears Redness and Blanching  Nose Scab  Mouth WDL  Neck WDL  Breast/Chest Redness and Bruising, para site  Shoulder Blades WDL  Spine Bruising  (R) Arm/Elbow/Hand Bruising  (L) Arm/Elbow/Hand Bruising and Abrasion  Abdomen Bruising  Groin WDL  Scrotum/Coccyx/Buttocks Redness and Blanching bruising L buttock  (R) Leg Scar and Bruising  (L) Leg Scar and Bruising  (R) Heel/Foot/Toe Redness, Blanching, and Swelling Boggy  (L) Heel/Foot/Toe Redness, Blanching, and Swelling Boggy          Devices In Places ECG, Tele Box, Blood Pressure Cuff, Pulse Ox, and Central Line      Interventions In Place Heel Mepilex, TAP System, Pillows, Q2 Turns, and Low Air Loss Mattress    Possible Skin Injury No    Pictures Uploaded Into Epic N/A  Wound Consult Placed N/A  RN Wound Prevention Protocol Ordered No

## 2024-06-20 NOTE — CONSULTS
Hospital Medicine Consultation    Date of Service  6/20/2024    Referring Physician    Barbra Adrian M.D.     Consulting Physician  Dane Delgado M.D.    Reason for Consultation  Transfer of care    History of Presenting Illness    53 y.o. 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  Patient was recently admitted to [facility] where he was found to have pancreatitis and gallstone.  Patient remained ERCP with sphincterectomy and no obstruction was identified.  Patient has an upcoming appointment with the [facility] on July 12, 2024.  Patient found to have hyponatremia, acute kidney injury and hyperkalemia.  He also underwent paracentesis that showed elevated white blood cell count and he was placed on ceftriaxone, Flagyl and he was also placed on norepinephrine due to hypotension.  He was also started on Lasix drip and nephrology was consulted.    On June 28, 2024 intensivist Dr. Weaver request to transfer care to hospitalist service.    I everted and examined him at the bedside.  He reported that he feels shaky but overall he is feeling better.  Nephrology has been following him.  He has been requiring Levophed to maintain his blood pressure and he is also requiring Lasix gtt.  I discussed plan of care with patient and her wife at the bedside and answered all of their questions.  I discussed plan of care with bedside RN and ICU.            Review of Systems  Review of Systems   Constitutional:  Positive for malaise/fatigue. Negative for chills, fever and weight loss.   HENT:  Negative for hearing loss and tinnitus.    Eyes:  Negative for blurred vision, double vision, photophobia and pain.   Respiratory:  Negative for cough, sputum production and shortness of breath.    Cardiovascular:   Negative for chest pain, palpitations, orthopnea and leg swelling.   Gastrointestinal:  Negative for abdominal pain, constipation, diarrhea, nausea and vomiting.   Genitourinary:  Negative for dysuria, frequency and urgency.   Musculoskeletal:  Negative for back pain, joint pain, myalgias and neck pain.   Skin:  Negative for rash.   Neurological:  Negative for dizziness, tingling, tremors, sensory change, speech change, focal weakness and headaches.   Psychiatric/Behavioral:  Negative for hallucinations and substance abuse.    All other systems reviewed and are negative.      Past Medical History   has no past medical history on file.    Surgical History   has a past surgical history that includes pr ercp,diagnostic (N/A, 6/4/2024) and egd w/endoscopic ultrasound (N/A, 6/4/2024).    Family History  family history is not on file.    Social History       Medications  Prior to Admission Medications   Prescriptions Last Dose Informant Patient Reported? Taking?   furosemide (LASIX) 40 MG Tab  Patient Yes No   Sig: Take 40 mg by mouth 1 time a day as needed. Indications: Edema   gabapentin (NEURONTIN) 300 MG Cap  Patient Yes No   Sig: Take 300 mg by mouth 2 times a day.   lisinopril (PRINIVIL) 10 MG Tab  Patient Yes No   Sig: Take 10 mg by mouth every day.   multivitamin Tab  Patient Yes No   Sig: Take 1 Tablet by mouth every day.   omeprazole (PRILOSEC) 40 MG delayed-release capsule  Patient Yes No   Sig: Take 40 mg by mouth every day.   ondansetron (ZOFRAN ODT) 4 MG TABLET DISPERSIBLE  Patient Yes No   Sig: Take 4 mg by mouth every 6 hours as needed for Nausea/Vomiting.   polyethylene glycol/lytes (MIRALAX) Pack   No No   Sig: Take 1 Packet by mouth 1 time a day as needed (if sennosides and docusate ineffective after 24 hours).   riFAXIMin (XIFAXAN) 550 MG Tab tablet  Patient Yes No   Sig: Take 500 mg by mouth every day. No known stop date   senna-docusate (PERICOLACE OR SENOKOT S) 8.6-50 MG Tab   No No   Sig: Take 2  Tablets by mouth 2 times a day.   spironolactone (ALDACTONE) 25 MG Tab   Yes No   Sig: Take 25 mg by mouth every day.   traZODone (DESYREL) 50 MG Tab  Patient Yes No   Sig: Take 50 mg by mouth every evening.      Facility-Administered Medications: None       Allergies  No Known Allergies    Physical Exam  Temp:  [36.2 °C (97.2 °F)-36.6 °C (97.8 °F)] 36.5 °C (97.7 °F)  Pulse:  [] 85  Resp:  [7-32] 18  BP: ()/(46-82) 117/56  SpO2:  [92 %-98 %] 96 %    Physical Exam  Vitals reviewed.   Constitutional:       General: He is not in acute distress.     Appearance: He is ill-appearing.   HENT:      Head: Normocephalic and atraumatic. No contusion.      Right Ear: External ear normal.      Left Ear: External ear normal.      Nose: Nose normal.      Mouth/Throat:      Pharynx: No oropharyngeal exudate.   Eyes:      General:         Right eye: No discharge.         Left eye: No discharge.      Pupils: Pupils are equal, round, and reactive to light.   Cardiovascular:      Rate and Rhythm: Normal rate and regular rhythm.      Heart sounds: No murmur heard.     No friction rub. No gallop.      Comments: Central line  Pulmonary:      Effort: Pulmonary effort is normal.      Breath sounds: No wheezing or rhonchi.   Abdominal:      General: Bowel sounds are normal. There is no distension.      Palpations: Abdomen is soft.      Tenderness: There is no abdominal tenderness. There is no rebound.   Musculoskeletal:         General: No swelling or tenderness. Normal range of motion.      Cervical back: No rigidity. No muscular tenderness.      Right lower leg: Edema present.      Left lower leg: Edema present.   Skin:     General: Skin is warm and dry.      Coloration: Skin is not jaundiced.   Neurological:      General: No focal deficit present.      Mental Status: He is alert and oriented to person, place, and time.      Cranial Nerves: No cranial nerve deficit.      Sensory: No sensory deficit.   Psychiatric:         Mood  and Affect: Mood normal.         Fluids  Date 06/20/24 0700 - 06/21/24 0659   Shift 8214-0530 7149-3793 3183-1762 24 Hour Total   INTAKE   P.O. 460   460   I.V. 244.3   244.3   IV Piggyback 354   354   Shift Total 1058.3   1058.3   OUTPUT   Urine 210   210   Shift Total 210   210   Weight (kg) 129.6 129.6 129.6 129.6       Laboratory  Recent Labs     06/18/24  0615 06/19/24  0600 06/20/24  0520   WBC 20.7* 14.7* 11.6*   RBC 2.85* 3.14* 2.93*   HEMOGLOBIN 9.4* 10.3* 9.7*   HEMATOCRIT 26.9* 28.9* 26.4*   MCV 94.4 92.0 90.1   MCH 33.0 32.8 33.1*   MCHC 34.9 35.6 36.7*   RDW 53.4* 51.9* 50.1*   PLATELETCT 245 169 127*   MPV 10.7 10.6 10.4     Recent Labs     06/20/24  0000 06/20/24  0520 06/20/24  1115   SODIUM 119* 123* 123*   POTASSIUM 4.7 4.9 5.0   CHLORIDE 85* 86* 86*   CO2 16* 17* 17*   GLUCOSE 109* 100* 94   BUN 91* 90* 92*   CREATININE 8.49* 7.62* 6.97*   CALCIUM 7.5* 7.6* 7.6*     Recent Labs     06/18/24  0415   INR 1.73*                 Imaging  US-LIVER AND VESSELS LTD (TIPS) (COMBO)   Final Result      1. Patent TIPS shunt.   2. Cirrhotic morphology of the liver and ascites.   3. Sludge and stones in the gallbladder lumen. Gallbladder wall thickening is secondary to adjacent ascites.      OUTSIDE IMAGES-CT ABDOMEN /PELVIS   Final Result      EC-ECHOCARDIOGRAM LTD W/O CONT    (Results Pending)       Assessment/Plan  * Acute renal failure (ARF) (HCC)- (present on admission)  Assessment & Plan  Normal creatinine when discharged on 6/8/2024, up to 9.7 on admit but improving now 7.6  I am continuing Lasix drip 5 mg/hr  Bicarb gtt. now discontinued.  Nephrology has been following him  ? Type I hepatorenal vs ATN from sepsis  Norepinephrine and he has been receiving albumin.  I am continuing entering Levophed as per target MAP of greater than 65 mmHg or systolic blood pressure of greater than 90 mmHg.  I discussed plan of care with intensivist and bedside RN and ICU.  Now plan is to transfer him to [facility].    Septic  shock (HCC)- (present on admission)  Assessment & Plan  This is Septic shock Present on admission  SIRS criteria identified on my evaluation include: Tachycardia, with heart rate greater than 90 BPM, Tachypnea, with respirations greater than 20 per minute, and Leukocytosis, with WBC greater than 12,000  Clinical indicators of end organ dysfunction include Hypotension with systolic blood pressure less than 90 or MAP less than 65 and Lactic Acid greater than 2  Indicators of septic shock include: Sepsis present and persistent hypotension despite fluid resuscitation   Sources is: Possibly bacterial peritonitis  Sepsis protocol initiated  Crystalloid Fluid Administration: Fluid resuscitation ordered per standard protocol - 30 mL/kg per current or ideal body weight.  Given at outside hospital  IV antibiotics as appropriate for source of sepsis  Reassessment: I have reassessed the patient's hemodynamic status  Pancultures ordered, follow-up culture from [facility] and [facility]    6/18: wbc 20, levophed 0.22 mcgs  6/19: wbc 14.7, levophed and vaopressin    6/20 Now is requiring Levophed to maintain blood pressure.  Continue ceftriaxone.  I discussed plan of care with intensivist no plans to transfer him to [facility].  Follow-up on culture results.    Hyponatremia- (present on admission)  Assessment & Plan  Acute on chronic, hypervolemic  Admitted with 117  Due to cirrhosis  Current sodium level is 123  Continue monitor sodium level closely  Nephrology has been following him.    Cirrhosis (HCC)- (present on admission)  Assessment & Plan  History of cirrhosis, MELD on admit 33  Etiology: Hereditary hemochromatosis versus EtOH  Status post TIPS  I am continuing norepinephrine  Ultrasound showed TIPS shunt patent  Panculture negative including para * 2 on ceftriaxone (SBP by wbc criteria)  Continue to monitor closely  Now plan is to transfer him to IM.    Hereditary hemochromatosis (HCC)- (present on admission)  Assessment & Plan  Possible  history of  Iron 62  Has required phlebotomy in the past however that was 6 to 7 years ago  Continue monitor CBC to evaluate for hemoglobin current hemoglobin is 9.7.    Pancreatitis- (present on admission)  Assessment & Plan  History of possible gallstone pancreatitis  Pancreatitis  with levels 697 on admit  He denies complaint of severe abdominal pain.  Now resume diet.      I personally discussed case with intensivist Dr. Weaver regarding Mr. Gaines's current medical condition and plan of care.    I discussed plan of care with bedside RN and ICU.    Patient is critically ill.   The patient continues to have: Hypotension  The vital organ system that is affected is the: Cardiovascular system  If untreated there is a high chance of deterioration into: Cardiovascular collapse  And eventually death.   The critical care that I am providing today is: I am continuing titrating Levophed as per target MAP of greater than 65 mmHg or systolic blood pressure greater than 90 mmHg.  The critical that has been undertaken is medically complex.   There has been no overlap in critical care time.   Critical Care Time not including procedures: 41 minutes

## 2024-06-20 NOTE — PROGRESS NOTES
[facility] -  PROGRESS NOTE               Author: Winifred Zamora M.D. Date & Time: 6/20/2024  9:17 AM     HPI:  53 y.o. male with h/o HTN, hereditary hemochromatosis, cirrhosis complicated by ascites requiring TIPS, who presented to outside hospital with c/o generalized weakness, nausea, vomiting, decreased p.o. intake as well as  decreased urine output for the past several days.  He was found to be hypotensive 60 mmHg SBP on arrival .   Initial labs noted for Na 119, K 5.4 bicarb 13 and elevated Cr. Pt had diagnostic paracentetics with evidence of SBP.   Pt treated with 2 L of crystalloids, albumin 50 g and IV ceftriaxone and Flagyl.  He was transferred to [facility] for further evaluation and continuation of care.  Lab work upon arrival to [facility] showed WBC 21, S Na 117 ( 6/18 2.30 ) improved to 119 on AM labs, K 5.4, CO2 12, BUN 85, Cr 8.8, total bilirubin 2.9, lipase 697, urine sodium less than 20, UA moderate bilirubin positive nitrite, 3-5 WBCs 0-2 RBC and few bacteria  Blood pressure has since improved 90s to 100 systolic, he is on room air.  No urine output documented so far.  Patient is currently receiving albumin 25 g every 6 hours, and on norepinephrine .  Of note he was recently hospitalized for  gallstone pancreatitis status post ERCP and sphincterotomy, hospital course complicated by BACILIO in the setting of IV contrast and pancreatitis. He was seen by nephrology at th time.  Cr has  increased to 2.1 from 0.8 2/1.Cr has improved to baseline 08 6/8.   Pt has hx of chronic hyponatremia with serum sodium 130-134 during last admission   S Na . Pt was discharged on Lasix, spirolactone, lisinopril. Other pertinent home meds gabapentin and Trazodone.   Pt is alert and oriented, but  slow to answer questions. He has been taking motrin daily for abdominal pain following recent hospital admission. Pt says he has not used any ETOH at least for the past year or so.   No  "F/C/CP/SOB.  No melena, hematochezia, hematemesis.  No HA, visual changes.    DAILY NEPHROLOGY SUMMARY:  6/18: consult done   6/19: No acute events, more alert today, answering questions appropriately,  cc/ 24 hrs, received lasix 100 mg earlier today, seem to be responding to IV lasix,  cc s/p lasix. Increase abdominal distention, wife at bed side, on vasopressor and NE, on room air, plan for paracentesis today   6/20: no acute events, UOP 3375 cc/24 hrs, on clears,on low dose NE,s/p paracentesis yesterday 6 L removed, wife at bed side, S Na improved to 123 appropriate rise, Cr improved to 7.62, per RN UOP slowing down 60 cc/ hr since am  appetite still poor.     REVIEW OF SYSTEMS:    10 point ROS reviewed and is as per HPI/daily summary or otherwise negative    PMH/PSH/SH/FH:  Reviewed and unchanged since admission note    CURRENT MEDICATIONS:  Reviewed from admission to present day    VS:  /51   Pulse 81   Temp 36.5 °C (97.7 °F) (Temporal)   Resp 20   Ht 1.905 m (6' 3\")   Wt (!) 130 kg (285 lb 11.5 oz)   SpO2 97%   BMI 35.71 kg/m²   Physical Exam  Constitutional:       General: He is not in acute distress.     Appearance: He is not ill-appearing.   HENT:      Head: Normocephalic.      Mouth/Throat:      Mouth: Mucous membranes are moist.   Eyes:      General: Scleral icterus present.   Cardiovascular:      Rate and Rhythm: Normal rate.   Pulmonary:      Effort: Pulmonary effort is normal.   Abdominal:      General: There is distension.   Musculoskeletal:         General: Swelling present.   Skin:     General: Skin is warm.   Neurological:      Mental Status: He is oriented to person, place, and time.   Psychiatric:         Mood and Affect: Mood normal.         Fluids:    Intake/Output Summary (Last 24 hours) at 6/20/2024 0917  Last data filed at 6/20/2024 0800  Gross per 24 hour   Intake 3432.7 ml   Output 9805 ml   Net -6372.3 ml       LABS:  Labs reviewed, pertinent labs " below.    Recent Labs     06/18/24  0615 06/19/24  0600 06/20/24  0520   WBC 20.7* 14.7* 11.6*   RBC 2.85* 3.14* 2.93*   HEMOGLOBIN 9.4* 10.3* 9.7*   HEMATOCRIT 26.9* 28.9* 26.4*   MCV 94.4 92.0 90.1   MCH 33.0 32.8 33.1*   MCHC 34.9 35.6 36.7*   RDW 53.4* 51.9* 50.1*   PLATELETCT 245 169 127*   MPV 10.7 10.6 10.4       Recent Labs     06/19/24  1735 06/20/24  0000 06/20/24  0520   SODIUM 120* 119* 123*   POTASSIUM 4.8 4.7 4.9   CHLORIDE 84* 85* 86*   CO2 15* 16* 17*   GLUCOSE 115* 109* 100*   BUN 88* 91* 90*   CREATININE 8.37* 8.49* 7.62*   CALCIUM 7.7* 7.5* 7.6*        IMAGING:  All imaging reviewed from admission to present day      IMPRESSION:  # BACILIO oliguric   - Suspect sec to  ischemic ATN in the setting of unstable hemodynamics, decrease EABV, septic shock, SBP, NSAIDs, and bile cast nephropathy   - Low suspicion for HRS based on clinical presentation   - No evidence of obstruction on CT from outside hospital   # Cirrhosis, MELD 33   - Possible etiologies hereditary hemochromatosis versus history of alcoholism   # Ascites   # Hyponatremia   - Suspect sec to cirrhosis, decreased EABV   - Also gabapentin and trazodone can alter ADH secretion and may have contributed   # Septic shock likely sec to SBP   # Metabolic acidosis   # Pancreatitis   # SBP   # Hypervolemia, total body volume overload with decrease EABV  # Thrombocytopenia   # Low serum albumin         SUGGESTIONS:  - No compelling indication for RRT  - Daily evaluation for RRT needs, pt agreeable to dialysis if needed ever.   - Continue vasopressors to maintain MAP > 65 mmHg  - Raise serum Na in small increments 4-6 mEq/L in 24 hrs as pt is at risk for ODS  - Maintain serum Na at 130 mEq/L until 6/21 6 AM   - Ct Lasix gtt   - Monitor serum Na q 6  hrs   - DC  isotonic bicarb 50 cc/ hr   - Increase PO bicarb 1300 mg bid   - Resume IV albumin 25 g tid x 2 days   - Ab per primary svc   - Avoid nephrotoxins   - Adjust meds for renal function   - Holding   gabapentin and trazodone  - Limit fluid intake 1200 cc/d   - Ca acetate 667 mg tid ac      Discussed with RN

## 2024-06-20 NOTE — PROGRESS NOTES
0615: Paged [facility] regarding Na result of 123 per nursing communication orders.     0629: Received call back from Dr. West - notified of patients sodium result of 123. No new orders received.

## 2024-06-20 NOTE — CARE PLAN
The patient is Watcher - Medium risk of patient condition declining or worsening    Shift Goals  Clinical Goals: MAP > 65, Q6 BMP, I's & O's  Patient Goals: Sleep  Family Goals: Rest, updates    Progress made toward(s) clinical / shift goals:    Problem: Pain - Standard  Goal: Alleviation of pain or a reduction in pain to the patient’s comfort goal  Outcome: Progressing     Problem: Skin Integrity  Goal: Skin integrity is maintained or improved  Outcome: Progressing     Problem: Fall Risk  Goal: Patient will remain free from falls  Outcome: Progressing       Patient is not progressing towards the following goals:      Problem: Hemodynamics  Goal: Patient's hemodynamics, fluid balance and neurologic status will be stable or improve  Outcome: Not Progressing  Note: Intermittent confusion throughout this shift, remains on levophed drip

## 2024-06-20 NOTE — DISCHARGE PLANNING
CM reviewed chart and patient was discussed in IDT rounds and he is transferring to [facility] when there is a bed.    CM provided a letter for their son Fred for work and faxed it to [facility] [employer] [facility] [employer] in [place] where he works.    No changes to the discharge plan noted.

## 2024-06-20 NOTE — ASSESSMENT & PLAN NOTE
Tunneled Hemodialysis catheter placement 6/22  Consulted Palliative care - very poor prognosis  Comfort care

## 2024-06-20 NOTE — CARE PLAN
The patient is Watcher - Medium risk of patient condition declining or worsening    Shift Goals  Clinical Goals: MAP>65, correct NA slowly  Patient Goals: Comfort and rest  Family Goals: Updates    Progress made toward(s) clinical / shift goals:  All questions answered.  Lasix infusion started with good results  Problem: Knowledge Deficit - Standard  Goal: Patient and family/care givers will demonstrate understanding of plan of care, disease process/condition, diagnostic tests and medications  Outcome: Progressing     Problem: Urinary - Renal Perfusion  Goal: Ability to achieve and maintain adequate renal perfusion and functioning will improve  Outcome: Progressing       Patient is not progressing towards the following goals: Remains on Levophed infusion.  Vasopressin off      Problem: Hemodynamics  Goal: Patient's hemodynamics, fluid balance and neurologic status will be stable or improve  Outcome: Not Progressing

## 2024-06-20 NOTE — PROGRESS NOTES
"Pulmonary Progress Note    Date of admission  6/18/2024    Chief Complaint  53 y.o. male admitted 6/18/2024 with Tx from [facility] for SBP    Hospital Course  \"53 y.o. male with past medical history of cirrhosis possibly due to hereditary hemochromatosis versus alcohol as well as gallstone pancreatitis (evaluated at [facility] and [facility] and both times surgery turned down due to cirrhosis with elevated MELD) who presented 6/18/2024 as a transfer from [facility] with acute kidney injury, hyponatremia and leukocytosis.  The patient was recently admitted at [facility] where he was found to have pancreatitis and gallstones.  An ERCP was done with sphincterotomy and no obstruction was identified.  The patient followed up with Dr. Noriega and has an appointment to see the hepatology clinic at [place] on 12th of July.  He presented to the outside hospital for generalized malaise, nausea, vomiting, inability to take oral fluids, decreased urine output and increased swelling.  He recently had a paracentesis done about 5 days ago.  He was found to have a leukocytosis with a white count of 18.8, mild anemia with a hemoglobin of 11.4, normal platelet count at 228.  Metabolic panel showed moderate hyponatremia at 119, hyperkalemia at 5.4 (he received insulin, dextrose and calcium) his creatinine was also noted to be markedly elevated at 10.41 up from 2.83 six days ago.  He underwent a paracentesis which showed a WBC count of 5750 and a RBC count of 8700.  He was initiated on Rocephin, Flagyl and norepinephrine.  He was given IV fluids and albumin and transferred to our facility for higher level of care.     The patient states he has remained compliant with his Lasix and spironolactone and has not drank any alcohol since February.\" From Dr. Ware's H and P    6/19: paracentesis 6 L removed; started on lasix drip  6/20: off vasopressin, levophed titrating " down    Interval Problem Update  Reviewed last 24 hour events:  Chart review from the past 24 hours includes imaging, laboratory studies, vital signs and notes available.  Pertinent data for today    Cardiac:   levophed 0.18  Pulm:  RA 98%  Neuro:  intact   Heme:  28  I/O:  3500 cc urine; creatinine 7.6  ID: ceftriaxone   GI/endo:  taking po, nucleated cells 1830  Labs/Imaging:  na 123,micro of paracentesis negative  Lines:  RIJ  Mobility:  2  Symptoms: abdominal discomfort      Review of Systems  Review of Systems   Constitutional:  Positive for malaise/fatigue.   HENT: Negative.     Eyes: Negative.    Respiratory: Negative.     Cardiovascular: Negative.    Gastrointestinal:  Positive for abdominal pain.   Genitourinary: Negative.    Musculoskeletal: Negative.    Skin: Negative.    Neurological:  Positive for weakness.   Endo/Heme/Allergies: Negative.    Psychiatric/Behavioral: Negative.          Vital Signs for last 24 hours   Temp:  [36.2 °C (97.1 °F)-36.6 °C (97.8 °F)] 36.5 °C (97.7 °F)  Pulse:  [] 81  Resp:  [7-56] 20  BP: ()/(46-68) 101/51  SpO2:  [92 %-99 %] 97 %           Physical Exam   Physical Exam  Constitutional:       Appearance: He is ill-appearing.   HENT:      Head: Normocephalic and atraumatic.   Eyes:      General: Scleral icterus present.      Extraocular Movements: Extraocular movements intact.      Pupils: Pupils are equal, round, and reactive to light.   Cardiovascular:      Rate and Rhythm: Normal rate.   Pulmonary:      Effort: Pulmonary effort is normal.      Breath sounds: Normal breath sounds.   Abdominal:      General: There is distension.      Tenderness: There is abdominal tenderness. There is no guarding or rebound.   Musculoskeletal:      Right lower leg: Edema present.      Left lower leg: Edema present.   Neurological:      General: No focal deficit present.      Mental Status: He is alert and oriented to person, place, and time.      Comments: Much more awake    Psychiatric:         Mood and Affect: Mood normal.         Behavior: Behavior normal.         Thought Content: Thought content normal.         Judgment: Judgment normal.         Medications  Current Facility-Administered Medications   Medication Dose Route Frequency Provider Last Rate Last Admin    furosemide (Lasix) 100 mg in  mL infusion  5 mg/hr Intravenous Continuous Barbra Adrian M.D. 5 mL/hr at 06/20/24 0000 5 mg/hr at 06/20/24 0000    sodium bicarbonate 8.4 % 150 mEq in sterile water 1,000 mL infusion  150 mEq Intravenous Continuous Barbra Adrian M.D. 50 mL/hr at 06/20/24 0214 150 mEq at 06/20/24 0214    sodium bicarbonate tablet 650 mg  650 mg Oral TID Barbra Adrian M.D.   650 mg at 06/20/24 0837    lidocaine (Xylocaine) 1 % injection 20 mL  20 mL Other Once Barbra Adrian M.D.        calcium carbonate (Tums) chewable tab 500 mg  500 mg Oral BID Winifred Zamora M.D.   500 mg at 06/20/24 0507    riFAXIMin (Xifaxan) tablet 550 mg  550 mg Oral DAILY Arturo Ware Jr., D.O.   550 mg at 06/20/24 0507    omeprazole (PriLOSEC) capsule 40 mg  40 mg Oral DAILY Arturo Ware Jr., D.O.   40 mg at 06/20/24 0507    heparin injection 5,000 Units  5,000 Units Subcutaneous Q8HRS Arturo Ware Jr., D.O.   5,000 Units at 06/20/24 0507    ondansetron (Zofran) syringe/vial injection 4 mg  4 mg Intravenous Q4HRS PRN Arturo Ware Jr., D.O.   4 mg at 06/18/24 1922    ondansetron (Zofran ODT) dispertab 4 mg  4 mg Oral Q4HRS PRN Arturo Ware Jr., D.O.        promethazine (Phenergan) tablet 12.5-25 mg  12.5-25 mg Oral Q4HRS PRN Arturo Ware Jr., D.O.        promethazine (Phenergan) suppository 12.5-25 mg  12.5-25 mg Rectal Q4HRS PRN Arturo Ware Jr., D.O.        prochlorperazine (Compazine) injection 5-10 mg  5-10 mg Intravenous Q4HRS PRN Arturo Ware Jr., D.O.        norepinephrine (Levophed) 8 mg in 250 mL NS infusion (premix)  0-1 mcg/kg/min (Ideal) Intravenous  Continuous ABDELRAHMAN Torres Jr..O. 28.5 mL/hr at 06/20/24 0200 0.18 mcg/kg/min at 06/20/24 0200    vasopressin (Vasostrict) 20 Units in  mL Infusion  0.03 Units/min Intravenous Continuous ABDELRAHMAN Torres Jr..O.   Stopped at 06/19/24 1600    LR (Bolus) infusion 500 mL  500 mL Intravenous Once PRN ABDELRAHMAN Torres Jr..O.        cefTRIAXone (Rocephin) syringe 2,000 mg  2,000 mg Intravenous DAILY ABDELRAHMAN Torres Jr..O.   2,000 mg at 06/20/24 0509    benzocaine-menthol (Cepacol) lozenge 1 Lozenge  1 Lozenge Mouth/Throat Q2HRS PRN Lida L. Latona   1 Lozenge at 06/19/24 0000    HYDROmorphone (Dilaudid) injection 0.2 mg  0.2 mg Intravenous Q3HRS PRN Lida L. Latona   0.2 mg at 06/20/24 0636    Or    HYDROmorphone (Dilaudid) injection 0.5 mg  0.5 mg Intravenous Q3HRS PRN Lida L. Latona   0.5 mg at 06/20/24 0323       Fluids    Intake/Output Summary (Last 24 hours) at 6/20/2024 0956  Last data filed at 6/20/2024 0800  Gross per 24 hour   Intake 3432.7 ml   Output 9805 ml   Net -6372.3 ml       Laboratory          Recent Labs     06/18/24  2340 06/19/24  0600 06/19/24  1133 06/19/24  1735 06/20/24  0000 06/20/24  0520   SODIUM 117* 118* 119* 120* 119* 123*   POTASSIUM 5.4 5.2 5.3 4.8 4.7 4.9   CHLORIDE 84* 83* 83* 84* 85* 86*   CO2 13* 13* 14* 15* 16* 17*   BUN 88* 92* 89* 88* 91* 90*   CREATININE 9.71* 8.92* 9.21* 8.37* 8.49* 7.62*   MAGNESIUM 1.7  --  2.2  --  2.2  --    PHOSPHORUS 8.2* 7.3* 7.7*  --  7.9*  --    CALCIUM 7.5* 7.4* 7.4* 7.7* 7.5* 7.6*     Recent Labs     06/18/24  0230 06/18/24  0615 06/18/24  1147 06/19/24  0600 06/19/24  1133 06/19/24  1735 06/20/24  0000 06/20/24  0520   ALTSGPT 23 19  --  20  --   --   --  20   ASTSGOT 49* 45  --  51*  --   --   --  45   ALKPHOSPHAT 134* 126*  --  141*  --   --   --  148*   TBILIRUBIN 2.9* 2.8*  --  2.5*  --   --   --  2.7*   LIPASE 697*  --   --   --   --   --   --   --    GLUCOSE 122* 113*   < > 102*   < > 115* 109* 100*    < > = values in this  interval not displayed.     Recent Labs     06/18/24  0615 06/19/24  0600 06/20/24  0520   WBC 20.7* 14.7* 11.6*   NEUTSPOLYS 82.50* 80.00* 80.40*   LYMPHOCYTES 5.00* 5.80* 5.00*   MONOCYTES 10.20 12.00 12.40   EOSINOPHILS 0.70 0.70 0.70   BASOPHILS 0.20 0.10 0.20   ASTSGOT 45 51* 45   ALTSGPT 19 20 20   ALKPHOSPHAT 126* 141* 148*   TBILIRUBIN 2.8* 2.5* 2.7*     Recent Labs     06/18/24  0230 06/18/24  0415 06/18/24  0615 06/19/24  0600 06/20/24  0520   RBC 3.39*  --  2.85* 3.14* 2.93*   HEMOGLOBIN 11.1*  --  9.4* 10.3* 9.7*   HEMATOCRIT 32.3*  --  26.9* 28.9* 26.4*   PLATELETCT 238  --  245 169 127*   PROTHROMBTM  --  20.4*  --   --   --    INR  --  1.73*  --   --   --    IRON 62  --   --   --   --    TOTIRONBC 115*  --   --   --   --        Im     Assessment/Plan  * Acute renal failure (ARF) (HCC)- (present on admission)  Assessment & Plan  Normal creatinine when discharged on 6/8/2024, up to 9.7 on admit but improving now 7.6  Lasix drip 5 mg/hr  Bicarb drip   Appreciate nephology consult    ? Type I hepatorenal vs ATN from sepsis  Norepinephrine (received albumin)  Renal dose meds, avoid nephrotoxins  Strict I/Os  Urine Na 21  Kidney morphology normal on CT from outside hospital    Septic shock (HCC)- (present on admission)  Assessment & Plan  This is Septic shock Present on admission  SIRS criteria identified on my evaluation include: Tachycardia, with heart rate greater than 90 BPM, Tachypnea, with respirations greater than 20 per minute, and Leukocytosis, with WBC greater than 12,000  Clinical indicators of end organ dysfunction include Hypotension with systolic blood pressure less than 90 or MAP less than 65 and Lactic Acid greater than 2  Indicators of septic shock include: Sepsis present and persistent hypotension despite fluid resuscitation   Sources is: Possibly bacterial peritonitis  Sepsis protocol initiated  Crystalloid Fluid Administration: Fluid resuscitation ordered per standard protocol - 30 mL/kg  per current or ideal body weight.  Given at outside hospital  IV antibiotics as appropriate for source of sepsis  Reassessment: I have reassessed the patient's hemodynamic status  Pancultures ordered, follow-up culture from [facility]    6/18: wbc 20, levophed 0.22 mcgs  6/19: wbc 14.7, levophed and vaopressin  6/20: wbc 11.6, levophed 0.18 mcgs, cultures negative and on ceftriaxone    Hyponatremia- (present on admission)  Assessment & Plan  Acute on chronic, hypervolemic  Admitted with 117  Due to cirrhosis  Slowly corrected to 123   BMP q6  Strict I/Os    Cirrhosis (HCC)- (present on admission)  Assessment & Plan  History of cirrhosis, MELD on admit 33  Etiology: Hereditary hemochromatosis versus EtOH  Status post TIPS  norepinephrine  Ultrasound showed TIPS shunt patent  Panculture negative including para * 2 on ceftriaxone (SBP by wbc criteria)    Hereditary hemochromatosis (HCC)- (present on admission)  Assessment & Plan  Possible history of  Iron 62  Has required phlebotomy in the past however that was 6 to 7 years ago    Pancreatitis- (present on admission)  Assessment & Plan  History of possible gallstone pancreatitis  Pancreatitis  with levels 697 on admit         VTE:  Heparin  Ulcer: Not Indicated  Lines: Central Line  Ongoing indication addressed    I have performed a physical exam and reviewed and updated ROS and Plan today (6/20/2024). In review of yesterday's note (6/19/2024), there are no changes except as documented above.     Discussed patient condition and risk of morbidity and/or mortality with RN, Pharmacy, Charge nurse / hot rounds, and Patient and his wife  The patient remains critically ill.  Critical care time = 31 minutes in directly providing and coordinating critical care and extensive data review.  No time overlap and excludes procedures.

## 2024-06-21 NOTE — CARE PLAN
Problem: Knowledge Deficit - Standard  Goal: Patient and family/care givers will demonstrate understanding of plan of care, disease process/condition, diagnostic tests and medications  6/21/2024 0114 by Lizette Duran R.N.  Outcome: Progressing  6/21/2024 0114 by Lizette Duran R.N.  Outcome: Progressing     Problem: Pain - Standard  Goal: Alleviation of pain or a reduction in pain to the patient’s comfort goal  6/21/2024 0114 by Lizette Duran R.N.  Outcome: Progressing  6/21/2024 0114 by Lizette Duran R.N.  Outcome: Progressing     Problem: Hemodynamics  Goal: Patient's hemodynamics, fluid balance and neurologic status will be stable or improve  6/21/2024 0114 by Lizette Duran R.N.  Outcome: Progressing  6/21/2024 0114 by Lizette Duran R.N.  Outcome: Progressing     Problem: Fluid Volume  Goal: Fluid volume balance will be maintained  6/21/2024 0114 by Lizette Duran R.N.  Outcome: Progressing  6/21/2024 0114 by Lizette Duran R.N.  Outcome: Progressing     Problem: Urinary - Renal Perfusion  Goal: Ability to achieve and maintain adequate renal perfusion and functioning will improve  Outcome: Progressing     Problem: Respiratory  Goal: Patient will achieve/maintain optimum respiratory ventilation and gas exchange  6/21/2024 0114 by Lizette Duran R.N.  Outcome: Progressing  6/21/2024 0114 by Lizette Duran R.N.  Outcome: Progressing     Problem: Skin Integrity  Goal: Skin integrity is maintained or improved  Outcome: Progressing     Problem: Fall Risk  Goal: Patient will remain free from falls  Outcome: Progressing   The patient is Watcher - Medium risk of patient condition declining or worsening    Shift Goals  Clinical Goals: map >65  Patient Goals: pain control  Family Goals: updates    Progress made toward(s) clinical / shift goals:      Patient is not progressing towards the following goals:

## 2024-06-21 NOTE — WOUND TEAM
[facility]  Inpatient Services  Wound and Skin Care Brief Evaluation    Admission Date: 6/18/2024     Last order of IP CONSULT TO WOUND CARE was found on 6/21/2024 from Hospital Encounter on 6/17/2024     HPI, PMH, SH: Reviewed    No chief complaint on file.    Diagnosis: Acute renal failure (ARF) (HCC) [N17.9]    Unit where seen by Wound Team: [id number]     Wound consult placed regarding sacrum. Chart and images reviewed. This discussed with bedside RN. This clinician in to assess patient. Patient pleasant and agreeable. Patient sacrum with moisture related redness. Sacral offloading foam applied to absorb moisture. May use barrier paste for protective layer if patient is incontinent.     No pressure injuries or advanced wound care needs identified. Wound consult completed. No further follow up unless indicated and consulted.     Moisture Associated Skin Damage 06/21/24 Midline Sacrum (Active)   First Observed Date/First Observed Time: 06/21/24 1100   Present on Original Admission: Yes  Wound Orientation: Midline  Wound Location : Sacrum      Assessments 6/21/2024 11:00 AM   Wound Image     NEXT Weekly Photo (Inpatient Only) 06/28/24   Drainage Amount None   Periwound Assessment Blanchable erythema   Periwound Protectant Moisture Barrier   IAD Containment Device Indwelling Catheter   WOUND NURSE ONLY - Time Spent with Patient (mins) 20       PREVENTATIVE INTERVENTIONS:    Q shift Travis - performed per nursing policy  Q shift pressure point assessments - performed per nursing policy    Surface/Positioning  ICU Low Airloss - Currently in Place  Reposition q 2 hours - Currently in Place    Offloading/Redistribution  Sacral offloading dressing (Silicone dressing) - Applied this Visit  Heel offloading dressing (Silicone dressing) - Ordered  Float Heels off Bed with Pillows - Currently in Place           Containment/Moisture Prevention    Vazquez Catheter - Currently in Place

## 2024-06-21 NOTE — PROGRESS NOTES
4 Eyes Skin Assessment Completed by SAMMY Bauer and SAMMY Castillo.    Head WDL  Ears Redness and Blanching          Nose WDL  Mouth WDL  Neck WDL  Breast/Chest WDL  Shoulder Blades WDL  Spine WDL  (R) Arm/Elbow/Hand WDL  (L) Arm/Elbow/Hand Redness and Rash discoloration    Abdomen Bruising left para site leaking, swelling, edema, weeping  Groin WDL  Scrotum/Coccyx/Buttocks Redness and Non-Blanching bruising          (R) Leg WDL    (L) Leg Bruising    (R) Heel/Foot/Toe Redness, Blanching, Boggy, Bruising, and Swelling edema          (L) Heel/Foot/Toe Redness, Blanching, Boggy, Bruising, and Edema                  Devices In Places ECG, Blood Pressure Cuff, Pulse Ox, Vazquez, SCD's, Central Line, and Nasal Cannula      Interventions In Place NC W/Ear Foams, TAP System, Pillows, Q2 Turns, Low Air Loss Mattress, and Heels Loaded W/Pillows    Possible Skin Injury yes    Pictures Uploaded Into Epic Yes  Wound Consult Placed Yes  RN Wound Prevention Protocol Ordered Yes

## 2024-06-21 NOTE — CARE PLAN
The patient is Stable - Low risk of patient condition declining or worsening    Shift Goals  Clinical Goals: Pt will remain hemodynamially stable during shift  Patient Goals: pain control  Family Goals: updates    Progress made toward(s) clinical / shift goals:        Problem: Hemodynamics  Goal: Patient's hemodynamics, fluid balance and neurologic status will be stable or improve  Outcome: Progressing  Note: BP stabilized, VS taken Q2 hours, pt on continuous cardiac monitoring. Daily labs drawn.      Problem: Pain - Standard  Goal: Alleviation of pain or a reduction in pain to the patient’s comfort goal  Outcome: Progressing  Note: Pt assessed for pain regularly and medicated PRN per MAR.      Problem: Knowledge Deficit - Standard  Goal: Patient and family/care givers will demonstrate understanding of plan of care, disease process/condition, diagnostic tests and medications  Outcome: Progressing  Note: Pt educated regarding plan of care and medications. All questions answered.

## 2024-06-21 NOTE — CARE PLAN
The patient is Watcher - Medium risk of patient condition declining or worsening    Shift Goals  Clinical Goals: map >65  Patient Goals: pain control  Family Goals: updates    Progress made toward(s) clinical / shift goals:    Problem: Knowledge Deficit - Standard  Goal: Patient and family/care givers will demonstrate understanding of plan of care, disease process/condition, diagnostic tests and medications  Outcome: Progressing     Problem: Pain - Standard  Goal: Alleviation of pain or a reduction in pain to the patient’s comfort goal  Outcome: Progressing     Problem: Hemodynamics  Goal: Patient's hemodynamics, fluid balance and neurologic status will be stable or improve  Outcome: Progressing     Problem: Skin Integrity  Goal: Skin integrity is maintained or improved  Outcome: Progressing     Problem: Fall Risk  Goal: Patient will remain free from falls  Outcome: Progressing       Patient is not progressing towards the following goals:      Problem: Urinary - Renal Perfusion  Goal: Ability to achieve and maintain adequate renal perfusion and functioning will improve  Outcome: Not Progressing    for shift on lasix gtt, clear yellow

## 2024-06-21 NOTE — PROGRESS NOTES
Dr. Palafox and Dr. Delgado were updated regarding pt's UOP. Pt has had only 150 ml output today and is on a lasix drip running at 10 ml/hr

## 2024-06-21 NOTE — PROGRESS NOTES
Hospital Medicine Daily Progress Note    Date of Service  6/21/2024    Chief Complaint  Ryley Gaines is a 53 y.o. male admitted 6/18/2024 with acute kidney injury    Hospital Course    53 y.o. male with past medical history of cirrhosis possibly due to [place] hemochromatosis versus alcohol who presented to the hospital on 6/18/2024 with as a transfer from [place] [facility] with acute kidney injury, leukocytosis and hyponatremia.  Patient was recently admitted to [facility] where he was found to have pancreatitis and gallstone.  Patient remained ERCP with sphincterectomy and no obstruction was identified.  Patient has an upcoming appointment with the [facility] on July 12, 2024.  Patient found to have hyponatremia, acute kidney injury and hyperkalemia.  He also underwent paracentesis that showed elevated white blood cell count and he was placed on ceftriaxone, Flagyl and he was also placed on norepinephrine due to hypotension.  He was also started on Lasix drip and nephrology was consulted.     On June 28, 2024 intensivist Dr. Weaver [facility]    Interval Problem Update    06/21/24    I evaluated and examined him at the bedside.  He reported that he is feeling nauseous.  I discussed plan of care with nephrologist at the bedside.  I discussed plan of care with patient regarding use of Levophed and ongoing use of IV albumin.  I am continuing IV Lasix.  Continue to monitor input and output.  Continue to keep Vazquez's catheter due to strict input and output monitoring.  I discussed plan of care with bedside RN.    I have discussed this patient's plan of care and discharge plan at IDT rounds today with Case Management, Nursing, Nursing leadership, and other members of the IDT team.    Consultants/Specialty  critical care and nephrology    Code Status  Full Code    Disposition  <MEDICALLYCLEARED>  I have placed the appropriate orders for post-discharge  needs.    Review of Systems  Review of Systems   Constitutional:  Positive for malaise/fatigue. Negative for chills, fever and weight loss.   HENT:  Negative for hearing loss and tinnitus.    Eyes:  Negative for blurred vision, double vision, photophobia and pain.   Respiratory:  Negative for cough, sputum production and shortness of breath.    Cardiovascular:  Negative for chest pain, palpitations, orthopnea and leg swelling.   Gastrointestinal:  Positive for nausea. Negative for abdominal pain, constipation, diarrhea and vomiting.   Genitourinary:  Negative for dysuria, frequency and urgency.   Musculoskeletal:  Negative for back pain, joint pain, myalgias and neck pain.   Skin:  Negative for rash.   Neurological:  Negative for dizziness, tingling, tremors, sensory change, speech change, focal weakness and headaches.   Psychiatric/Behavioral:  Negative for hallucinations and substance abuse.    All other systems reviewed and are negative.       Physical Exam  Temp:  [36.3 °C (97.3 °F)-36.4 °C (97.6 °F)] 36.3 °C (97.4 °F)  Pulse:  [] 76  Resp:  [6-62] 18  BP: ()/(43-82) 101/51  SpO2:  [89 %-98 %] 96 %    Physical Exam  Vitals reviewed.   Constitutional:       General: He is not in acute distress.  HENT:      Head: Normocephalic and atraumatic. No contusion.      Right Ear: External ear normal.      Left Ear: External ear normal.      Nose: Nose normal.      Mouth/Throat:      Pharynx: No oropharyngeal exudate.   Eyes:      General:         Right eye: No discharge.         Left eye: No discharge.      Pupils: Pupils are equal, round, and reactive to light.   Cardiovascular:      Rate and Rhythm: Normal rate and regular rhythm.      Heart sounds: No murmur heard.     No friction rub. No gallop.      Comments: Central line  Pulmonary:      Effort: Pulmonary effort is normal.      Breath sounds: No wheezing or rhonchi.   Abdominal:      General: Bowel sounds are normal. There is no distension.       Palpations: Abdomen is soft.      Tenderness: There is no abdominal tenderness. There is no rebound.   Musculoskeletal:         General: No swelling or tenderness. Normal range of motion.      Cervical back: No rigidity. No muscular tenderness.      Right lower leg: Edema (Improving) present.      Left lower leg: Edema (Improving) present.   Skin:     General: Skin is warm and dry.      Coloration: Skin is not jaundiced.   Neurological:      General: No focal deficit present.      Mental Status: He is alert and oriented to person, place, and time.      Cranial Nerves: No cranial nerve deficit.      Sensory: No sensory deficit.      Comments: He is following commands and moving all his extremities   Psychiatric:         Mood and Affect: Mood normal.         Fluids    Intake/Output Summary (Last 24 hours) at 6/21/2024 0817  Last data filed at 6/21/2024 0633  Gross per 24 hour   Intake 1810.52 ml   Output 725 ml   Net 1085.52 ml       Laboratory  Recent Labs     06/19/24  0600 06/20/24  0520 06/21/24  0055   WBC 14.7* 11.6* 11.7*   RBC 3.14* 2.93* 2.89*   HEMOGLOBIN 10.3* 9.7* 9.5*   HEMATOCRIT 28.9* 26.4* 26.9*   MCV 92.0 90.1 93.1   MCH 32.8 33.1* 32.9   MCHC 35.6 36.7* 35.3   RDW 51.9* 50.1* 53.3*   PLATELETCT 169 127* 103*   MPV 10.6 10.4 10.6     Recent Labs     06/20/24  1818 06/21/24  0055 06/21/24  0615   SODIUM 122* 124* 124*   POTASSIUM 5.1 4.9 5.0   CHLORIDE 86* 86* 86*   CO2 17* 17* 17*   GLUCOSE 96 103* 105*   BUN 92* 95* 98*   CREATININE 7.92* 7.43* 7.00*   CALCIUM 7.9* 7.8* 7.8*                   Imaging  US-LIVER AND VESSELS LTD (TIPS) (COMBO)   Final Result      1. Patent TIPS shunt.   2. Cirrhotic morphology of the liver and ascites.   3. Sludge and stones in the gallbladder lumen. Gallbladder wall thickening is secondary to adjacent ascites.      OUTSIDE IMAGES-CT ABDOMEN /PELVIS   Final Result      EC-ECHOCARDIOGRAM LTD W/O CONT    (Results Pending)        Assessment/Plan  * Acute renal failure  (ARF) (HCC)- (present on admission)  Assessment & Plan  Normal creatinine when discharged on 6/8/2024, up to 9.7 on admit but improving now 7.6  Bicarb gtt. now discontinued.  Nephrology has been following him  ? Type I hepatorenal vs ATN from sepsis  Norepinephrine and he has been receiving albumin.  He remains on Levophed.  I am continuing entering Levophed as per target MAP of greater than 65 mmHg or systolic blood pressure of greater than 90 mmHg.  I personally discussed case with nephrologist at the bedside.  Now increased the dose of Lasix gtt. to 10 mg/h.  Continue to monitor input and output.      Septic shock (HCC)- (present on admission)  Assessment & Plan  This is Septic shock Present on admission  SIRS criteria identified on my evaluation include: Tachycardia, with heart rate greater than 90 BPM, Tachypnea, with respirations greater than 20 per minute, and Leukocytosis, with WBC greater than 12,000  Clinical indicators of end organ dysfunction include Hypotension with systolic blood pressure less than 90 or MAP less than 65 and Lactic Acid greater than 2  Indicators of septic shock include: Sepsis present and persistent hypotension despite fluid resuscitation   Sources is: Possibly bacterial peritonitis  Sepsis protocol initiated  Crystalloid Fluid Administration: Fluid resuscitation ordered per standard protocol - 30 mL/kg per current or ideal body weight.  Given at outside hospital  IV antibiotics as appropriate for source of sepsis  Reassessment: I have reassessed the patient's hemodynamic status  Pancultures ordered, follow-up culture from [facility]    6/18: wbc 20, levophed 0.22 mcgs  6/19: wbc 14.7, levophed and vaopressin    6/20 Now is requiring Levophed to maintain blood pressure.  Continue ceftriaxone.  I discussed plan of care with intensivist no plans to transfer him to [facility].  Follow-up on culture results.    6/21 I am continuing IV ceftriaxone, IV albumin and he still requiring Levophed  to maintain blood pressure.    Hyponatremia- (present on admission)  Assessment & Plan  Acute on chronic, hypervolemic  Admitted with 117  Due to cirrhosis  Current sodium level is 124  Continue monitor sodium level closely  Nephrology has been following him.    Cirrhosis (HCC)- (present on admission)  Assessment & Plan  History of cirrhosis, MELD on admit 33  Etiology: Hereditary hemochromatosis versus EtOH  Status post TIPS  I am continuing norepinephrine  Ultrasound showed TIPS shunt patent  Panculture negative including para * 2 on ceftriaxone (SBP by wbc criteria)  Continue to monitor closely  Continue IV albumin.  Continue monitor closely in IMCU.    Hereditary hemochromatosis (HCC)- (present on admission)  Assessment & Plan  Possible history of  Iron 62  Has required phlebotomy in the past however that was 6 to 7 years ago  Continue monitor CBC to evaluate for hemoglobin current hemoglobin is 9.5.    Pancreatitis- (present on admission)  Assessment & Plan  History of possible gallstone pancreatitis  Pancreatitis  with levels 697 on admit  He denies complaint of severe abdominal pain.  Now resume diet.        I discussed plan of care during multidisciplinary rounds regarding patient's current medical condition and plan of care.    I personally discussed case with nephrologist regarding Mr. Gaines current medical condition and plan of care.     Patient is critically ill.   The patient continues to have: Hypotension  The vital organ system that is affected is the: Cardiovascular system  If untreated there is a high chance of deterioration into: Cardiovascular collapse  And eventually death.   The critical care that I am providing today is: I am continuing and titrating Levophed as per target map of greater than 65 mmHg or systolic blood pressure of greater than 90 mmHg.  At the time of evaluation patient is requiring Levophed.  He is also receiving albumin.  The critical that has been undertaken is medically  complex.   There has been no overlap in critical care time.   Critical Care Time not including procedures: 39 minutes      VTE prophylaxis:    heparin ppx      I have performed a physical exam and reviewed and updated ROS and Plan today (6/21/2024). In review of yesterday's note (6/20/2024), there are no changes except as documented above.

## 2024-06-21 NOTE — PROGRESS NOTES
4 Eyes Skin Assessment Completed by SAMMY Dumont and SAMMY Hernandez.    Head WDL  Ears Redness and Blanching  Nose WDL  Mouth WDL  Neck WDL  Breast/Chest WDL  Shoulder Blades WDL  Spine WDL  (R) Arm/Elbow/Hand WDL  (L) Arm/Elbow/Hand Redness, Discoloration, and Rash  Abdomen Bruising left para site leaking, swelling,edema, weeping, dressing was changed with abdominal pads  Groin WDL  Scrotum/Coccyx/Buttocks Redness and Blanching bruise (wound care came and assessed)  (R) Leg WDL  (L) Leg Bruising  (R) Heel/Foot/Toe Redness, Boggy, Bruising, Swelling, and Edema  (L) Heel/Foot/Toe Redness, Blanching, Boggy, Bruising, and Edema                        Devices In Places ECG, Blood Pressure Cuff, Pulse Ox, Vazquez, SCD's, Central Line, and Nasal Cannula      Interventions In Place NC W/Ear Foams, Sacral Mepilex, TAP System, Pillows, Q2 Turns, Low Air Loss Mattress, and Heels Loaded W/Pillows    Possible Skin Injury Yes    Pictures Uploaded Into Epic Yes  Wound Consult Placed Yes  RN Wound Prevention Protocol Ordered Yes

## 2024-06-21 NOTE — PROGRESS NOTES
[facility] -  PROGRESS NOTE               Author: Winifred Zamora M.D. Date & Time: 6/21/2024  8:47 AM     HPI:  53 y.o. male with h/o HTN, hereditary hemochromatosis, cirrhosis complicated by ascites requiring TIPS, who presented to outside hospital with c/o generalized weakness, nausea, vomiting, decreased p.o. intake as well as  decreased urine output for the past several days.  He was found to be hypotensive 60 mmHg SBP on arrival .   Initial labs noted for Na 119, K 5.4 bicarb 13 and elevated Cr. Pt had diagnostic paracentetics with evidence of SBP.   Pt treated with 2 L of crystalloids, albumin 50 g and IV ceftriaxone and Flagyl.  He was transferred to [facility] for further evaluation and continuation of care.  Lab work upon arrival to [facility] showed WBC 21, S Na 117 ( 6/18 2.30 ) improved to 119 on AM labs, K 5.4, CO2 12, BUN 85, Cr 8.8, total bilirubin 2.9, lipase 697, urine sodium less than 20, UA moderate bilirubin positive nitrite, 3-5 WBCs 0-2 RBC and few bacteria  Blood pressure has since improved 90s to 100 systolic, he is on room air.  No urine output documented so far.  Patient is currently receiving albumin 25 g every 6 hours, and on norepinephrine .  Of note he was recently hospitalized for  gallstone pancreatitis status post ERCP and sphincterotomy, hospital course complicated by BACILIO in the setting of IV contrast and pancreatitis. He was seen by nephrology at th time.  Cr has  increased to 2.1 from 0.8 2/1.Cr has improved to baseline 0.8 6/8.   Pt has hx of chronic hyponatremia with serum sodium 130-134 during last admission   S Na . Pt was discharged on Lasix, spirolactone, lisinopril. Other pertinent home meds gabapentin and Trazodone.   Pt is alert and oriented, but  slow to answer questions. He has been taking motrin daily for abdominal pain following recent hospital admission. Pt says he has not used any ETOH at least for the past year or so.   No  "F/C/CP/SOB.  No melena, hematochezia, hematemesis.  No HA, visual changes.    DAILY NEPHROLOGY SUMMARY:  6/18: consult done   6/19: No acute events, more alert today, answering questions appropriately,  cc/ 24 hrs, received lasix 100 mg earlier today, seem to be responding to IV lasix,  cc s/p lasix. Increase abdominal distention, wife at bed side, on vasopressor and NE, on room air, plan for paracentesis today   6/20: no acute events, UOP 3375 cc/24 hrs, on clears,on low dose NE,s/p paracentesis yesterday 6 L removed, wife at bed side, S Na improved to 123 appropriate rise, Cr improved to 7.62, per RN UOP slowing down 60 cc/ hr since am  appetite still poor.   6/21: Pt says he his feeling nauseous, still poor appetite, urine out put dropped 875 cc/24 hrs, on lasix gtt 5 mg/ hr, on pressors NE    REVIEW OF SYSTEMS:    10 point ROS reviewed and is as per HPI/daily summary or otherwise negative    PMH/PSH/SH/FH:  Reviewed and unchanged since admission note    CURRENT MEDICATIONS:  Reviewed from admission to present day    VS:  /51   Pulse 76   Temp 36.3 °C (97.4 °F) (Temporal)   Resp 18   Ht 1.905 m (6' 3\")   Wt (!) 138 kg (303 lb 12.7 oz)   SpO2 96%   BMI 37.97 kg/m²   Physical Exam  Constitutional:       General: He is not in acute distress.     Appearance: He is not ill-appearing.   HENT:      Head: Normocephalic.      Mouth/Throat:      Mouth: Mucous membranes are moist.   Eyes:      General: Scleral icterus present.   Cardiovascular:      Rate and Rhythm: Normal rate.   Pulmonary:      Effort: Pulmonary effort is normal.   Abdominal:      General: There is distension.   Musculoskeletal:         General: Swelling present.   Skin:     General: Skin is warm.   Neurological:      Mental Status: He is oriented to person, place, and time.   Psychiatric:         Mood and Affect: Mood normal.         Fluids:    Intake/Output Summary (Last 24 hours) at 6/21/2024 0862  Last data filed at " 6/21/2024 0633  Gross per 24 hour   Intake 1810.52 ml   Output 725 ml   Net 1085.52 ml       LABS:  Labs reviewed, pertinent labs below.    Recent Labs     06/19/24  0600 06/20/24  0520 06/21/24  0055   WBC 14.7* 11.6* 11.7*   RBC 3.14* 2.93* 2.89*   HEMOGLOBIN 10.3* 9.7* 9.5*   HEMATOCRIT 28.9* 26.4* 26.9*   MCV 92.0 90.1 93.1   MCH 32.8 33.1* 32.9   MCHC 35.6 36.7* 35.3   RDW 51.9* 50.1* 53.3*   PLATELETCT 169 127* 103*   MPV 10.6 10.4 10.6       Recent Labs     06/20/24  1818 06/21/24  0055 06/21/24  0615   SODIUM 122* 124* 124*   POTASSIUM 5.1 4.9 5.0   CHLORIDE 86* 86* 86*   CO2 17* 17* 17*   GLUCOSE 96 103* 105*   BUN 92* 95* 98*   CREATININE 7.92* 7.43* 7.00*   CALCIUM 7.9* 7.8* 7.8*        IMAGING:  All imaging reviewed from admission to present day      IMPRESSION:  # BACILIO oliguric   - Suspect sec to  ischemic ATN in the setting of unstable hemodynamics, decrease EABV, septic shock, SBP, NSAIDs, and bile cast nephropathy   - Low suspicion for HRS based on clinical presentation   - No evidence of obstruction on CT from outside hospital   - Recent Hx of BACILIO, Cr improved to 0.8 6/8   # Cirrhosis, MELD 33   - Possible etiologies hereditary hemochromatosis versus history of alcoholism   # Ascites   # Hyponatremia   - Suspect sec to cirrhosis, decreased EABV   - Also gabapentin and trazodone can alter ADH secretion and may have contributed   # Septic shock likely sec to SBP   # Metabolic acidosis   # Pancreatitis   # SBP   # Hypervolemia, total body volume overload with decrease EABV  # Thrombocytopenia   # Low serum albumin   # Hyperphosphatemia         SUGGESTIONS:  - No urgent indication for RRT  - Daily evaluation for RRT needs, pt agreeable to dialysis if needed ever.   - Continue vasopressors to maintain MAP > 65 mmHg  - Raise serum Na in small increments 4-6 mEq/L in 24 hrs as pt is at risk for ODS  - Maintain serum Na at 130 mEq/L until 6/22 6 AM   - Increase Lasix gtt 10 mg/ hr   - Monitor serum Na q 6-8   hrs   - Ct PO bicarb 1300 mg bid   -  IV albumin 25 g tid  - Ab per primary svc   - Avoid nephrotoxins   - Adjust meds for renal function   - Holding  gabapentin and trazodone  - Limit fluid intake 1200 cc/d   - Ca acetate 667 mg tid ac      Discussed  the above with hospitalist    2 PM RN notified his UOP has been only 150 cc since 8 am   Lasix gtt titrated 15 cc / hr

## 2024-06-22 NOTE — PROGRESS NOTES
Pt transported off unit with 2x IR RN for dialysis cath placement. Pt on monitor, report given to Benton CHRISTIANSON and Nita RN. Time made available for questions. VSS

## 2024-06-22 NOTE — PROGRESS NOTES
[facility] -  PROGRESS NOTE               Author: Winifred Zamora M.D. Date & Time: 6/22/2024  7:45 AM     HPI:  53 y.o. male with h/o HTN, hereditary hemochromatosis, cirrhosis complicated by ascites requiring TIPS, who presented to outside hospital with c/o generalized weakness, nausea, vomiting, decreased p.o. intake as well as  decreased urine output for the past several days.  He was found to be hypotensive 60 mmHg SBP on arrival .   Initial labs noted for Na 119, K 5.4 bicarb 13 and elevated Cr. Pt had diagnostic paracentetics with evidence of SBP.   Pt treated with 2 L of crystalloids, albumin 50 g and IV ceftriaxone and Flagyl.  He was transferred to [facility] for further evaluation and continuation of care.  Lab work upon arrival to [facility] showed WBC 21, S Na 117 ( 6/18 2.30 ) improved to 119 on AM labs, K 5.4, CO2 12, BUN 85, Cr 8.8, total bilirubin 2.9, lipase 697, urine sodium less than 20, UA moderate bilirubin positive nitrite, 3-5 WBCs 0-2 RBC and few bacteria  Blood pressure has since improved 90s to 100 systolic, he is on room air.  No urine output documented so far.  Patient is currently receiving albumin 25 g every 6 hours, and on norepinephrine .  Of note he was recently hospitalized for  gallstone pancreatitis status post ERCP and sphincterotomy, hospital course complicated by BACILIO in the setting of IV contrast and pancreatitis. He was seen by nephrology at th time.  Cr has  increased to 2.1 from 0.8 2/1.Cr has improved to baseline 0.8 6/8.   Pt has hx of chronic hyponatremia with serum sodium 130-134 during last admission   S Na . Pt was discharged on Lasix, spirolactone, lisinopril. Other pertinent home meds gabapentin and Trazodone.   Pt is alert and oriented, but  slow to answer questions. He has been taking motrin daily for abdominal pain following recent hospital admission. Pt says he has not used any ETOH at least for the past year or so.   No  "F/C/CP/SOB.  No melena, hematochezia, hematemesis.  No HA, visual changes.    DAILY NEPHROLOGY SUMMARY:  6/18: consult done   6/19: No acute events, more alert today, answering questions appropriately,  cc/ 24 hrs, received lasix 100 mg earlier today, seem to be responding to IV lasix,  cc s/p lasix. Increase abdominal distention, wife at bed side, on vasopressor and NE, on room air, plan for paracentesis today   6/20: no acute events, UOP 3375 cc/24 hrs, on clears,on low dose NE,s/p paracentesis yesterday 6 L removed, wife at bed side, S Na improved to 123 appropriate rise, Cr improved to 7.62, per RN UOP slowing down 60 cc/ hr since am  appetite still poor.   6/21: Pt says he his feeling nauseous, still poor appetite, urine out put dropped 875 cc/24 hrs, on lasix gtt 5 mg/ hr, on pressors NE  6/22: UOP has dropped 215 cc over the past 24 hrs, pt reports abdominal discomfort and distention, low BP  systolic    REVIEW OF SYSTEMS:    10 point ROS reviewed and is as per HPI/daily summary or otherwise negative    PMH/PSH/SH/FH:  Reviewed and unchanged since admission note    CURRENT MEDICATIONS:  Reviewed from admission to present day    VS:  BP (!) 78/50   Pulse 93   Temp 37.3 °C (99.2 °F) (Temporal)   Resp 14   Ht 1.905 m (6' 3\")   Wt (!) 138 kg (303 lb 12.7 oz)   SpO2 90%   BMI 37.97 kg/m²   Physical Exam  Constitutional:       General: He is not in acute distress.     Appearance: He is not ill-appearing.   HENT:      Head: Normocephalic.      Mouth/Throat:      Mouth: Mucous membranes are moist.   Eyes:      General: Scleral icterus present.   Cardiovascular:      Rate and Rhythm: Normal rate.   Pulmonary:      Effort: Pulmonary effort is normal.   Abdominal:      General: There is distension.   Musculoskeletal:         General: Swelling present.   Skin:     General: Skin is warm.   Neurological:      Mental Status: He is oriented to person, place, and time.   Psychiatric:         Mood " and Affect: Mood normal.         Fluids:    Intake/Output Summary (Last 24 hours) at 6/22/2024 0745  Last data filed at 6/22/2024 0700  Gross per 24 hour   Intake 273.86 ml   Output 218 ml   Net 55.86 ml       LABS:  Labs reviewed, pertinent labs below.    Recent Labs     06/20/24  0520 06/21/24  0055 06/22/24  0002   WBC 11.6* 11.7* 10.2   RBC 2.93* 2.89* 2.67*   HEMOGLOBIN 9.7* 9.5* 8.7*   HEMATOCRIT 26.4* 26.9* 24.6*   MCV 90.1 93.1 92.1   MCH 33.1* 32.9 32.6   MCHC 36.7* 35.3 35.4   RDW 50.1* 53.3* 52.1*   PLATELETCT 127* 103* 87*   MPV 10.4 10.6 11.4       Recent Labs     06/21/24  0615 06/21/24  1145 06/22/24  0002   SODIUM 124* 125* 123*  123*   POTASSIUM 5.0 4.7 4.4  4.4   CHLORIDE 86* 87* 85*  85*   CO2 17* 18* 17*  17*   GLUCOSE 105* 99 112*  112*   BUN 98* 100* 97*  97*   CREATININE 7.00* 7.29* 7.33*  7.33*   CALCIUM 7.8* 7.9* 8.0*  8.0*        IMAGING:  All imaging reviewed from admission to present day      IMPRESSION:  # BACILIO oliguric   - Suspect sec to  ischemic ATN in the setting of unstable hemodynamics, decrease EABV, septic shock, SBP, NSAIDs, and bile cast nephropathy   - On initial presentation there was low suspicion for HRS given sepsis picture, some improvement  in renal function early in the course but worsened again , suspect HRS, abdominal compartment syndrome from tense ascites   - No evidence of obstruction on CT from outside hospital   - Recent Hx of BACILIO, Cr improved to 0.8 6/8   # Cirrhosis, MELD 33   - Possible etiologies hereditary hemochromatosis versus history of alcoholism   # Ascites   # Hyponatremia   - Suspect sec to cirrhosis, decreased EABV altering ADH secretion   - Also gabapentin and trazodone can alter ADH secretion and may have contributed , on hold   # Septic shock likely sec to SBP, leukocytosis resolved    # Metabolic acidosis   # SBP   # Hypervolemia, total body volume overload with decrease EABV  # Thrombocytopenia   # Low serum albumin   # Hyperphosphatemia,  hypocalcemia         SUGGESTIONS:  - Renal function has worsened over the past 2 days, need dialysis for solute clearance and volume removal, discussed risk/benefits of dialysis, pt is agreeable to dialysis today   - HD today 2 hrs after Dialysis cath placement   - Will need paracentesis to relive abdominal pressure in an attempt to  improve renal perfusion   - Daily evaluation for RRT needs  - Continue vasopressors  PRN to maintain MAP > 65 mmHg  - Midodrine 10 mg tid   - Raise serum Na in small increments 4-6 mEq/L in 24 hrs as pt  high risk for ODS  - Maintain serum Na at 127 mEq/L until 6/23 6 AM   - DC lasix   - Monitor serum Na q 6-8  hrs   - DC PO bicarb   -  IV albumin for SBP  - Ab per primary svc   - Avoid nephrotoxins   - Adjust meds for renal function   - Holding  gabapentin and trazodone  - Limit fluid intake 1200 cc/d   - Increase Ca acetate 1334  mg tid ac      Discussed  the above with hospitalist and RN

## 2024-06-22 NOTE — PROGRESS NOTES
Pt transported back to T602 on monitor with IR RN. VSS, pt resting in bed. Orders for strict bed rest x1 hour. Site CDI. Pt updated to POC

## 2024-06-22 NOTE — PROGRESS NOTES
Hospital Medicine Daily Progress Note    Date of Service  6/22/2024    Chief Complaint  Ryley Gaines is a 53 y.o. male admitted 6/18/2024 with acute kidney injury    Hospital Course    53 y.o. male with past medical history of cirrhosis possibly due to [place] hemochromatosis versus alcohol who presented to the hospital on 6/18/2024 with as a transfer from [facility] with acute kidney injury, leukocytosis and hyponatremia.  Patient was recently admitted to [facility] where he was found to have pancreatitis and gallstone.  Patient remained ERCP with sphincterectomy and no obstruction was identified.  Patient has an upcoming appointment with the [facility] on July 12, 2024.  Patient found to have hyponatremia, acute kidney injury and hyperkalemia.  He also underwent paracentesis that showed elevated white blood cell count and he was placed on ceftriaxone, Flagyl and he was also placed on norepinephrine due to hypotension.  He was also started on Lasix drip and nephrology was consulted.     On June 28, 2024 intensivist Dr. [facility]    Interval Problem Update    06/21/24    I evaluated and examined him at the bedside.  He reported that he is feeling nauseous.  I discussed plan of care with nephrologist at the bedside.  I discussed plan of care with patient regarding use of Levophed and ongoing use of IV albumin.  I am continuing IV Lasix.  Continue to monitor input and output.  Continue to keep Vazquez's catheter due to strict input and output monitoring.  I discussed plan of care with bedside RN.    06/22/24    I evaluated and examined him at the bedside.  He reported that he has been having abdominal pain.  I ordered ultrasound guided paracentesis.  I discussed plan of care with nephrologist and he is going to have tunneled dialysis catheter placement.  He is still requiring Levophed.  I will continue titrating Levophed as per target MAP of  greater than 65 mmHg or systolic blood pressure of greater than 90 mmHg.  I discussed plan of care with him and answered all his questions.  Now he had a bowel movement.  Continue lactulose.    I have discussed this patient's plan of care and discharge plan at IDT rounds today with Case Management, Nursing, Nursing leadership, and other members of the IDT team.    Consultants/Specialty  critical care and nephrology    Code Status  Full Code    Disposition  The patient is not medically cleared for discharge to home or a post-acute facility.      I have placed the appropriate orders for post-discharge needs.    Review of Systems  Review of Systems   Constitutional:  Positive for malaise/fatigue. Negative for chills, fever and weight loss.   HENT:  Negative for hearing loss and tinnitus.    Eyes:  Negative for blurred vision, double vision, photophobia and pain.   Respiratory:  Negative for cough, sputum production and shortness of breath.    Cardiovascular:  Negative for chest pain, palpitations, orthopnea and leg swelling.   Gastrointestinal:  Positive for nausea. Negative for abdominal pain, constipation, diarrhea and vomiting.   Genitourinary:  Negative for dysuria, frequency and urgency.   Musculoskeletal:  Negative for back pain, joint pain, myalgias and neck pain.   Skin:  Negative for rash.   Neurological:  Negative for dizziness, tingling, tremors, sensory change, speech change, focal weakness and headaches.   Psychiatric/Behavioral:  Negative for hallucinations and substance abuse.    All other systems reviewed and are negative.       Physical Exam  Temp:  [36.7 °C (98 °F)-37.3 °C (99.2 °F)] 37.3 °C (99.2 °F)  Pulse:  [] 78  Resp:  [10-51] 21  BP: ()/(37-72) 87/44  SpO2:  [88 %-97 %] 97 %    Physical Exam  Vitals reviewed.   Constitutional:       General: He is not in acute distress.  HENT:      Head: Normocephalic and atraumatic. No contusion.      Right Ear: External ear normal.      Left Ear:  External ear normal.      Nose: Nose normal.      Mouth/Throat:      Pharynx: No oropharyngeal exudate.   Eyes:      General:         Right eye: No discharge.         Left eye: No discharge.      Pupils: Pupils are equal, round, and reactive to light.   Cardiovascular:      Rate and Rhythm: Normal rate and regular rhythm.      Heart sounds: No murmur heard.     No friction rub. No gallop.      Comments: Central line  Pulmonary:      Effort: Pulmonary effort is normal.      Breath sounds: No wheezing or rhonchi.   Abdominal:      General: Bowel sounds are normal. There is no distension.      Palpations: Abdomen is soft.      Tenderness: There is abdominal tenderness. There is no rebound.      Comments: Generalized mild pain  No signs of acute abdomen on physical exam.     Musculoskeletal:         General: No swelling or tenderness. Normal range of motion.      Cervical back: No rigidity. No muscular tenderness.      Right lower leg: Edema (Improving) present.      Left lower leg: Edema (Improving) present.   Skin:     General: Skin is warm and dry.      Coloration: Skin is not jaundiced.   Neurological:      General: No focal deficit present.      Mental Status: He is alert and oriented to person, place, and time.      Cranial Nerves: No cranial nerve deficit.      Sensory: No sensory deficit.      Comments: He is following commands and moving all his extremities   Psychiatric:         Mood and Affect: Mood normal.         Fluids    Intake/Output Summary (Last 24 hours) at 6/22/2024 1409  Last data filed at 6/22/2024 1400  Gross per 24 hour   Intake 808.74 ml   Output 1200 ml   Net -391.26 ml       Laboratory  Recent Labs     06/20/24  0520 06/21/24  0055 06/22/24  0002   WBC 11.6* 11.7* 10.2   RBC 2.93* 2.89* 2.67*   HEMOGLOBIN 9.7* 9.5* 8.7*   HEMATOCRIT 26.4* 26.9* 24.6*   MCV 90.1 93.1 92.1   MCH 33.1* 32.9 32.6   MCHC 36.7* 35.3 35.4   RDW 50.1* 53.3* 52.1*   PLATELETCT 127* 103* 87*   MPV 10.4 10.6 11.4      Recent Labs     06/21/24  1145 06/22/24  0002 06/22/24  0818   SODIUM 125* 123*  123* 121*   POTASSIUM 4.7 4.4  4.4 4.4   CHLORIDE 87* 85*  85* 83*   CO2 18* 17*  17* 16*   GLUCOSE 99 112*  112* 100*   * 97*  97* 101*   CREATININE 7.29* 7.33*  7.33* 7.64*   CALCIUM 7.9* 8.0*  8.0* 8.1*     Recent Labs     06/22/24  0818   INR 2.53*               Imaging  US-LIVER AND VESSELS LTD (TIPS) (COMBO)   Final Result      1. Patent TIPS shunt.   2. Cirrhotic morphology of the liver and ascites.   3. Sludge and stones in the gallbladder lumen. Gallbladder wall thickening is secondary to adjacent ascites.      OUTSIDE IMAGES-CT ABDOMEN /PELVIS   Final Result      EC-ECHOCARDIOGRAM LTD W/O CONT    (Results Pending)   IR-LIN,GROSHONG PLACEMENT >5    (Results Pending)        Assessment/Plan  * Acute renal failure (ARF) (HCC)- (present on admission)  Assessment & Plan  Normal creatinine when discharged on 6/8/2024, up to 9.7 on admit but improving now 7.6  Bicarb gtt. now discontinued.  Nephrology has been following him  ? Type I hepatorenal vs ATN from sepsis  Norepinephrine and he has been receiving albumin.  He remains on Levophed.  I am continuing entering Levophed as per target MAP of greater than 65 mmHg or systolic blood pressure of greater than 90 mmHg.  I personally discussed case with nephrologist now plan is to initiate dialysis today.  He underwent tunneled dialysis catheter placement on June 22, 2024.      Septic shock (HCC)- (present on admission)  Assessment & Plan  This is Septic shock Present on admission  SIRS criteria identified on my evaluation include: Tachycardia, with heart rate greater than 90 BPM, Tachypnea, with respirations greater than 20 per minute, and Leukocytosis, with WBC greater than 12,000  Clinical indicators of end organ dysfunction include Hypotension with systolic blood pressure less than 90 or MAP less than 65 and Lactic Acid greater than 2  Indicators of septic shock  include: Sepsis present and persistent hypotension despite fluid resuscitation   Sources is: Possibly bacterial peritonitis  Sepsis protocol initiated  Crystalloid Fluid Administration: Fluid resuscitation ordered per standard protocol - 30 mL/kg per current or ideal body weight.  Given at outside hospital  IV antibiotics as appropriate for source of sepsis  Reassessment: I have reassessed the patient's hemodynamic status  Pancultures ordered, follow-up culture from [facility]    6/18: wbc 20, levophed 0.22 mcgs  6/19: wbc 14.7, levophed and vaopressin    6/20 Now is requiring Levophed to maintain blood pressure.  Continue ceftriaxone.  I discussed plan of care with intensivist no plans to transfer him to [facility] [place].  Follow-up on culture results.    6/21 I am continuing IV ceftriaxone, IV albumin and he still requiring Levophed to maintain blood pressure..    06/22/24 ongoing hypertension.  I am continuing Levophed as per target MAP of greater than 65 mmHg systolic blood pressure of greater than 90 mmHg.    Hyponatremia- (present on admission)  Assessment & Plan  Acute on chronic, hypervolemic  Admitted with 117  Due to cirrhosis  Current sodium level is 121  Continue monitor sodium level closely  I discussed plan of care with nephrologist.    Cirrhosis (HCC)- (present on admission)  Assessment & Plan  History of cirrhosis, MELD on admit 33  Etiology: Hereditary hemochromatosis versus EtOH  Status post TIPS  I am continuing norepinephrine  Ultrasound showed TIPS shunt patent  Panculture negative including para * 2 on ceftriaxone (SBP by wbc criteria)  Continue to monitor closely  On physical exam he found to have abdominal distention I ordered ultrasound-guided paracentesis.  I placed a hold on heparin.    Hereditary hemochromatosis (HCC)- (present on admission)  Assessment & Plan  Possible history of  Iron 62  Has required phlebotomy in the past however that was 6 to 7 years ago  Continue monitor CBC to evaluate for  hemoglobin current hemoglobin is 8.7.    Pancreatitis- (present on admission)  Assessment & Plan  History of possible gallstone pancreatitis  Pancreatitis  with levels 697 on admit  He denies complaint of severe abdominal pain.  Now resume diet.        I discussed plan of care during multidisciplinary rounds regarding patient's current medical condition and plan of care.      I personally discussed case with nephrologist Dr. Zamora regarding Mr. Gaines plan for initiation of hemodialysis.     Patient is critically ill.   The patient continues to have: Hypotension  The vital organ system that is affected is the: Cardiovascular system  If untreated there is a high chance of deterioration into: Cardiovascular collapse  And eventually death.   The critical care that I am providing today is: I am continuing and titrating Levophed as per target map of greater than 65 mmHg or systolic blood pressure of greater than 90 mmHg.  At the time of evaluation patient is requiring Levophed.  The critical that has been undertaken is medically complex.   There has been no overlap in critical care time.   Critical Care Time not including procedures: 37 minutes        Holding heparin DVT prophylaxis for IR guided tunneled catheter placement and I also ordered ultrasound-guided paracentesis.  SCDs for now.  I placed hold on heparin.  Plan is to resume it after paracentesis.  VTE prophylaxis:    heparin ppx      I have performed a physical exam and reviewed and updated ROS and Plan today (6/22/2024). In review of yesterday's note (6/21/2024), there are no changes except as documented above.

## 2024-06-22 NOTE — PROGRESS NOTES
4 Eyes Skin Assessment Completed by SAMMY duvall and SAMMY haque.    Head WDL  Ears Redness and Blanching  Nose WDL  Mouth WDL  Neck WDL  Breast/Chest WDL  Shoulder Blades WDL  Spine WDL  (R) Arm/Elbow/Hand Edema  (L) Arm/Elbow/Hand Redness, Discoloration, Rash, and Edema  Abdomen Bruising, swelling, edema  Groin WDL  Scrotum/Coccyx/Buttocks Redness and Blanching  (R) Leg Edema  (L) Leg Edema  (R) Heel/Foot/Toe Redness, Boggy, Bruising, Swelling, and Edema  (L) Heel/Foot/Toe Redness, Blanching, Boggy, Bruising, and Edema          Devices In Places ECG, Blood Pressure Cuff, Pulse Ox, Vazquez, SCD's, Central Line, and Nasal Cannula      Interventions In Place Gray Ear Foams, Sacral Mepilex, TAP System, Pillows, Q2 Turns, Low Air Loss Mattress, Heels Loaded W/Pillows, and Pressure Redistribution Mattress    Possible Skin Injury Yes    Pictures Uploaded Into Epic Yes  Wound Consult Placed Yes  RN Wound Prevention Protocol Ordered Yes

## 2024-06-22 NOTE — PROGRESS NOTES
Nephrologist Dr. Rosenthal was updated regarding pt's low urine output 25 ml since 1400. Pt is on a lasix drip at 15 ml/hr    Plan is to keep pt NPO at midnight for possible dialysis tmo per Dr. Soriano

## 2024-06-22 NOTE — PROGRESS NOTES
4 Eyes Skin Assessment Completed by SAMMY Garza and SAMMY Glez.    Head WDL  Ears Redness and Blanching  Nose WDL  Mouth WDL  Neck WDL  Breast/Chest Incision, tunneled dialysis catheter and puncture site  Shoulder Blades WDL  Spine WDL  (R) Arm/Elbow/Hand Redness, Blanching, Bruising, and Edema  (L) Arm/Elbow/Hand Redness, Blanching, Bruising, Abrasion, and Edema, skin tear  Abdomen Bruising and Incision, weeping, edema, skin tear, puncture site      Groin Redness, Blanching, and Swelling  Scrotum/Coccyx/Buttocks Redness, Blanching, and Moisture Fissure, bruising    (R) Leg Redness, Blanching, Bruising, and Edema  (L) Leg Redness, Blanching, Bruising, and Edema  (R) Heel/Foot/Toe Redness, Blanching, and Edema    (L) Heel/Foot/Toe Redness, Blanching, and Edema            Devices In Places ECG, Blood Pressure Cuff, Pulse Ox, Vazquez, and Nasal Cannula      Interventions In Place Gray Ear Foams, Heel Mepilex, Sacral Mepilex, Pillows, Q2 Turns, and Pressure Redistribution Mattress    Possible Skin Injury Yes    Pictures Uploaded Into Epic Yes  Wound Consult Placed Yes  RN Wound Prevention Protocol Ordered Yes

## 2024-06-22 NOTE — PROGRESS NOTES
Pt presents to IR-2.   Pt was consented by MD at bedside, confirmed by this RN.   Pt transferred to IR table in supine position.   Pt placed on monitor, prepped and draped in a sterile fashion.  Patient underwent a R-IJ tunneled HD cath placement by Dr. Glaser.   Procedure site was marked by MD and verified using imaging guidance.    Vitals were taken every 5 minutes and remained stable during procedure (see doc flow sheet for results).   Levophed gtt titrated per parameters throughout case.  CO2 waveform capnography was monitored and remained WNL throughout procedure.     Report called to Kayla CHRISTIANSON. Pt transported in pt bed, monitored on 2LNC with RN to T602.     Right IJ tunneled HD catheter (14.5Fr, 23cm)  Bard Glidepath long-term hemodialysis catheter  REF: [id number]  LOT: [id number]  EXP: 2025-11-30

## 2024-06-22 NOTE — CARE PLAN
The patient is Watcher - Medium risk of patient condition declining or worsening    Shift Goals  Clinical Goals: hemodynamicaly stable, pain control  Patient Goals: pain control  Family Goals: updates    Progress made toward(s) clinical / shift goals:    Problem: Knowledge Deficit - Standard  Goal: Patient and family/care givers will demonstrate understanding of plan of care, disease process/condition, diagnostic tests and medications  Outcome: Progressing     Problem: Pain - Standard  Goal: Alleviation of pain or a reduction in pain to the patient’s comfort goal  Outcome: Progressing     Problem: Hemodynamics  Goal: Patient's hemodynamics, fluid balance and neurologic status will be stable or improve  Outcome: Progressing     Problem: Fluid Volume  Goal: Fluid volume balance will be maintained  Outcome: Progressing     Problem: Urinary - Renal Perfusion  Goal: Ability to achieve and maintain adequate renal perfusion and functioning will improve  Outcome: Progressing     Problem: Respiratory  Goal: Patient will achieve/maintain optimum respiratory ventilation and gas exchange  Outcome: Progressing     Problem: Skin Integrity  Goal: Skin integrity is maintained or improved  Outcome: Progressing     Problem: Fall Risk  Goal: Patient will remain free from falls  Outcome: Progressing       Patient is not progressing towards the following goals:

## 2024-06-22 NOTE — OR SURGEON
Immediate Post- Operative Note        Findings: ARF.      Procedure(s): Tunneled HD catheter placement.       Estimated Blood Loss: Less than 5 ml        Complications: None            6/22/2024     1032 AM     Hunter Glaser M.D.

## 2024-06-22 NOTE — PROGRESS NOTES
[facility] Nursing Notes     1st HD today x 2hrs per Dr Zamora  Consent signed for HD  Initiated at 1135 and ended 1335     Pre HD assessment     Received patient alert and oriented.   No Sob. No chest pain  Lungs Clear. Vital Signs stable  BP 98/60 to 112/55  No complains pre HD.     Edema - no edema     Access: - newly placed right chest PC, Non tunneled  Procedure done at IR    No s/s of infection: no redness, no tenderness, no pus, no oozing of blood, warm to touch.     Intra HD    BP on the low side during HD  91/53mmhg to 109/48mmhg  With ongoing 1 IV Levo 8mg in 250ml  PO Midodrine 10mg given pre HD  Pt is asymptomatic  Dr Zamora advised to target 1L if bp tolerates    @1300 - bp dropping 90/42, asypmtomatic  Decreased UF target to 600mls NET     Post HD     UF goal  achieved: 1100mLs  - 500 mLs (200mls prime + 300mls rinseback)       Target Net UF : 600mLs     Completed HD tolerated well  Post vital signs stable  Nil complaints  Dressing: dry and intact  Heparin lock A1.8mls and V1.8mls intracatheter post HD     Documented by  ISRAEL HAMPTON RN    Report given to PCDANIELLA Shaver

## 2024-06-22 NOTE — CARE PLAN
Problem: Knowledge Deficit - Standard  Goal: Patient and family/care givers will demonstrate understanding of plan of care, disease process/condition, diagnostic tests and medications  Outcome: Progressing     Problem: Pain - Standard  Goal: Alleviation of pain or a reduction in pain to the patient’s comfort goal  Outcome: Progressing     Problem: Hemodynamics  Goal: Patient's hemodynamics, fluid balance and neurologic status will be stable or improve  Outcome: Progressing     Problem: Fluid Volume  Goal: Fluid volume balance will be maintained  Outcome: Progressing     Problem: Urinary - Renal Perfusion  Goal: Ability to achieve and maintain adequate renal perfusion and functioning will improve  Outcome: Progressing     Problem: Respiratory  Goal: Patient will achieve/maintain optimum respiratory ventilation and gas exchange  Outcome: Progressing     Problem: Mechanical Ventilation  Goal: Safe management of artificial airway and ventilation  Outcome: Progressing  Goal: Successful weaning off mechanical ventilator, spontaneously maintains adequate gas exchange  Outcome: Progressing  Goal: Patient will be able to express needs and understand communication  Outcome: Progressing     Problem: Physical Regulation  Goal: Diagnostic test results will improve  Outcome: Progressing  Goal: Signs and symptoms of infection will decrease  Outcome: Progressing     Problem: Skin Integrity  Goal: Skin integrity is maintained or improved  Outcome: Progressing     Problem: Fall Risk  Goal: Patient will remain free from falls  Outcome: Progressing   The patient is Stable - Low risk of patient condition declining or worsening    Shift Goals  Clinical Goals: hemodynamicaly stable, pain control  Patient Goals: pain conrol  Family Goals: family updated in room    Progress made toward(s) clinical / shift goals:  yes

## 2024-06-23 NOTE — CARE PLAN
The patient is Watcher - Medium risk of patient condition declining or worsening    Shift Goals  Clinical Goals: SBP >90, Map>65  Patient Goals: pain managment  Family Goals: rest    Progress made toward(s) clinical / shift goals:    Problem: Knowledge Deficit - Standard  Goal: Patient and family/care givers will demonstrate understanding of plan of care, disease process/condition, diagnostic tests and medications  Outcome: Progressing     Problem: Pain - Standard  Goal: Alleviation of pain or a reduction in pain to the patient’s comfort goal  Outcome: Progressing     Problem: Respiratory  Goal: Patient will achieve/maintain optimum respiratory ventilation and gas exchange  Outcome: Progressing     Problem: Skin Integrity  Goal: Skin integrity is maintained or improved  Outcome: Progressing     Problem: Fall Risk  Goal: Patient will remain free from falls  Outcome: Progressing       Patient is not progressing towards the following goals:      Problem: Hemodynamics  Goal: Patient's hemodynamics, fluid balance and neurologic status will be stable or improve  Outcome: Not Met     Problem: Urinary - Renal Perfusion  Goal: Ability to achieve and maintain adequate renal perfusion and functioning will improve  Outcome: Not Met

## 2024-06-23 NOTE — CARE PLAN
The patient is Watcher - Medium risk of patient condition declining or worsening    Shift Goals  Clinical Goals: hemodynamic stability  Patient Goals: pain control  Family Goals: RAMILA    Progress made toward(s) clinical / shift goals:    Problem: Knowledge Deficit - Standard  Goal: Patient and family/care givers will demonstrate understanding of plan of care, disease process/condition, diagnostic tests and medications  Outcome: Progressing     Problem: Pain - Standard  Goal: Alleviation of pain or a reduction in pain to the patient’s comfort goal  Outcome: Progressing     Problem: Hemodynamics  Goal: Patient's hemodynamics, fluid balance and neurologic status will be stable or improve  Outcome: Progressing     Problem: Fluid Volume  Goal: Fluid volume balance will be maintained  Outcome: Progressing     Problem: Urinary - Renal Perfusion  Goal: Ability to achieve and maintain adequate renal perfusion and functioning will improve  Outcome: Progressing     Problem: Respiratory  Goal: Patient will achieve/maintain optimum respiratory ventilation and gas exchange  Outcome: Progressing     Problem: Skin Integrity  Goal: Skin integrity is maintained or improved  Outcome: Progressing     Problem: Fall Risk  Goal: Patient will remain free from falls  Outcome: Progressing

## 2024-06-23 NOTE — PROGRESS NOTES
4 Eyes Skin Assessment Completed by SAMMY Garza and ASMMY Hernandez.    Head WDL  Ears Redness and Blanching          Nose WDL  Mouth WDL  Neck WDL  Breast/Chest WDL  Shoulder Blades WDL  Spine WDL  (R) Arm/Elbow/Hand Redness, Blanching, Bruising, Swelling, and Edema  (L) Arm/Elbow/Hand Redness, Blanching, Bruising, Abrasion, Scab, Swelling, and Edema    Abdomen Redness, Blanching, Bruising, and Incision, puncture site          Groin Redness, Blanching, and Rash    Scrotum/Coccyx/Buttocks Redness, Blanching, Excoriation, and Moisture Fissure, bruising    (R) Leg Redness, Blanching, Bruising, Swelling, and Edema, discoloration  (L) Leg Redness, Blanching, Bruising, Swelling, and Edema, discoloration  (R) Heel/Foot/Toe Redness, Blanching, Swelling, and Edema    (L) Heel/Foot/Toe Redness, Blanching, Swelling, and Edema            Devices In Places ECG, Tele Box, Blood Pressure Cuff, Pulse Ox, Vazquez, and Nasal Cannula      Interventions In Place Gray Ear Foams, NC W/Ear Foams, Heel Mepilex, Sacral Mepilex, Pillows, Low Air Loss Mattress, Barrier Cream, Dri-Cj Pads, and Heels Loaded W/Pillows    Possible Skin Injury Yes    Pictures Uploaded Into Epic Yes  Wound Consult Placed Yes  RN Wound Prevention Protocol Ordered Yes

## 2024-06-23 NOTE — PROGRESS NOTES
[facility] Services Progress Notes    Hemodialysis treatment #2 x 2.5 hours completed as ordered per Dr. Zamora  Treatment performed at bedside (IMCU) started at 1008 and ended at 1239.      Net UF Removed: 1000 mL    Dialysis Input: 500 mL (200 mL prime + 300 mL rinseback)  Dialysis Output: 1500 mL    Hemodialysis treatment orders and patient labs reviewed.  Procedure and plan of treatment explained, verbalized understanding.  Consent for treatment verified on chart, patient agreeable to treatment.  Patient and dialysis access assessed pre and post treatment.  Seen by nephrologist prior to initiation of treatment.  Patient tolerated treatment well with soft BPs during last hour mins into treatment, asymptomatic otherwise, PCN aware. Midodrine 15 mg PO administered by PCN per MAR.  Patient on one vasopressor for blood pressure support per ICU parameters.  Right IJ tunneled HD catheter access with good patency and flow x 2  Patient overall stable during and post treatment.   See dialysis flow sheets under media for details.      Post tx access: Right IJ tunneled HD catheter flushed with saline then locked with Heparin 1000 units/mL per designated amount in each lumen (see MAR) then clamped, capped aseptically and labeled properly. CVC dressings changed aseptically per policy and labeled properly. No s/s of infection to HD catheter site. Heparin lock to be aspirated prior to next dialysis/CVC use by dialysis RN only. Please do not flush or draw from ports.    Please notify Nephrologist/Dialysis for follow-up.     Report given to primary care nurse ANGELICA Shaver RN.

## 2024-06-23 NOTE — PROGRESS NOTES
Critical Care Medicine   Brief Note    Date of service: 6/23/2024  Time: 5:57 AM    Contacted for possible upgrade from IMCU. Patient transferred from ICU 2 days ago after being admitted for BACILIO in the setting of decompensated cirrhosis and septic shock. On midodrine, albumin for possible HRS. Started on RRT yesterday and subsequently needed re-initiation of Norepinephrine at very low doses. Through out the evening he has had slightly increased NE requirements from 0.05 -> 0.11. He has just received his AM albumin dose and his current BP is 135/87. He states he feels fine, denies lightheadedness, CP or dyspnea. He does complain of abdominal tightness.    Given his minimal increase in vasopressor requirements, lack of symptoms related to hypotension and improved BP he will remain in the IMCU. I have increased his midodrine dosing from 10 mg TID to 15 mg TID.    The patient will remain in IMCU for now, please contact CC for any worsening.    Arturo Ware Jr., D.O.  [facility] Critical Care

## 2024-06-23 NOTE — PROGRESS NOTES
12 hour chart check complete.    Monitor summary  Rhythm SR 70s -90s bpm  Ectopy: rare

## 2024-06-23 NOTE — PROGRESS NOTES
Hospital Medicine Daily Progress Note    Date of Service  6/23/2024    Chief Complaint  Ryley Gaines is a 53 y.o. male admitted 6/18/2024 with acute kidney injury    Hospital Course    53 y.o. 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  Patient was recently admitted to [facility] where he was found to have pancreatitis and gallstone.  Patient remained ERCP with sphincterectomy and no obstruction was identified.  Patient has an upcoming appointment with the [facility] on July 12, 2024.  Patient found to have hyponatremia, acute kidney injury and hyperkalemia.  He also underwent paracentesis that showed elevated white blood cell count and he was placed on ceftriaxone, Flagyl and he was also placed on norepinephrine due to hypotension.  He was also started on Lasix drip and nephrology was consulted.     On June 28, 2024 intensivist Dr. Weaver [facility]    Interval Problem Update    06/21/24    I evaluated and examined him at the bedside.  He reported that he is feeling nauseous.  I discussed plan of care with nephrologist at the bedside.  I discussed plan of care with patient regarding use of Levophed and ongoing use of IV albumin.  I am continuing IV Lasix.  Continue to monitor input and output.  Continue to keep Vazquez's catheter due to strict input and output monitoring.  I discussed plan of care with bedside RN.    06/22/24    I evaluated and examined him at the bedside.  He reported that he has been having abdominal pain.  I ordered ultrasound guided paracentesis.  I discussed plan of care with nephrologist and he is going to have tunneled dialysis catheter placement.  He is still requiring Levophed.  I will continue titrating Levophed as per target MAP of  greater than 65 mmHg or systolic blood pressure of greater than 90 mmHg.  I discussed plan of care with him and answered all his questions.  Now he had a bowel movement.  Continue lactulose.    06/23/24    I evaluated and examined him at the bedside  He reported that his abdominal pain now has slightly improved.  He has been having bowel movements.  I discussed plan of care with nephrologist.  At the time of evaluation he is getting hemodialysis.  He is still requiring Levophed.  I am continuing to titrate Levophed as per target MAP greater than 65 mmHg or systolic blood pressure of greater than 90 mmHg.  Plan is to perform ultrasound-guided paracentesis today and resume his heparin for DVT prophylaxis.  I discussed plan of care with patient and his wife at the bedside and answered all of their questions.      I have discussed this patient's plan of care and discharge plan at IDT rounds today with Case Management, Nursing, Nursing leadership, and other members of the IDT team.    Consultants/Specialty  critical care and nephrology    Code Status  Full Code    Disposition  The patient is not medically cleared for discharge to home or a post-acute facility.      I have placed the appropriate orders for post-discharge needs.    Review of Systems  Review of Systems   Constitutional:  Positive for malaise/fatigue. Negative for chills, fever and weight loss.   HENT:  Negative for hearing loss and tinnitus.    Eyes:  Negative for blurred vision, double vision, photophobia and pain.   Respiratory:  Negative for cough, sputum production and shortness of breath.    Cardiovascular:  Negative for chest pain, palpitations, orthopnea and leg swelling.   Gastrointestinal:  Positive for abdominal pain (Improved) and nausea. Negative for constipation, diarrhea and vomiting.   Genitourinary:  Negative for dysuria, frequency and urgency.   Musculoskeletal:  Negative for back pain, joint pain, myalgias and neck pain.   Skin:  Negative  for rash.   Neurological:  Negative for dizziness, tingling, tremors, sensory change, speech change, focal weakness and headaches.   Psychiatric/Behavioral:  Negative for hallucinations and substance abuse.    All other systems reviewed and are negative.       Physical Exam  Temp:  [35.7 °C (96.2 °F)-36.7 °C (98 °F)] 36.3 °C (97.4 °F)  Pulse:  [] 79  Resp:  [10-38] 14  BP: ()/(41-81) 97/49  SpO2:  [88 %-98 %] 94 %    Physical Exam  Vitals reviewed.   Constitutional:       General: He is not in acute distress.     Comments: He is lying in bed without any acute distress and getting hemodialysis.   HENT:      Head: Normocephalic and atraumatic. No contusion.      Right Ear: External ear normal.      Left Ear: External ear normal.      Nose: Nose normal.      Mouth/Throat:      Pharynx: No oropharyngeal exudate.   Eyes:      General:         Right eye: No discharge.         Left eye: No discharge.      Pupils: Pupils are equal, round, and reactive to light.   Cardiovascular:      Rate and Rhythm: Normal rate and regular rhythm.      Heart sounds: No murmur heard.     No friction rub. No gallop.      Comments: Central line  Pulmonary:      Effort: Pulmonary effort is normal.      Breath sounds: No wheezing or rhonchi.   Abdominal:      General: Bowel sounds are normal. There is distension.      Palpations: Abdomen is soft.      Tenderness: There is abdominal tenderness. There is no rebound.      Comments: Generalized mild pain  No signs of acute abdomen on physical exam.     Musculoskeletal:         General: No swelling or tenderness. Normal range of motion.      Cervical back: No rigidity. No muscular tenderness.      Right lower leg: Edema (Improving) present.      Left lower leg: Edema (Improving) present.   Skin:     General: Skin is warm and dry.      Coloration: Skin is not jaundiced.   Neurological:      General: No focal deficit present.      Mental Status: He is alert and oriented to person, place,  and time.      Cranial Nerves: No cranial nerve deficit.      Sensory: No sensory deficit.      Comments: He is following commands and moving all his extremities   Psychiatric:         Mood and Affect: Mood normal.         Fluids    Intake/Output Summary (Last 24 hours) at 6/23/2024 1517  Last data filed at 6/23/2024 1310  Gross per 24 hour   Intake 1195.38 ml   Output 1670 ml   Net -474.62 ml       Laboratory  Recent Labs     06/21/24  0055 06/22/24  0002 06/23/24  0406   WBC 11.7* 10.2 14.9*   RBC 2.89* 2.67* 2.91*   HEMOGLOBIN 9.5* 8.7* 9.4*   HEMATOCRIT 26.9* 24.6* 27.1*   MCV 93.1 92.1 93.1   MCH 32.9 32.6 32.3   MCHC 35.3 35.4 34.7   RDW 53.3* 52.1* 53.9*   PLATELETCT 103* 87* 77*   MPV 10.6 11.4 11.6     Recent Labs     06/22/24  2205 06/23/24  0406 06/23/24  0801   SODIUM 125* 124* 124*   POTASSIUM 4.3 4.4 4.5   CHLORIDE 86* 86* 84*   CO2 18* 18* 16*   GLUCOSE 97 98 95   BUN 87* 88* 90*   CREATININE 7.26* 7.22* 6.99*   CALCIUM 8.4* 8.5 8.8     Recent Labs     06/22/24  0818   INR 2.53*               Imaging  EC-ECHOCARDIOGRAM COMPLETE W/O CONT   Final Result      IR-LIN,GROSHONG PLACEMENT >5   Final Result      1. Ultrasound and fluoroscopic guided placement of a right internal jugular 14.5 U[place]bek HemoSplit tunneled dialysis catheter.      2. The hemodialysis catheter may be used immediately as clinically indicated. Flushes per protocol.         US-LIVER AND VESSELS LTD (TIPS) (COMBO)   Final Result      1. Patent TIPS shunt.   2. Cirrhotic morphology of the liver and ascites.   3. Sludge and stones in the gallbladder lumen. Gallbladder wall thickening is secondary to adjacent ascites.      OUTSIDE IMAGES-CT ABDOMEN /PELVIS   Final Result      US-PARACENTESIS, ABD WITH IMAGING    (Results Pending)        Assessment/Plan  * Acute renal failure (ARF) (HCC)- (present on admission)  Assessment & Plan  Normal creatinine when discharged on 6/8/2024, up to 9.7 on admit but improving now 7.6  Bicarb gtt. now  discontinued.  Nephrology has been following him  ? Type I hepatorenal vs ATN from sepsis  Norepinephrine and he has been receiving albumin.  I personally discussed case with nephrologist now plan is to initiate dialysis today.  He underwent tunneled dialysis catheter placement on June 22, 2024.  I discussed plan of care with nephrology.  Continue hemodialysis 3 days a day second.  Discussed plan of care with patient and his wife at the bedside.  He is still requiring Levophed due to his ongoing hypotension.      Septic shock (HCC)- (present on admission)  Assessment & Plan  This is Septic shock Present on admission  SIRS criteria identified on my evaluation include: Tachycardia, with heart rate greater than 90 BPM, Tachypnea, with respirations greater than 20 per minute, and Leukocytosis, with WBC greater than 12,000  Clinical indicators of end organ dysfunction include Hypotension with systolic blood pressure less than 90 or MAP less than 65 and Lactic Acid greater than 2  Indicators of septic shock include: Sepsis present and persistent hypotension despite fluid resuscitation   Sources is: Possibly bacterial peritonitis  Sepsis protocol initiated  Crystalloid Fluid Administration: Fluid resuscitation ordered per standard protocol - 30 mL/kg per current or ideal body weight.  Given at outside hospital  IV antibiotics as appropriate for source of sepsis  Reassessment: I have reassessed the patient's hemodynamic status  Pancultures ordered, follow-up culture from [facility]    6/18: wbc 20, levophed 0.22 mcgs  6/19: wbc 14.7, levophed and vaopressin    6/20 Now is requiring Levophed to maintain blood pressure.  Continue ceftriaxone.  I discussed plan of care with intensivist no plans to transfer him to [facility].  Follow-up on culture results.    6/21 I am continuing IV ceftriaxone, IV albumin and he still requiring Levophed to maintain blood pressure..    06/22/24 ongoing hypertension.  I am continuing Levophed as  per target MAP of greater than 65 mmHg systolic blood pressure of greater than 90 mmHg.    06/23/24 he remains on Levophed.  I am continuing titrating Levophed to target MAP of greater than 65 mmHg systolic blood pressure greater than 90 mmHg.    Hyponatremia- (present on admission)  Assessment & Plan  Acute on chronic, hypervolemic  Admitted with 117  Due to cirrhosis  Current sodium level is 124  Continue monitor sodium level closely  I discussed plan of care with nephrologist.    Cirrhosis (HCC)- (present on admission)  Assessment & Plan  History of cirrhosis, MELD on admit 33  Etiology: Hereditary hemochromatosis versus EtOH  Status post TIPS  I am continuing norepinephrine  Ultrasound showed TIPS shunt patent  Panculture negative including para * 2 on ceftriaxone (SBP by wbc criteria)  Continue to monitor closely  On physical exam he found to have abdominal distention I ordered ultrasound-guided paracentesis.  I placed a hold on heparin.  Plan is to get ultrasound-guided paracentesis today and resume his heparin.    Hereditary hemochromatosis (HCC)- (present on admission)  Assessment & Plan  Possible history of  Iron 62  Has required phlebotomy in the past however that was 6 to 7 years ago  Continue monitor CBC to evaluate for hemoglobin current hemoglobin is 8.7.    Pancreatitis- (present on admission)  Assessment & Plan  History of possible gallstone pancreatitis  Pancreatitis  with levels 697 on admit  He denies complaint of severe abdominal pain.  Now resume diet.        I discussed plan of care during multidisciplinary rounds regarding patient's current medical condition and plan of care.      I personally discussed case with nephrologist Dr. Zamora regarding Mr. Gaines regarding his ongoing dialysis.      Patient is critically ill.   The patient continues to have: Hypotension  The vital organ system that is affected is the: Cardiovascular system  If untreated there is a high chance of deterioration into:  Cardiovascular collapse  And eventually death.   The critical care that I am providing today is: I am continuing and titrating Levophed as per target map of greater than 65 mmHg or systolic blood pressure of greater than 90 mmHg.  At the time of evaluation patient is requiring Levophed.  The critical that has been undertaken is medically complex.   There has been no overlap in critical care time.   Critical Care Time not including procedures: 39 minutes          Plan is to resume heparin for DVT prophylaxis after ultrasound-guided paracentesis.  VTE prophylaxis:   SCDs/TEDs      I have performed a physical exam and reviewed and updated ROS and Plan today (6/23/2024). In review of yesterday's note (6/22/2024), there are no changes except as documented above.

## 2024-06-23 NOTE — PROGRESS NOTES
[facility] -  PROGRESS NOTE               Author: Winifred Zamora M.D. Date & Time: 6/23/2024  10:21 AM     HPI:  53 y.o. male with h/o HTN, hereditary hemochromatosis, cirrhosis complicated by ascites requiring TIPS, who presented to outside hospital with c/o generalized weakness, nausea, vomiting, decreased p.o. intake as well as  decreased urine output for the past several days.  He was found to be hypotensive 60 mmHg SBP on arrival .   Initial labs noted for Na 119, K 5.4 bicarb 13 and elevated Cr. Pt had diagnostic paracentetics with evidence of SBP.   Pt treated with 2 L of crystalloids, albumin 50 g and IV ceftriaxone and Flagyl.  He was transferred to [facility] for further evaluation and continuation of care.  Lab work upon arrival to [facility] showed WBC 21, S Na 117 ( 6/18 2.30 ) improved to 119 on AM labs, K 5.4, CO2 12, BUN 85, Cr 8.8, total bilirubin 2.9, lipase 697, urine sodium less than 20, UA moderate bilirubin positive nitrite, 3-5 WBCs 0-2 RBC and few bacteria  Blood pressure has since improved 90s to 100 systolic, he is on room air.  No urine output documented so far.  Patient is currently receiving albumin 25 g every 6 hours, and on norepinephrine .  Of note he was recently hospitalized for  gallstone pancreatitis status post ERCP and sphincterotomy, hospital course complicated by BACILIO in the setting of IV contrast and pancreatitis. He was seen by nephrology at th time.  Cr has  increased to 2.1 from 0.8 2/1.Cr has improved to baseline 0.8 6/8.   Pt has hx of chronic hyponatremia with serum sodium 130-134 during last admission   S Na . Pt was discharged on Lasix, spirolactone, lisinopril. Other pertinent home meds gabapentin and Trazodone.   Pt is alert and oriented, but  slow to answer questions. He has been taking motrin daily for abdominal pain following recent hospital admission. Pt says he has not used any ETOH at least for the past year or so.   No  "F/C/CP/SOB.  No melena, hematochezia, hematemesis.  No HA, visual changes.    DAILY NEPHROLOGY SUMMARY:  6/18: consult done   6/19: No acute events, more alert today, answering questions appropriately,  cc/ 24 hrs, received lasix 100 mg earlier today, seem to be responding to IV lasix,  cc s/p lasix. Increase abdominal distention, wife at bed side, on vasopressor and NE, on room air, plan for paracentesis today   6/20: no acute events, UOP 3375 cc/24 hrs, on clears,on low dose NE,s/p paracentesis yesterday 6 L removed, wife at bed side, S Na improved to 123 appropriate rise, Cr improved to 7.62, per RN UOP slowing down 60 cc/ hr since am  appetite still poor.   6/21: Pt says he his feeling nauseous, still poor appetite, urine out put dropped 875 cc/24 hrs, on lasix gtt 5 mg/ hr, on pressors NE  6/22: UOP has dropped 215 cc over the past 24 hrs, pt reports abdominal discomfort and distention, low BP  systolic  6/23: HD yesterday #1 net  cc, pt seen on HD today, 205 cc UOP /24 hrs, remains on low dose levophed, no complaints besides abdominal pain , pending paracentesis     REVIEW OF SYSTEMS:    10 point ROS reviewed and is as per HPI/daily summary or otherwise negative    PMH/PSH/SH/FH:  Reviewed and unchanged since admission note    CURRENT MEDICATIONS:  Reviewed from admission to present day    VS:  /51   Pulse 92   Temp 36.3 °C (97.4 °F)   Resp 17   Ht 1.905 m (6' 3\")   Wt (!) 131 kg (289 lb 7.4 oz)   SpO2 97%   BMI 36.18 kg/m²   Physical Exam  Constitutional:       General: He is not in acute distress.     Appearance: He is ill-appearing.   HENT:      Head: Normocephalic.      Mouth/Throat:      Mouth: Mucous membranes are moist.   Eyes:      General: Scleral icterus present.   Cardiovascular:      Rate and Rhythm: Normal rate.   Pulmonary:      Effort: Pulmonary effort is normal.   Abdominal:      General: There is distension.   Musculoskeletal:         General: Swelling " present.      Comments: RIJ tunneled Cath    Skin:     General: Skin is warm.   Neurological:      Mental Status: He is oriented to person, place, and time.   Psychiatric:         Mood and Affect: Mood normal.         Fluids:    Intake/Output Summary (Last 24 hours) at 6/23/2024 1021  Last data filed at 6/23/2024 0800  Gross per 24 hour   Intake 1179.98 ml   Output 1270 ml   Net -90.02 ml       LABS:  Labs reviewed, pertinent labs below.    Recent Labs     06/21/24  0055 06/22/24  0002 06/23/24  0406   WBC 11.7* 10.2 14.9*   RBC 2.89* 2.67* 2.91*   HEMOGLOBIN 9.5* 8.7* 9.4*   HEMATOCRIT 26.9* 24.6* 27.1*   MCV 93.1 92.1 93.1   MCH 32.9 32.6 32.3   MCHC 35.3 35.4 34.7   RDW 53.3* 52.1* 53.9*   PLATELETCT 103* 87* 77*   MPV 10.6 11.4 11.6       Recent Labs     06/22/24  2205 06/23/24  0406 06/23/24  0801   SODIUM 125* 124* 124*   POTASSIUM 4.3 4.4 4.5   CHLORIDE 86* 86* 84*   CO2 18* 18* 16*   GLUCOSE 97 98 95   BUN 87* 88* 90*   CREATININE 7.26* 7.22* 6.99*   CALCIUM 8.4* 8.5 8.8        IMAGING:  All imaging reviewed from admission to present day      IMPRESSION:  # BACILIO oliguric   - Suspect sec to  ischemic ATN in the setting of unstable hemodynamics, decrease EABV, septic shock, SBP, NSAIDs, and bile cast nephropathy   - On initial presentation there was low suspicion for HRS given sepsis picture, some improvement  in renal function early in the course but worsened again,suspect HRS, abdominal compartment syndrome from tense ascites   - No evidence of obstruction on CT from outside hospital   - Recent Hx of BACILIO, Cr improved to 0.8 6/8   - HD started 6/22 via RIJ PC  # Cirrhosis, MELD 33   - Possible etiologies hereditary hemochromatosis versus history of alcoholism   - ? Liver transplant candidacy per previous evaluation    - No recent ETOH use for about a year   # Ascites   # Hyponatremia   - Suspect sec to cirrhosis, decreased EABV altering ADH secretion   - Also gabapentin and trazodone can alter ADH secretion  and may have contributed , on hold   # Septic shock likely sec to SBP, leukocytosis resolved    # Metabolic acidosis   # SBP   # Hypervolemia, total body volume overload with decrease EABV  # Thrombocytopenia   # Anemia   - No need of BARBARA in BACILIO   # Low serum albumin   # Hyperphosphatemia, hypocalcemia         SUGGESTIONS:  - HD again today day  2/3   - Will need paracentesis to relive abdominal pressure in an attempt to  improve renal perfusion   - Daily evaluation for RRT needs  - Continue vasopressors  PRN to maintain MAP > 65 mmHg  - Increase Midodrine 15  mg tid   - Raise serum Na in small increments 4-6 mEq/L in 24 hrs as pt  high risk for ODS  - Maintain serum Na at 130 mEq/L until 6/24 6 AM   - Monitor serum Na q 12   hrs   - IV albumin for SBP  - Ab per primary svc   - Avoid nephrotoxins   - Adjust meds for renal function   - Holding  gabapentin and trazodone  - Limit fluid intake 1200 cc/d   - Ca acetate 1334  mg tid ac   - Transfuse PRBC PRN  - Holding diuretics      Discussed  the above with hospitalist and RN

## 2024-06-24 NOTE — PROGRESS NOTES
[facility] Progress Notes:    Net UF 1000ml    3 hours of hemodialysis completed with a net UF of 1000ml as ordered. Patient tolerated treatment well, with levophed titrated at 14.3mcg/min from start to finish. HR was in the 80s at the beginning of treatment, increased to the 100s, but was mostly in the 90s. 10 minutes before end of treatment, the patient used the commode despite being informed of the possibility of passing out.  Hospital staff were present at the bedside and assisted him. CVC dressing not indicated to be changed. Ports were packed with heparin as ordered. A report was given to the primary nurse.

## 2024-06-24 NOTE — CARE PLAN
The patient is Stable - Low risk of patient condition declining or worsening    Shift Goals  Clinical Goals: SBP>90 MAP >65, pain control  Patient Goals: Pain control, rest  Family Goals: RAMILA    Progress made toward(s) clinical / shift goals:  Levo drip for maintaining ordered parameters, medicate per MAR for pain control, cluster care for adequate rest.     Problem: Knowledge Deficit - Standard  Goal: Patient and family/care givers will demonstrate understanding of plan of care, disease process/condition, diagnostic tests and medications  Outcome: Progressing     Problem: Pain - Standard  Goal: Alleviation of pain or a reduction in pain to the patient’s comfort goal  Outcome: Progressing     Problem: Respiratory  Goal: Patient will achieve/maintain optimum respiratory ventilation and gas exchange  Outcome: Progressing       Patient is not progressing towards the following goals:

## 2024-06-24 NOTE — PROGRESS NOTES
[facility] -  PROGRESS NOTE               Author: Parish West M.D. Date & Time: 6/24/2024  4:19 PM     HPI:  53 y.o. male with h/o HTN, hereditary hemochromatosis, cirrhosis complicated by ascites requiring TIPS, who presented to outside hospital with c/o generalized weakness, nausea, vomiting, decreased p.o. intake as well as  decreased urine output for the past several days.  He was found to be hypotensive 60 mmHg SBP on arrival .   Initial labs noted for Na 119, K 5.4 bicarb 13 and elevated Cr. Pt had diagnostic paracentetics with evidence of SBP.   Pt treated with 2 L of crystalloids, albumin 50 g and IV ceftriaxone and Flagyl.  He was transferred to [facility] for further evaluation and continuation of care.  [place] [facility] [place]BC 21, S Na 117 ( 6/18 2.30 ) improved to 119 on AM labs, K 5.4, CO2 12, BUN 85, Cr 8.8, total bilirubin 2.9, lipase 697, urine sodium less than 20, UA moderate bilirubin positive nitrite, 3-5 WBCs 0-2 RBC and few bacteria  Blood pressure has since improved 90s to 100 systolic, he is on room air.  No urine output documented so far.  Patient is currently receiving albumin 25 g every 6 hours, and on norepinephrine .  Of note he was recently hospitalized for  gallstone pancreatitis status post ERCP and sphincterotomy, hospital course complicated by BACILIO in the setting of IV contrast and pancreatitis. He was seen by nephrology at th time.  Cr has  increased to 2.1 from 0.8 2/1.Cr has improved to baseline 0.8 6/8.   Pt has hx of chronic hyponatremia with serum sodium 130-134 during last admission   S Na . Pt was discharged on Lasix, spirolactone, lisinopril. Other pertinent home meds gabapentin and Trazodone.   Pt is alert and oriented, but  slow to answer questions. He has been taking motrin daily for abdominal pain following recent hospital admission. Pt says he has not used any ETOH at least for the past year or so.   No  "F/C/CP/SOB.  No melena, hematochezia, hematemesis.  No HA, visual changes.    DAILY NEPHROLOGY SUMMARY:  6/18: consult done   6/19: No acute events, more alert today, answering questions appropriately,  cc/ 24 hrs, received lasix 100 mg earlier today, seem to be responding to IV lasix,  cc s/p lasix. Increase abdominal distention, wife at bed side, on vasopressor and NE, on room air, plan for paracentesis today   6/20: no acute events, UOP 3375 cc/24 hrs, on clears,on low dose NE,s/p paracentesis yesterday 6 L removed, wife at bed side, S Na improved to 123 appropriate rise, Cr improved to 7.62, per RN UOP slowing down 60 cc/ hr since am  appetite still poor.   6/21: Pt says he his feeling nauseous, still poor appetite, urine out put dropped 875 cc/24 hrs, on lasix gtt 5 mg/ hr, on pressors NE  6/22: UOP has dropped 215 cc over the past 24 hrs, pt reports abdominal discomfort and distention, low BP  systolic  6/23: HD yesterday #1 net  cc, pt seen on HD today, 205 cc UOP /24 hrs, remains on low dose levophed, no complaints besides abdominal pain , pending paracentesis   6/24: Patient seen on hemodialysis today, tolerated 1 L UF, remains on low-dose Levophed to maintain MAP, answer simple questions but remains confused    REVIEW OF SYSTEMS:    10 point ROS reviewed and is as per HPI/daily summary or otherwise negative    PMH/PSH/SH/FH:  Reviewed and unchanged since admission note    CURRENT MEDICATIONS:  Reviewed from admission to present day    VS:  BP 98/53   Pulse 94   Temp 36.4 °C (97.6 °F) (Temporal)   Resp 13   Ht 1.905 m (6' 3\")   Wt 121 kg (265 lb 14 oz)   SpO2 91%   BMI 33.23 kg/m²   Physical Exam  Constitutional:       General: He is not in acute distress.     Appearance: He is ill-appearing.   HENT:      Head: Normocephalic.      Mouth/Throat:      Mouth: Mucous membranes are moist.   Eyes:      General: Scleral icterus present.   Cardiovascular:      Rate and Rhythm: " Normal rate.   Pulmonary:      Effort: Pulmonary effort is normal.   Abdominal:      General: There is distension.   Musculoskeletal:         General: Swelling present.      Comments: RIJ tunneled Cath    Skin:     General: Skin is warm.   Neurological:      Mental Status: He is oriented to person, place, and time.   Psychiatric:         Mood and Affect: Mood normal.         Fluids:    Intake/Output Summary (Last 24 hours) at 6/24/2024 1619  Last data filed at 6/24/2024 1527  Gross per 24 hour   Intake 1509.81 ml   Output 800 ml   Net 709.81 ml       LABS:  Labs reviewed, pertinent labs below.    Recent Labs     06/22/24  0002 06/23/24  0406 06/24/24  0440   WBC 10.2 14.9* 14.6*   RBC 2.67* 2.91* 2.78*   HEMOGLOBIN 8.7* 9.4* 9.3*   HEMATOCRIT 24.6* 27.1* 26.3*   MCV 92.1 93.1 94.6   MCH 32.6 32.3 33.5*   MCHC 35.4 34.7 35.4   RDW 52.1* 53.9* 55.3*   PLATELETCT 87* 77* 61*   MPV 11.4 11.6 11.6       Recent Labs     06/23/24  0801 06/23/24  2204 06/24/24  0440   SODIUM 124* 123* 123*   POTASSIUM 4.5 4.2 4.2   CHLORIDE 84* 86* 86*   CO2 16* 20 20   GLUCOSE 95 97 106*   BUN 90* 68* 71*   CREATININE 6.99* 5.84* 5.96*   CALCIUM 8.8 8.7 8.8        IMAGING:  All imaging reviewed from admission to present day      IMPRESSION:  # BACILIO oliguric   - Suspect sec to  ischemic ATN in the setting of unstable hemodynamics, decrease EABV, septic shock, SBP, NSAIDs, and bile cast nephropathy   - On initial presentation there was low suspicion for HRS given sepsis picture, some improvement  in renal function early in the course but worsened again,suspect HRS, abdominal compartment syndrome from tense ascites   - No evidence of obstruction on CT from outside hospital   - Recent Hx of BACILIO, Cr improved to 0.8 6/8   - HD started 6/22 via [facility] PC  # Cirrhosis, MELD 33   - Possible etiologies hereditary hemochromatosis versus history of alcoholism   - ? Liver transplant candidacy per previous evaluation    - No recent ETOH use for about a year    # Ascites   # Hyponatremia   - Suspect sec to cirrhosis, decreased EABV altering ADH secretion   - Also gabapentin and trazodone can alter ADH secretion and may have contributed , on hold   # Septic shock likely sec to SBP, leukocytosis resolved    # Metabolic acidosis   # SBP   # Hypervolemia, total body volume overload with decrease EABV  # Thrombocytopenia   # Anemia   - No need of BARBARA in BACILIO   # Low serum albumin   # Hyperphosphatemia, hypocalcemia         SUGGESTIONS:  - HD again today day  3/3   -  Continue vasopressors  PRN to maintain MAP > 65 mmHg  -Continue p.o. midodrine 15  mg tid   -Further antibiotics per primary svc   - Avoid nephrotoxins   - Adjust meds for renal function   - Holding  gabapentin and trazodone and lieu of renal failure  - Limit fluid intake 1200 cc/d   -Continue Ca acetate 1334  mg tid ac for phosphorus binding  - Transfuse PRBC PRN     Prognosis remains guarded, will likely continue to need ongoing dialysis

## 2024-06-24 NOTE — CARE PLAN
The patient is Watcher - Medium risk of patient condition declining or worsening    Shift Goals  Clinical Goals: SBP >90 map >65 pain control  Patient Goals: pain control rest  Family Goals: daryl    Progress made toward(s) clinical / shift goals:    Problem: Knowledge Deficit - Standard  Goal: Patient and family/care givers will demonstrate understanding of plan of care, disease process/condition, diagnostic tests and medications  Outcome: Progressing     Problem: Pain - Standard  Goal: Alleviation of pain or a reduction in pain to the patient’s comfort goal  Outcome: Progressing     Problem: Hemodynamics  Goal: Patient's hemodynamics, fluid balance and neurologic status will be stable or improve  Outcome: Progressing     Problem: Fluid Volume  Goal: Fluid volume balance will be maintained  Outcome: Progressing     Problem: Urinary - Renal Perfusion  Goal: Ability to achieve and maintain adequate renal perfusion and functioning will improve  Outcome: Progressing     Problem: Respiratory  Goal: Patient will achieve/maintain optimum respiratory ventilation and gas exchange  Outcome: Progressing     Problem: Skin Integrity  Goal: Skin integrity is maintained or improved  Outcome: Progressing     Problem: Fall Risk  Goal: Patient will remain free from falls  Outcome: Progressing       Patient is not progressing towards the following goals:

## 2024-06-24 NOTE — PROGRESS NOTES
Updated patients Wife Bandar on plan of care and patient condition at this time. All questions answered at this time.

## 2024-06-24 NOTE — PROGRESS NOTES
Updated brother Keith Eder with patient permission on plan of care and updates. All questions answered at this time.

## 2024-06-24 NOTE — PROGRESS NOTES
Hospital Medicine Daily Progress Note    Date of Service  6/24/2024    Chief Complaint  Ryley Gaines is a 53 y.o. male admitted 6/18/2024 with acute kidney injury    Hospital Course    53 y.o. 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  Patient was recently admitted to [facility] where he was found to have pancreatitis and gallstone.  Patient remained ERCP with sphincterectomy and no obstruction was identified.  Patient has an upcoming appointment with the [facility] on July 12, 2024.  Patient found to have hyponatremia, acute kidney injury and hyperkalemia.  He also underwent paracentesis that showed elevated white blood cell count and he was placed on ceftriaxone, Flagyl and he was also placed on norepinephrine due to hypotension.  He was also started on Lasix drip and nephrology was consulted.     On June 28, 2024 intensivist Dr. Weaver request to transfer care to \[facility]\""ist service    Interval Problem Update    06/21/24    I evaluated and examined him at the bedside.  He reported that he is feeling nauseous.  I discussed plan of care with nephrologist at the bedside.  I discussed plan of care with patient regarding use of Levophed and ongoing use of IV albumin.  I am continuing IV Lasix.  Continue to monitor input and output.  Continue to keep Vazquez's catheter due to strict input and output monitoring.  I discussed plan of care with bedside RN.    06/22/24    I evaluated and examined him at the bedside.  He reported that he has been having abdominal pain.  I ordered ultrasound guided paracentesis.  I discussed plan of care with nephrologist and he is going to have tunneled dialysis catheter placement.  He is still requiring Levophed.  I will continue titrating Levophed as per target MAP of  greater than 65 mmHg or systolic blood pressure of greater than 90 mmHg.  I discussed plan of care with him and answered all his questions.  Now he had a bowel movement.  Continue lactulose.    06/24/24    I evaluated and examined him at the bedside  He reported his abdominal pain has significantly improved.  He underwent paracentesis yesterday.  At the time of evaluation he is getting hemodialysis.  He is still requiring Levophed.  I am continuing titrating Levophed as per target MAP of greater than 65 mmHg or systolic blood pressure of greater than 90 mmHg.    I have discussed this patient's plan of care and discharge plan at IDT rounds today with Case Management, Nursing, Nursing leadership, and other members of the IDT team.    Consultants/Specialty  critical care and nephrology    Code Status  Full Code    Disposition  Medically Cleared  I have placed the appropriate orders for post-discharge needs.    Review of Systems  Review of Systems   Constitutional:  Positive for malaise/fatigue. Negative for chills, fever and weight loss.   HENT:  Negative for hearing loss and tinnitus.    Eyes:  Negative for blurred vision, double vision, photophobia and pain.   Respiratory:  Negative for cough, sputum production and shortness of breath.    Cardiovascular:  Negative for chest pain, palpitations, orthopnea and leg swelling.   Gastrointestinal:  Positive for nausea. Negative for abdominal pain, constipation, diarrhea and vomiting.   Genitourinary:  Negative for dysuria, frequency and urgency.   Musculoskeletal:  Negative for back pain, joint pain, myalgias and neck pain.   Skin:  Negative for rash.   Neurological:  Negative for dizziness, tingling, tremors, sensory change, speech change, focal weakness and headaches.   Psychiatric/Behavioral:  Negative for hallucinations and substance abuse.    All other systems reviewed and are negative.       Physical Exam  Temp:  [36.1 °C (97 °F)-36.6 °C (97.8 °F)] 36.1 °C (97  °F)  Pulse:  [] 83  Resp:  [7-23] 22  BP: ()/(41-81) 98/46  SpO2:  [89 %-98 %] 91 %    Physical Exam  Vitals reviewed.   Constitutional:       General: He is not in acute distress.     Appearance: He is ill-appearing.      Comments: He is getting dialysis at the time of evaluation   HENT:      Head: Normocephalic and atraumatic. No contusion.      Right Ear: External ear normal.      Left Ear: External ear normal.      Nose: Nose normal.      Mouth/Throat:      Pharynx: No oropharyngeal exudate.   Eyes:      General:         Right eye: No discharge.         Left eye: No discharge.      Pupils: Pupils are equal, round, and reactive to light.   Cardiovascular:      Rate and Rhythm: Normal rate and regular rhythm.      Heart sounds: No murmur heard.     No friction rub. No gallop.      Comments: Central line  Pulmonary:      Effort: Pulmonary effort is normal.      Breath sounds: No wheezing or rhonchi.   Abdominal:      General: Bowel sounds are normal. There is no distension.      Palpations: Abdomen is soft.      Tenderness: There is no abdominal tenderness. There is no rebound.      Comments: No signs of acute abdomen on physical exam.   Musculoskeletal:         General: No swelling or tenderness. Normal range of motion.      Cervical back: No rigidity. No muscular tenderness.      Right lower leg: Edema (Improving) present.      Left lower leg: Edema (Improving) present.   Skin:     General: Skin is warm and dry.      Coloration: Skin is not jaundiced.   Neurological:      General: No focal deficit present.      Mental Status: He is alert and oriented to person, place, and time.      Cranial Nerves: No cranial nerve deficit.      Sensory: No sensory deficit.      Comments: He is following commands and moving all his extremities   Psychiatric:         Mood and Affect: Mood normal.         Fluids    Intake/Output Summary (Last 24 hours) at 6/24/2024 0801  Last data filed at 6/24/2024 0600  Gross per 24  hour   Intake 1609.69 ml   Output 2300 ml   Net -690.31 ml       Laboratory  Recent Labs     06/22/24  0002 06/23/24  0406 06/24/24  0440   WBC 10.2 14.9* 14.6*   RBC 2.67* 2.91* 2.78*   HEMOGLOBIN 8.7* 9.4* 9.3*   HEMATOCRIT 24.6* 27.1* 26.3*   MCV 92.1 93.1 94.6   MCH 32.6 32.3 33.5*   MCHC 35.4 34.7 35.4   RDW 52.1* 53.9* 55.3*   PLATELETCT 87* 77* 61*   MPV 11.4 11.6 11.6     Recent Labs     06/23/24  0801 06/23/24  2204 06/24/24  0440   SODIUM 124* 123* 123*   POTASSIUM 4.5 4.2 4.2   CHLORIDE 84* 86* 86*   CO2 16* 20 20   GLUCOSE 95 97 106*   BUN 90* 68* 71*   CREATININE 6.99* 5.84* 5.96*   CALCIUM 8.8 8.7 8.8     Recent Labs     06/22/24  0818   INR 2.53*               Imaging  US-PARACENTESIS, ABD WITH IMAGING   Final Result      1. Ultrasound-guided diagnostic and therapeutic paracentesis of the right lower quadrant of the abdominal wall.      2. 5,250 mL of fluid withdrawn.      EC-ECHOCARDIOGRAM COMPLETE W/O CONT   Final Result      IR-LIN,GROSHONG PLACEMENT >5   Final Result      1. Ultrasound and fluoroscopic guided placement of a right internal jugular 14.5 Ivorian HemoSplit tunneled dialysis catheter.      2. The hemodialysis catheter may be used immediately as clinically indicated. Flushes per protocol.         US-LIVER AND VESSELS LTD (TIPS) (COMBO)   Final Result      1. Patent TIPS shunt.   2. Cirrhotic morphology of the liver and ascites.   3. Sludge and stones in the gallbladder lumen. Gallbladder wall thickening is secondary to adjacent ascites.      OUTSIDE IMAGES-CT ABDOMEN /PELVIS   Final Result           Assessment/Plan  * Acute renal failure (ARF) (HCC)- (present on admission)  Assessment & Plan  Normal creatinine when discharged on 6/8/2024, up to 9.7 on admit but improving now 7.6  Bicarb gtt. now discontinued.  Nephrology has been following him  ? Type I hepatorenal vs ATN from sepsis  Norepinephrine and he has been receiving albumin.  I personally discussed case with nephrologist now  plan is to initiate dialysis today.  He underwent tunneled dialysis catheter placement on June 22, 2024.  He has been getting dialysis that was started on June 22, 2024.  He is still requiring Levophed.  I am continuing and titrating Levophed as per target MAP of greater than 65 mmHg or systolic blood pressure of greater than 90 mmHg.      Septic shock (HCC)- (present on admission)  Assessment & Plan  This is Septic shock Present on admission  SIRS criteria identified on my evaluation include: Tachycardia, with heart rate greater than 90 BPM, Tachypnea, with respirations greater than 20 per minute, and Leukocytosis, with WBC greater than 12,000  Clinical indicators of end organ dysfunction include Hypotension with systolic blood pressure less than 90 or MAP less than 65 and Lactic Acid greater than 2  Indicators of septic shock include: Sepsis present and persistent hypotension despite fluid resuscitation   Sources is: Possibly bacterial peritonitis  Sepsis protocol initiated  Crystalloid Fluid Administration: Fluid resuscitation ordered per standard protocol - 30 mL/kg per current or ideal body weight.  Given at outside hospital  IV antibiotics as appropriate for source of sepsis  Reassessment: I have reassessed the patient's hemodynamic status  Pancultures ordered, follow-up culture from [facility]    6/18: wbc 20, levophed 0.22 mcgs  6/19: wbc 14.7, levophed and vaopressin    6/20 Now is requiring Levophed to maintain blood pressure.  Continue ceftriaxone.  I discussed plan of care with intensivist no plans to transfer him to [facility].  Follow-up on culture results.    6/21 I am continuing IV ceftriaxone, IV albumin and he still requiring Levophed to maintain blood pressure..    06/22/24 ongoing hypertension.  I am continuing Levophed as per target MAP of greater than 65 mmHg systolic blood pressure of greater than 90 mmHg.    06/23/24 he remains on Levophed.  I am continuing titrating Levophed to target MAP of  greater than 65 mmHg systolic blood pressure greater than 90 mmHg.    06/24/24 he remains on Levophed.  He is getting dialysis that can also decrease his blood pressure in the setting of chronic liver disease.  He does not look septic but remains hypotensive.  I am continuing the titrating Levophed as per target MAP greater than 65 mmHg or systolic blood pressure greater than 90 mmHg.      Hyponatremia- (present on admission)  Assessment & Plan  Acute on chronic, hypervolemic  Admitted with 117  Due to cirrhosis  Current sodium level is 123  Continue monitor sodium level closely  He has been getting hemodialysis.    Cirrhosis (HCC)- (present on admission)  Assessment & Plan  History of cirrhosis, MELD on admit 33  Etiology: Hereditary hemochromatosis versus EtOH  Status post TIPS  I am continuing norepinephrine  Ultrasound showed TIPS shunt patent  Panculture negative including para * 2 on ceftriaxone (SBP by wbc criteria)  Continue to monitor closely  Underwent ultrasound-guided paracentesis on June 23, 2024.  He completed a 7-day course of IV antibiotics still his white blood cell count elevated in the fluid.  Cultures remain negative to date.  I am holding further administration of antibiotic if his culture come back positive we will reinitiate antibiotic.  Currently his abdominal pain has significantly improved and he is afebrile.    Hereditary hemochromatosis (HCC)- (present on admission)  Assessment & Plan  Possible history of  Iron 62  Has required phlebotomy in the past however that was 6 to 7 years ago  Continue monitor CBC to evaluate for hemoglobin current hemoglobin is 9.3    Pancreatitis- (present on admission)  Assessment & Plan  History of possible gallstone pancreatitis  Pancreatitis  with levels 697 on admit  He denies complaint of severe abdominal pain.  Tolerating diet      I discussed plan of care during multidisciplinary rounds regarding patient's current medical condition and plan of  care.        Patient is critically ill.   The patient continues to have: Hypotension  The vital organ system that is affected is the: Cardiovascular system  If untreated there is a high chance of deterioration into: Cardiovascular collapse  And eventually death.   The critical care that I am providing today is: I am continuing and titrating Levophed as per target map of greater than 65 mmHg or systolic blood pressure of greater than 90 mmHg.  At the time of evaluation patient is requiring Levophed.  The critical that has been undertaken is medically complex.   There has been no overlap in critical care time.   Critical Care Time not including procedures: 37 minutes        VTE prophylaxis:    heparin ppx      I have performed a physical exam and reviewed and updated ROS and Plan today (6/24/2024). In review of yesterday's note (6/23/2024), there are no changes except as documented above.

## 2024-06-24 NOTE — PROGRESS NOTES
4 Eyes Skin Assessment Completed by SAMMY Mtz and SAMMY Iverson.    Head WDL  Ears Redness and Blanching  Nose WDL  Mouth WDL  Neck WDL  Breast/Chest WDL  Shoulder Blades WDL  Spine WDL  (R) Arm/Elbow/Hand Blanching, Bruising, Swelling, and Edema  (L) Arm/Elbow/Hand Blanching, Bruising, Abrasion, Scab, Swelling, and Edema  Abdomen Redness, Blanching, Bruising, and Incision, puncture site from paracentesis covered by abdominal pad and tape  Groin Redness, Blanching, and Rash  Scrotum/Coccyx/Buttocks Redness, Blanching, Excoriation, and Moisture Fissure, bruising  (R) Leg Redness, Blanching, Bruising, and Edema  (L) Leg Redness, Blanching, Bruising, and Edema  (R) Heel/Foot/Toe Redness, Blanching, and Edema  (L) Heel/Foot/Toe Redness, Blanching, and Edema          Devices In Places Tele Box, Blood Pressure Cuff, Pulse Ox, Vazquez, SCD's, and Nasal Cannula      Interventions In Place Gray Ear Foams, NC W/Ear Foams, Heel Mepilex, Sacral Mepilex, TAP System, Pillows, Q2 Turns, and Heels Loaded W/Pillows    Possible Skin Injury Yes    Pictures Uploaded Into Epic N/A  Wound Consult Placed N/A  RN Wound Prevention Protocol Ordered No

## 2024-06-24 NOTE — DISCHARGE PLANNING
Case Management Discharge Planning    Admission Date: 6/18/2024  GMLOS: 5.1  ALOS: 6    6-Clicks ADL Score: 16  6-Clicks Mobility Score: 14    Anticipated Discharge Dispo: Discharge Disposition: D/T to SNF with Medicare cert in anticipation of skilled care (03)    DME Needed: No    Action(s) Taken:     Pt discussed in IDT rounds. Pt is currently alert and oriented, on 2 L NC, on HD, and on a levo drip. Pt has a paracentesis 6/23 and plan is to wean off drip.     Escalations Completed: None    Medically Clear: No    Next Steps: RN CM to assist with DC needs

## 2024-06-25 NOTE — CARE PLAN
The patient is Stable - Low risk of patient condition declining or worsening    Shift Goals  Clinical Goals: SBP >90 MAP >65, pain control, free of falls  Patient Goals: Rest  Family Goals: Rest    Progress made toward(s) clinical / shift goals:  BP and MAP controlled by levophed, titration to maintain goal parameters, now on 0.07. Fall precautions in place, medicate per MAR for pain control.     Problem: Knowledge Deficit - Standard  Goal: Patient and family/care givers will demonstrate understanding of plan of care, disease process/condition, diagnostic tests and medications  Outcome: Progressing     Problem: Pain - Standard  Goal: Alleviation of pain or a reduction in pain to the patient’s comfort goal  Outcome: Progressing     Problem: Hemodynamics  Goal: Patient's hemodynamics, fluid balance and neurologic status will be stable or improve  Outcome: Progressing       Patient is not progressing towards the following goals:

## 2024-06-25 NOTE — PROGRESS NOTES
Hospital Medicine Daily Progress Note    Date of Service  6/25/2024    Chief Complaint      Hospital Course  Ryley Gaines is a 52yo male with cirrhosis (hemochromatosis), that was transferred from [facility] with acute kidney insult, hyponatremia and leukocytosis.  He was recently admitted discharged earlier this month with gallstone pancreatitis and required ERCP and sphincterotomy.      Now on this admit he presents for generalized malaise, nausea, vomiting, inability to take oral fluids, decreased urine output and increased swelling. During workup at [facility] he was found to have a leukocytosis with a white count of 18.8, mild anemia with a hemoglobin of 11.4, normal platelet count at 228.  Metabolic panel showed moderate hyponatremia at 119, hyperkalemia at 5.4 (he received insulin, dextrose and calcium) his creatinine was also noted to be markedly elevated at 10.41 up from 2.83 six days ago.  He underwent a paracentesis which showed a WBC count of 5750 and a RBC count of 8700.  He was initiated on Rocephin, Flagyl and norepinephrine.  He was given IV fluids and albumin and transferred to our facility for higher level of care.  Here at [facility] he had a paracentesis 6/23 with ascites fluids showing elevated wbc and she was placed on ceftriaxone. Nephrology has consulted and he was started on hemodialysis 6/23.    Interval Problem Update  6/25: Alert and oriented.  Remains with swelling of bilateral legs.  No abdominal pain.  Speech clear and appropriate. Weaning levophed as able.    Crtically ill in icu require pressor support with Levophed. I am actively titrating norepinephrine as BP allows.  Crtical Care time:33 minutes.    I have discussed this patient's plan of care and discharge plan at IDT rounds today with Case Management, Nursing, Nursing leadership, and other members of the IDT team.    Consultants/Specialty  nephrology    Code Status  Full Code    Disposition  The patient is not  medically cleared for discharge to home or a post-acute facility.  Anticipate discharge to: home with close outpatient follow-up    I have placed the appropriate orders for post-discharge needs.    Review of Systems  Review of Systems   Constitutional:  Positive for malaise/fatigue.   Respiratory:  Negative for shortness of breath.    Cardiovascular:  Positive for leg swelling.   Gastrointestinal:  Negative for abdominal pain.   Genitourinary:  Negative for dysuria.   Neurological:  Negative for dizziness.   Psychiatric/Behavioral:  The patient is not nervous/anxious.         Physical Exam  Temp:  [35.9 °C (96.7 °F)-36.6 °C (97.9 °F)] 36.4 °C (97.6 °F)  Pulse:  [] 88  Resp:  [8-58] 20  BP: ()/(43-79) 106/57  SpO2:  [78 %-98 %] 92 %    Physical Exam  Vitals reviewed.   Constitutional:       Appearance: Normal appearance. He is not diaphoretic.   HENT:      Head: Normocephalic and atraumatic.      Nose: Nose normal.      Mouth/Throat:      Mouth: Mucous membranes are moist.      Pharynx: No oropharyngeal exudate.   Eyes:      General: No scleral icterus.        Right eye: No discharge.         Left eye: No discharge.      Extraocular Movements: Extraocular movements intact.      Conjunctiva/sclera: Conjunctivae normal.   Cardiovascular:      Rate and Rhythm: Normal rate and regular rhythm.      Pulses:           Radial pulses are 2+ on the right side and 2+ on the left side.        Dorsalis pedis pulses are 2+ on the right side and 2+ on the left side.      Heart sounds: No murmur heard.  Pulmonary:      Effort: Pulmonary effort is normal. No respiratory distress.      Breath sounds: Normal breath sounds. No wheezing or rales.   Abdominal:      General: Bowel sounds are decreased. There is distension.      Palpations: Abdomen is soft.      Tenderness: There is no abdominal tenderness.   Musculoskeletal:         General: No swelling or tenderness.      Cervical back: Normal range of motion and neck supple.  No muscular tenderness.      Right lower leg: Edema present.      Left lower leg: Edema present.   Skin:     Coloration: Skin is not jaundiced or pale.   Neurological:      General: No focal deficit present.      Mental Status: He is alert and oriented to person, place, and time. Mental status is at baseline.      Cranial Nerves: No cranial nerve deficit.   Psychiatric:         Mood and Affect: Mood normal.         Behavior: Behavior normal.         Fluids    Intake/Output Summary (Last 24 hours) at 6/25/2024 1025  Last data filed at 6/25/2024 0607  Gross per 24 hour   Intake 1337.56 ml   Output 2370 ml   Net -1032.44 ml       Laboratory  Recent Labs     06/23/24  0406 06/24/24  0440 06/25/24  0549   WBC 14.9* 14.6* 17.5*   RBC 2.91* 2.78* 2.96*   HEMOGLOBIN 9.4* 9.3* 9.7*   HEMATOCRIT 27.1* 26.3* 27.8*   MCV 93.1 94.6 93.9   MCH 32.3 33.5* 32.8   MCHC 34.7 35.4 34.9   RDW 53.9* 55.3* 55.8*   PLATELETCT 77* 61* 60*   MPV 11.6 11.6 11.2     Recent Labs     06/23/24  2204 06/24/24  0440 06/25/24  0549   SODIUM 123* 123* 128*   POTASSIUM 4.2 4.2 4.0   CHLORIDE 86* 86* 93*   CO2 20 20 21   GLUCOSE 97 106* 117*   BUN 68* 71* 48*   CREATININE 5.84* 5.96* 3.67*   CALCIUM 8.7 8.8 9.1                     Imaging  US-PARACENTESIS, ABD WITH IMAGING   Final Result      1. Ultrasound-guided diagnostic and therapeutic paracentesis of the right lower quadrant of the abdominal wall.      2. 5,250 mL of fluid withdrawn.      EC-ECHOCARDIOGRAM COMPLETE W/O CONT   Final Result      IR-LIN,GROSHONG PLACEMENT >5   Final Result      1. Ultrasound and fluoroscopic guided placement of a right internal jugular 14.5 Hebrew HemoSplit tunneled dialysis catheter.      2. The hemodialysis catheter may be used immediately as clinically indicated. Flushes per protocol.         US-LIVER AND VESSELS LTD (TIPS) (COMBO)   Final Result      1. Patent TIPS shunt.   2. Cirrhotic morphology of the liver and ascites.   3. Sludge and stones in the  gallbladder lumen. Gallbladder wall thickening is secondary to adjacent ascites.      OUTSIDE IMAGES-CT ABDOMEN /PELVIS   Final Result           Assessment/Plan  * Acute renal failure (ARF) (HCC)- (present on admission)  Assessment & Plan  Normal creatinine when discharged on 6/8/2024, up to 9.7 on admit  6/25 Cr:3.67 <5.96  S/p Bicarb gtt.  Nephrology consulting and initiated hemodialysis as of 6/23  Titrating norepinephrine and he has been receiving albumin.  6/22/24 tunneled dialysis catheter placement   He has been getting dialysis that was started on June 22, 2024.  6/25  I am actively titrating Levophed as per target MAP of greater than 65 mmHg or systolic blood pressure of greater than 90 mmHg.  Monitor labs, I/O's, and vitals.      Septic shock (HCC)- (present on admission)  Assessment & Plan  6/18: wbc 20, levophed 0.22 mcgs  6/19: wbc 14.7, levophed and vaopressin  6/20 Now is requiring Levophed to maintain blood pressure.  Continue ceftriaxone.  6/21 I am continuing IV ceftriaxone, IV albumin and he still requiring Levophed to maintain blood pressure..  6/22/24 ongoing hypertension.   6/23/24 he remains on Levophed.  6/24/24 he remains on Levophed.    6/25 I am trying to titrate levophed.  He is on midodrine.  WBC: uptoday:17.5 <14.6.  Afebrile.  Off antibiotics      Hyponatremia- (present on admission)  Assessment & Plan  Acute on chronic, hypervolemic  Admitted with Na:117  6/25 Na:128 <123  Due to cirrhosis  Continue monitor sodium level closely  He has been getting hemodialysis.    Cirrhosis (HCC)- (present on admission)  Assessment & Plan  History of cirrhosis, MELD on admit 33  Etiology: Hereditary hemochromatosis versus EtOH  Status post TIPS  Ultrasound showed a patent TIPS shunt  Panculture negative including para * 2 on ceftriaxone (SBP by wbc criteria)  Continue to monitor closely  Underwent ultrasound-guided paracentesis on June 23, 2024.  He completed a 7-day course of IV antibiotics still  his white blood cell count elevated in the fluid.  Cultures remain negative to date.  I am holding further administration of antibiotic if his culture come back positive we will reinitiate antibiotic.  Currently his abdominal pain has significantly improved and he is afebrile.    Hereditary hemochromatosis (HCC)- (present on admission)  Assessment & Plan  Possible history of  Iron 62  Has required phlebotomy in the past however that was 6 to 7 years ago  Continue monitor CBC to evaluate for hemoglobin current hemoglobin is 9.3    Pancreatitis- (present on admission)  Assessment & Plan  History of possible gallstone pancreatitis  Pancreatitis  with levels 697 on admit  He denies complaint of severe abdominal pain.  Had ERCP and sphincterotomy last admit early 6/2024  Tolerating diet         VTE prophylaxis:    heparin ppx      I have performed a physical exam and reviewed and updated ROS and Plan today (6/25/2024). In review of yesterday's note (6/24/2024), there are no changes except as documented above.

## 2024-06-25 NOTE — CARE PLAN
The patient is Watcher - Medium risk of patient condition declining or worsening    Shift Goals  Clinical Goals: SBP>90 MAP>65, pain control, safety  Patient Goals: feel better  Family Goals: rest    Progress made toward(s) clinical / shift goals:  BP goal maintained with levophed, pain controlled, no safety events overnight    Patient is not progressing towards the following goals: NA      Problem: Pain - Standard  Goal: Alleviation of pain or a reduction in pain to the patient’s comfort goal  Outcome: Progressing  Note: Pain managed well with oxycodone 5mg      Problem: Fall Risk  Goal: Patient will remain free from falls  Outcome: Progressing  Note: High fall risk, does not use call light appropriately, bed alarm armed, non skid socks on, call light within reach     Problem: Hemodynamics  Goal: Patient's hemodynamics, fluid balance and neurologic status will be stable or improve  Outcome: Progressing  Note: Levophed titrated from 0.09 to 0.07 mcg/kg/min     Problem: Urinary - Renal Perfusion  Goal: Ability to achieve and maintain adequate renal perfusion and functioning will improve  Outcome: Progressing  Note: Voided 500 mL total urine overnight, dark rosales in color

## 2024-06-26 PROBLEM — A41.9 SEPSIS (HCC): Status: ACTIVE | Noted: 2024-01-01

## 2024-06-26 PROBLEM — D72.829 LEUKOCYTOSIS: Status: ACTIVE | Noted: 2024-01-01

## 2024-06-26 NOTE — DISCHARGE PLANNING
Case Management Discharge Planning    Admission Date: 6/18/2024  GMLOS: 5.1  ALOS: 8    6-Clicks ADL Score: 16  6-Clicks Mobility Score: 14      Anticipated Discharge Dispo: Discharge Disposition: D/T to SNF with Medicare cert in anticipation of skilled care (03)    DME Needed: No    Action(s) Taken:   Patient dicussed in IDT rounds. Patient is currently alert and oriented. On 2L NC, on HD. Levo drip has been d/cd.     Escalations Completed: None    Medically Clear: No    Next Steps: RN CM to assist with DC needs.    Barriers to Discharge: Medical clearance    Is the patient up for discharge tomorrow: No

## 2024-06-26 NOTE — PROGRESS NOTES
4 Eyes Skin Assessment Completed by SAMMY Glez and SAMMY Villarreal.    Head WDL  Ears WDL  Nose WDL  Mouth WDL  Neck Incision  Breast/Chest WDL  Shoulder Blades WDL  Spine WDL  (R) Arm/Elbow/Hand Blanching and Bruising  (L) Arm/Elbow/Hand Bruising and Abrasion  Abdomen Incision  Groin WDL  Scrotum/Coccyx/Buttocks Redness, Blanchable  (R) Leg Edema  (L) Leg Edema  (R) Heel/Foot/Toe Blanching  (L) Heel/Foot/Toe Blanching          Devices In Places Pulse Ox, Vazquez, and SCD's      Interventions In Place TAP System, Pillows, and Q2 Turns    Possible Skin Injury Yes    Pictures Uploaded Into Epic Yes  Wound Consult Placed Yes  RN Wound Prevention Protocol Ordered Yes

## 2024-06-26 NOTE — CARE PLAN
The patient is Watcher - Medium risk of patient condition declining or worsening    Shift Goals  Clinical Goals: SBP>90 Map>65, pain control  Patient Goals: rest  Family Goals: rest    Progress made toward(s) clinical / shift goals:    Problem: Knowledge Deficit - Standard  Goal: Patient and family/care givers will demonstrate understanding of plan of care, disease process/condition, diagnostic tests and medications  Outcome: Progressing     Problem: Pain - Standard  Goal: Alleviation of pain or a reduction in pain to the patient’s comfort goal  Outcome: Progressing     Problem: Hemodynamics  Goal: Patient's hemodynamics, fluid balance and neurologic status will be stable or improve  Outcome: Progressing     Problem: Fluid Volume  Goal: Fluid volume balance will be maintained  Outcome: Progressing     Problem: Urinary - Renal Perfusion  Goal: Ability to achieve and maintain adequate renal perfusion and functioning will improve  Outcome: Progressing     Problem: Respiratory  Goal: Patient will achieve/maintain optimum respiratory ventilation and gas exchange  Outcome: Progressing     Problem: Skin Integrity  Goal: Skin integrity is maintained or improved  Outcome: Progressing     Problem: Fall Risk  Goal: Patient will remain free from falls  Outcome: Progressing       Patient is not progressing towards the following goals:

## 2024-06-26 NOTE — PROGRESS NOTES
4 Eyes Skin Assessment Completed by SAMMY Mtz and Andre JORDAN RN.    Head WDL  Ears Redness and Blanching  Nose WDL  Mouth WDL  Neck WDL  Breast/Chest WDL  Shoulder Blades WDL  Spine WDL  (R) Arm/Elbow/Hand Redness, Bruising, and Edema  (L) Arm/Elbow/Hand Redness, Bruising, and Weeping  Abdomen Redness, Blanching, Bruising, and Incision, puncture site from para, wheeping abdomin left side covered by abdominal pad.   Groin Redness, Blanching, and Rash  Scrotum/Coccyx/Buttocks Redness, Blanching, Excoriation, and Moisture Fissure, bruising  (R) Leg Redness, Blanching, Bruising, and Edema  (L) Leg Redness, Blanching, Bruising, and Edema  (R) Heel/Foot/Toe Redness, Blanching, and Edema  (L) Heel/Foot/Toe Redness, Blanching, and Edema          Devices In Places Tele Box, Blood Pressure Cuff, Pulse Ox, Vazquez, and SCD's      Interventions In Place Heel Mepilex, Sacral Mepilex, TAP System, Pillows, Elbow Mepilex, Q2 Turns, and Low Air Loss Mattress    Possible Skin Injury No    Pictures Uploaded Into Epic N/A  Wound Consult Placed N/A  RN Wound Prevention Protocol Ordered No

## 2024-06-26 NOTE — PROGRESS NOTES
[facility] Services Progress Note     Hemodialysis treatment x3 hours completed as ordered per Dr West.   Treatment performed at bedside started at 0900 and ended at 1200.      Net UF Removed: 1000 mL     Patient tolerated treatment well. UF goal reached as tolerated.  R-side chest tunneled CVC access with good patency and flow x 2  Patient stable during and post treatment.   See Acute HD flow sheets on clinical data notes under media for details.      Post tx access: R-side chest tunneled HD catheter flushed with saline then locked with heparin 1000 units/ml per designated amount in each lumen (see MAR) then clamped, capped aseptically and labeled properly. CVC dressings clean, dry and intact. No s/s of infection to HD catheter site. Heparin lock to be aspirated prior to next dialysis/CVC use by dialysis RN only. Please do not flush or draw from ports.     Please notify Nephrologist/Dialysis for follow-up.     Report given to primary care nurse .

## 2024-06-26 NOTE — PROGRESS NOTES
Hospital Medicine Daily Progress Note    Date of Service  6/26/2024    Chief Complaint      Hospital Course  Ryley Gaines is a 52yo male with cirrhosis (hemochromatosis), that was transferred from [facility] with acute kidney insult, hyponatremia and leukocytosis.  He was recently admitted discharged earlier this month with gallstone pancreatitis and required ERCP and sphincterotomy.      Now on this admit he presents for generalized malaise, nausea, vomiting, inability to take oral fluids, decreased urine output and increased swelling. During workup at [facility] he was found to have a leukocytosis with a white count of 18.8, mild anemia with a hemoglobin of 11.4, normal platelet count at 228.  Metabolic panel showed moderate hyponatremia at 119, hyperkalemia at 5.4 (he received insulin, dextrose and calcium) his creatinine was also noted to be markedly elevated at 10.41 up from 2.83 six days ago.  He underwent a paracentesis which showed a WBC count of 5750 and a RBC count of 8700.  He was initiated on Rocephin, Flagyl and norepinephrine.  He was given IV fluids and albumin and transferred to our facility for higher level of care.  Here at [facility] he had a paracentesis 6/23 with ascites fluids showing elevated wbc and she was placed on ceftriaxone. Nephrology has consulted and he was started on hemodialysis 6/23.    Interval Problem Update  6/26: Having dialysis.  Has been off pressor support and tolerated hemodialysis. Alert and oriented.      6/25: Alert and oriented.  Remains with swelling of bilateral legs.  No abdominal pain.  Speech clear and appropriate. Weaning levophed as able.      I have discussed this patient's plan of care and discharge plan at IDT rounds today with Case Management, Nursing, Nursing leadership, and other members of the IDT team.    Consultants/Specialty  nephrology    Code Status  Full Code    Disposition  The patient is not medically cleared for discharge to  home or a post-acute facility.  Anticipate discharge to: home with close outpatient follow-up    I have placed the appropriate orders for post-discharge needs.    Review of Systems  Review of Systems   Constitutional:  Negative for fever.   Respiratory:  Negative for shortness of breath.    Cardiovascular:  Positive for leg swelling.   Gastrointestinal:  Negative for abdominal pain.   Genitourinary:  Negative for dysuria.   Neurological:  Negative for dizziness and speech change.   Psychiatric/Behavioral:  The patient is not nervous/anxious.         Physical Exam  Temp:  [36.1 °C (96.9 °F)-37 °C (98.6 °F)] 36.9 °C (98.4 °F)  Pulse:  [] 102  Resp:  [8-64] 19  BP: ()/(48-62) 117/56  SpO2:  [88 %-98 %] 98 %    Physical Exam  Vitals reviewed.   Constitutional:       Appearance: Normal appearance. He is obese. He is not diaphoretic.   HENT:      Head: Normocephalic and atraumatic.      Nose: Nose normal.      Mouth/Throat:      Mouth: Mucous membranes are moist.      Pharynx: No oropharyngeal exudate.   Eyes:      General: No scleral icterus.        Right eye: No discharge.         Left eye: No discharge.      Extraocular Movements: Extraocular movements intact.      Conjunctiva/sclera: Conjunctivae normal.   Cardiovascular:      Rate and Rhythm: Normal rate and regular rhythm.      Pulses:           Radial pulses are 2+ on the right side and 2+ on the left side.        Dorsalis pedis pulses are 2+ on the right side and 2+ on the left side.      Heart sounds: No murmur heard.  Pulmonary:      Effort: No respiratory distress.      Breath sounds: Normal breath sounds. No wheezing or rales.   Abdominal:      General: Bowel sounds are decreased. There is distension.      Palpations: Abdomen is soft.      Tenderness: There is no abdominal tenderness.   Musculoskeletal:         General: No swelling or tenderness.      Cervical back: Normal range of motion. No muscular tenderness.      Right lower leg: Edema  present.      Left lower leg: Edema present.   Skin:     Coloration: Skin is not jaundiced or pale.   Neurological:      General: No focal deficit present.      Mental Status: He is alert and oriented to person, place, and time. Mental status is at baseline.      Cranial Nerves: No cranial nerve deficit.   Psychiatric:         Mood and Affect: Mood normal.         Behavior: Behavior normal.         Fluids    Intake/Output Summary (Last 24 hours) at 6/26/2024 1850  Last data filed at 6/26/2024 1800  Gross per 24 hour   Intake 1810 ml   Output 1900 ml   Net -90 ml       Laboratory  Recent Labs     06/24/24  0440 06/25/24  0549 06/26/24  0108   WBC 14.6* 17.5* 17.5*   RBC 2.78* 2.96* 3.01*   HEMOGLOBIN 9.3* 9.7* 10.0*   HEMATOCRIT 26.3* 27.8* 27.8*   MCV 94.6 93.9 92.4   MCH 33.5* 32.8 33.2*   MCHC 35.4 34.9 36.0   RDW 55.3* 55.8* 55.5*   PLATELETCT 61* 60* 59*   MPV 11.6 11.2 11.2     Recent Labs     06/24/24  0440 06/25/24  0549 06/26/24  0108   SODIUM 123* 128* 127*   POTASSIUM 4.2 4.0 4.2   CHLORIDE 86* 93* 90*   CO2 20 21 20   GLUCOSE 106* 117* 103*   BUN 71* 48* 56*   CREATININE 5.96* 3.67* 3.97*   CALCIUM 8.8 9.1 9.0                     Imaging  US-PARACENTESIS, ABD WITH IMAGING   Final Result      1. Ultrasound-guided diagnostic and therapeutic paracentesis of the right lower quadrant of the abdominal wall.      2. 5,250 mL of fluid withdrawn.      EC-ECHOCARDIOGRAM COMPLETE W/O CONT   Final Result      IR-LIN,GROSHONG PLACEMENT >5   Final Result      1. Ultrasound and fluoroscopic guided placement of a right internal jugular 14.5 Portuguese HemoSplit tunneled dialysis catheter.      2. The hemodialysis catheter may be used immediately as clinically indicated. Flushes per protocol.         US-LIVER AND VESSELS LTD (TIPS) (COMBO)   Final Result      1. Patent TIPS shunt.   2. Cirrhotic morphology of the liver and ascites.   3. Sludge and stones in the gallbladder lumen. Gallbladder wall thickening is secondary  to adjacent ascites.      OUTSIDE IMAGES-CT ABDOMEN /PELVIS   Final Result           Assessment/Plan  * Acute renal failure (ARF) (HCC)- (present on admission)  Assessment & Plan  Normal creatinine when discharged on 6/8/2024, up to 9.7 on admit  6/25 Cr:3.97 <3.67 <5.96  S/p Bicarb gtt.  Nephrology consulting and initiated hemodialysis as of 6/23  Titrating norepinephrine and he has been receiving albumin.  6/22/24 tunneled dialysis catheter placement   He has been getting dialysis that was started on June 22, 2024.  6/26 off levophed. Monitor I/O's, MAP, vitals and lab      Leukocytosis  Assessment & Plan  6/26 wbc:17.5 <17.5<14.6  Monitor cbc    Septic shock (HCC)- (present on admission)  Assessment & Plan  6/18: wbc 20, levophed 0.22 mcgs  6/19: wbc 14.7, levophed and vaopressin  6/20 Now is requiring Levophed to maintain blood pressure.  Continue ceftriaxone.  6/21 I am continuing IV ceftriaxone, IV albumin and he still requiring Levophed to maintain blood pressure..  6/22/24 ongoing hypertension.   6/23/24 he remains on Levophed.  6/24/24 he remains on Levophed.    6/25 I am trying to titrate levophed.  He is on midodrine.  WBC: uptoday:17.5 <14.6.  Afebrile.  Off antibiotics  6/26 Off levophed.  Transfer to medical floor.  Titrate midodrine now as BP allows. Expect lower BP given cirrhosis.      Hyponatremia- (present on admission)  Assessment & Plan  Acute on chronic, hypervolemic  Admitted with Na:117  6/26 Na:127<128 <123  Due to cirrhosis  Continue monitor sodium level closely  He has been getting hemodialysis.    Cirrhosis (HCC)- (present on admission)  Assessment & Plan  History of cirrhosis, MELD on admit 33  Etiology: Hereditary hemochromatosis versus EtOH  Status post TIPS  Ultrasound showed a patent TIPS shunt  Panculture negative including para * 2 on ceftriaxone (SBP by wbc criteria)  Continue to monitor closely  Underwent ultrasound-guided paracentesis on June 23, 2024.  He completed a 7-day  course of IV antibiotics still his white blood cell count elevated in the fluid.  Cultures remain negative to date.  I am holding further administration of antibiotic if his culture come back positive we will reinitiate antibiotic.  Currently his abdominal pain has significantly improved and he is afebrile.    Hereditary hemochromatosis (HCC)- (present on admission)  Assessment & Plan  Possible history of  Iron 62  Has required phlebotomy in the past however that was 6 to 7 years ago  Continue monitor CBC to evaluate for hemoglobin current hemoglobin is 9.3    Pancreatitis- (present on admission)  Assessment & Plan  History of possible gallstone pancreatitis  Pancreatitis  with levels 697 on admit  He denies complaint of severe abdominal pain.  Had ERCP and sphincterotomy last admit early 6/2024  Tolerating diet         VTE prophylaxis:    heparin ppx      I have performed a physical exam and reviewed and updated ROS and Plan today (6/26/2024). In review of yesterday's note (6/25/2024), there are no changes except as documented above.

## 2024-06-26 NOTE — DIETARY
"Nutrition services: Day 8 of admit.  Ryley Gaines is a 53 y.o. male with admitting DX of acute renal failure.   Consult received for poor po.    RD able to visit pt at bed side. Pt slow to respond, seemed to be in discomfort. Stated he was eating normally prior to admission but intake has dropped over the past few days. Per chart review pt has been eating <25% for most meals. Lunch tray at bed side untouched. RD asked if there was anything he wanted removed/added to trays, pt declined. RD offered pt protein supplement, pt accepted. Pt unable to state UBW. Per chart review pt with insignificant wt loss.     Assessment:  Height: 190.5 cm (6' 3\")  Weight: 117 kg (257 lb 15 oz)  Body mass index is 32.24 kg/m²., BMI classification: Obese Class I  Diet/Intake: Renal with 1000 ml fluid restriction    Wt Readings from Last 5 Encounters:   06/26/24 117 kg (257 lb 15 oz)   06/01/24 120 kg (264 lb 12.4 oz)     Evaluation:   52yo male with cirrhosis (hemochromatosis), that was transferred from [facility] with acute kidney insult, hyponatremia and leukocytosis.  He was recently admitted discharged earlier this month with gallstone pancreatitis and required ERCP and sphincterotomy.    Labs: Na 127, Cl 90, Glu 103, Bun 56, Creatinine 3.97, GFR 17  Meds reviewed.   Skin: multiple wounds documented, no pressure related injuries at this time. 3+ pitting edema in the LLE & RLE. 2+ pitting/weeping edema in the abdominal region. 1+ edema in the LUE, RUE, flank and scrotal region.   BM 6/25    Malnutrition Risk: Pt at risk with poor po. Multiple criteria not identified at this time.     Recommendations/Plan:  Nepro TID.    Encourage intake of >50%.  Document intake of all PO as % taken in ADL's to provide interdisciplinary communication across all shifts.   Monitor weight.  Nutrition rep will continue to see patient for ongoing meal and snack preferences.     RD following.   "

## 2024-06-27 NOTE — PROGRESS NOTES
Pt. Transferred to [facility] at 1528, transported via wheelchair, two belongings bags with patient including phone and glasses and cell phone  and patient clothes and shoes. Report called to SAMMY Mueller.

## 2024-06-27 NOTE — PROGRESS NOTES
[facility] -  PROGRESS NOTE               Author: aPrish West M.D. Date & Time: 6/27/2024  5:28 AM     HPI:  53 y.o. male with h/o HTN, hereditary hemochromatosis, cirrhosis complicated by ascites requiring TIPS, who presented to outside hospital with c/o generalized weakness, nausea, vomiting, decreased p.o. intake as well as  decreased urine output for the past several days.  He was found to be hypotensive 60 mmHg SBP on arrival .   Initial labs noted for Na 119, K 5.4 bicarb 13 and elevated Cr. Pt had diagnostic paracentetics with evidence of SBP.   Pt treated with 2 L of crystalloids, albumin 50 g and IV ceftriaxone and Flagyl.  He was transferred to [facility] for further evaluation and continuation of care.  Lab work upon arrival to [facility] showed WBC 21, S Na 117 ( 6/18 2.30 ) improved to 119 on AM labs, K 5.4, CO2 12, BUN 85, Cr 8.8, total bilirubin 2.9, lipase 697, urine sodium less than 20, UA moderate bilirubin positive nitrite, 3-5 WBCs 0-2 RBC and few bacteria  Blood pressure has since improved 90s to 100 systolic, he is on room air.  No urine output documented so far.  Patient is currently receiving albumin 25 g every 6 hours, and on norepinephrine .  Of note he was recently hospitalized for  gallstone pancreatitis status post ERCP and sphincterotomy, hospital course complicated by BACILIO in the setting of IV contrast and pancreatitis. He was seen by nephrology at th time.  Cr has  increased to 2.1 from 0.8 2/1.Cr has improved to baseline 0.8 6/8.   Pt has hx of chronic hyponatremia with serum sodium 130-134 during last admission   S Na . Pt was discharged on Lasix, spirolactone, lisinopril. Other pertinent home meds gabapentin and Trazodone.   Pt is alert and oriented, but  slow to answer questions. He has been taking motrin daily for abdominal pain following recent hospital admission. Pt says he has not used any ETOH at least for the past year or so.   No  "F/C/CP/SOB.  No melena, hematochezia, hematemesis.  No HA, visual changes.    DAILY NEPHROLOGY SUMMARY:  6/18: consult done   6/19: No acute events, more alert today, answering questions appropriately,  cc/ 24 hrs, received lasix 100 mg earlier today, seem to be responding to IV lasix,  cc s/p lasix. Increase abdominal distention, wife at bed side, on vasopressor and NE, on room air, plan for paracentesis today   6/20: no acute events, UOP 3375 cc/24 hrs, on clears,on low dose NE,s/p paracentesis yesterday 6 L removed, wife at bed side, S Na improved to 123 appropriate rise, Cr improved to 7.62, per RN UOP slowing down 60 cc/ hr since am  appetite still poor.   6/21: Pt says he his feeling nauseous, still poor appetite, urine out put dropped 875 cc/24 hrs, on lasix gtt 5 mg/ hr, on pressors NE  6/22: UOP has dropped 215 cc over the past 24 hrs, pt reports abdominal discomfort and distention, low BP  systolic  6/23: HD yesterday #1 net  cc, pt seen on HD today, 205 cc UOP /24 hrs, remains on low dose levophed, no complaints besides abdominal pain , pending paracentesis   6/24: Patient seen on hemodialysis today, tolerated 1 L UF, remains on low-dose Levophed to maintain MAP, answer simple questions but remains confused  6/25: More alert and awake, on low-dose vasopressors, HD yesterday with 1 L UF  6/26: No acute events overnight, seen on hemodialysis, able to wean off vasopressors, tolerated 1 L UF  6/27; HD yesterday with 1 L UF, complains of abdominal distention, some difficulty breathing    REVIEW OF SYSTEMS:    10 point ROS reviewed and is as per HPI/daily summary or otherwise negative    PMH/PSH/SH/FH:  Reviewed and unchanged since admission note    CURRENT MEDICATIONS:  Reviewed from admission to present day    VS:  /76   Pulse (!) 58   Temp 36.8 °C (98.3 °F) (Temporal)   Resp 12   Ht 1.905 m (6' 3\")   Wt 123 kg (270 lb 15.1 oz)   SpO2 92%   BMI 33.87 kg/m²   Physical " Exam  Constitutional:       General: He is not in acute distress.     Appearance: He is ill-appearing.   HENT:      Head: Normocephalic.      Mouth/Throat:      Mouth: Mucous membranes are moist.   Eyes:      General: Scleral icterus present.   Cardiovascular:      Rate and Rhythm: Normal rate.   Pulmonary:      Effort: Pulmonary effort is normal.   Abdominal:      General: There is distension.   Musculoskeletal:         General: Swelling present.      Comments: RIJ tunneled Cath    Skin:     General: Skin is warm.   Neurological:      Mental Status: He is oriented to person, place, and time.   Psychiatric:         Mood and Affect: Mood normal.         Fluids:    Intake/Output Summary (Last 24 hours) at 6/27/2024 0528  Last data filed at 6/27/2024 0100  Gross per 24 hour   Intake 1920 ml   Output 1980 ml   Net -60 ml       LABS:  Labs reviewed, pertinent labs below.    Recent Labs     06/25/24  0549 06/26/24  0108 06/27/24  0410   WBC 17.5* 17.5* 25.7*   RBC 2.96* 3.01* 3.14*   HEMOGLOBIN 9.7* 10.0* 10.5*   HEMATOCRIT 27.8* 27.8* 30.1*   MCV 93.9 92.4 95.9   MCH 32.8 33.2* 33.4*   MCHC 34.9 36.0 34.9   RDW 55.8* 55.5* 59.1*   PLATELETCT 60* 59* 62*   MPV 11.2 11.2 10.9       Recent Labs     06/25/24  0549 06/26/24  0108 06/27/24  0410   SODIUM 128* 127* 129*   POTASSIUM 4.0 4.2 4.6   CHLORIDE 93* 90* 92*   CO2 21 20 20   GLUCOSE 117* 103* 99   BUN 48* 56* 44*   CREATININE 3.67* 3.97* 3.55*   CALCIUM 9.1 9.0 8.8        IMAGING:  All imaging reviewed from admission to present day      IMPRESSION:  # BACILIO oliguric   - Suspect sec to  ischemic ATN in the setting of unstable hemodynamics, decrease EABV, septic shock, SBP, NSAIDs, and bile cast nephropathy   - On initial presentation there was low suspicion for HRS given sepsis picture, some improvement  in renal function early in the course but worsened again,suspect HRS, abdominal compartment syndrome from tense ascites   - No evidence of obstruction on CT from outside  hospital   - Recent Hx of BACILIO, Cr improved to 0.8 6/8   - HD started 6/22 via RIJ PC  # Cirrhosis, MELD 33   - Possible etiologies hereditary hemochromatosis versus history of alcoholism   - ? Liver transplant candidacy per previous evaluation    - No recent ETOH use for about a year   # Ascites   # Hyponatremia   - Suspect sec to cirrhosis, decreased EABV altering ADH secretion   - Also gabapentin and trazodone can alter ADH secretion and may have contributed , on hold   # Septic shock likely sec to SBP, leukocytosis resolved    # Metabolic acidosis   # SBP   # Hypervolemia, total body volume overload with decrease EABV  # Thrombocytopenia   # Anemia   - No need of BARBARA in BACILIO   # Low serum albumin   # Hyperphosphatemia, hypocalcemia         SUGGESTIONS:  -HD yesterday and will repeat again today for more UF   Cont HD q TTS and PRN in between as needed   - Continue p.o. midodrine 15  mg tid   - Off antibiotics now   - Avoid nephrotoxins   - Adjust meds for renal function   - Limit fluid intake 1200 cc/d   - Continue Ca acetate 1334  mg tid ac for phosphorus binding  - Transfuse PRBC PRN     Prognosis remains guarded, will likely continue to need ongoing dialysis  ===============  Update: 1:58 PM Pt refused HD today, will plan for repeat HD again tomorrow

## 2024-06-27 NOTE — CARE PLAN
Problem: Knowledge Deficit - Standard  Goal: Patient and family/care givers will demonstrate understanding of plan of care, disease process/condition, diagnostic tests and medications  Outcome: Progressing     Problem: Pain - Standard  Goal: Alleviation of pain or a reduction in pain to the patient’s comfort goal  Outcome: Progressing     Problem: Hemodynamics  Goal: Patient's hemodynamics, fluid balance and neurologic status will be stable or improve  Outcome: Progressing     Problem: Fluid Volume  Goal: Fluid volume balance will be maintained  Outcome: Progressing     Problem: Urinary - Renal Perfusion  Goal: Ability to achieve and maintain adequate renal perfusion and functioning will improve  Outcome: Progressing     Problem: Respiratory  Goal: Patient will achieve/maintain optimum respiratory ventilation and gas exchange  Outcome: Progressing     Problem: Mechanical Ventilation  Goal: Safe management of artificial airway and ventilation  Outcome: Progressing  Goal: Successful weaning off mechanical ventilator, spontaneously maintains adequate gas exchange  Outcome: Progressing  Goal: Patient will be able to express needs and understand communication  Outcome: Progressing     Problem: Physical Regulation  Goal: Diagnostic test results will improve  Outcome: Progressing  Goal: Signs and symptoms of infection will decrease  Outcome: Progressing     Problem: Skin Integrity  Goal: Skin integrity is maintained or improved  Outcome: Progressing     Problem: Fall Risk  Goal: Patient will remain free from falls  Outcome: Progressing   The patient is Stable - Low risk of patient condition declining or worsening    Shift Goals  Clinical Goals: hemodynamic stability, MAP>65, pain control  Patient Goals: rest, pain control  Family Goals: rest    Progress made toward(s) clinical / shift goals:  yes

## 2024-06-27 NOTE — PROGRESS NOTES
Hospital Medicine Daily Progress Note    Date of Service  6/27/2024    Chief Complaint  General malaise with nausea vomiting    Hospital Course  Ryley Gaines is a 54yo male with cirrhosis (hemochromatosis), that was transferred from [facility] with acute kidney insult, hyponatremia and leukocytosis.  He was recently admitted discharged earlier this month with gallstone pancreatitis and required ERCP and sphincterotomy.      Now on this admit he presents for generalized malaise, nausea, vomiting, inability to take oral fluids, decreased urine output and increased swelling. During workup at [facility] he was found to have a leukocytosis with a white count of 18.8, mild anemia with a hemoglobin of 11.4, normal platelet count at 228.  Metabolic panel showed moderate hyponatremia at 119, hyperkalemia at 5.4 (he received insulin, dextrose and calcium) his creatinine was also noted to be markedly elevated at 10.41 up from 2.83 six days ago.  He underwent a paracentesis which showed a WBC count of 5750 and a RBC count of 8700.  He was initiated on Rocephin, Flagyl and norepinephrine.  He was given IV fluids and albumin and transferred to our facility for higher level of care.  Here at [facility] he had a paracentesis 6/23 with ascites fluids showing elevated wbc and she was placed on ceftriaxone. Nephrology has consulted and he was started on hemodialysis 6/23.    Interval Problem Update  6/27: WBC 25.7    6/26: Having dialysis.  Has been off pressor support and tolerated hemodialysis. Alert and oriented.      6/25: Alert and oriented.  Remains with swelling of bilateral legs.  No abdominal pain.  Speech clear and appropriate. Weaning levophed as able.      I have discussed this patient's plan of care and discharge plan at IDT rounds today with Case Management, Nursing, Nursing leadership, and other members of the IDT team.    Consultants/Specialty  nephrology    Code Status  Full Code    Disposition  The  patient is not medically cleared for discharge to home or a post-acute facility.      I have placed the appropriate orders for post-discharge needs.    Review of Systems  Review of Systems   Constitutional:  Positive for malaise/fatigue. Negative for fever.   Respiratory:  Negative for shortness of breath.    Cardiovascular:  Positive for leg swelling.   Gastrointestinal:  Negative for abdominal pain and nausea.   Musculoskeletal:  Negative for back pain and neck pain.   Neurological:  Negative for dizziness and speech change.   Psychiatric/Behavioral:  The patient is not nervous/anxious.         Physical Exam  Temp:  [36.2 °C (97.2 °F)-36.9 °C (98.4 °F)] 36.2 °C (97.2 °F)  Pulse:  [] 93  Resp:  [12-20] 18  BP: (101-112)/(50-76) 107/56  SpO2:  [91 %-96 %] 96 %    Physical Exam  Vitals reviewed.   Constitutional:       Appearance: He is obese. He is ill-appearing. He is not diaphoretic.   HENT:      Head: Normocephalic and atraumatic.      Nose: Nose normal.      Mouth/Throat:      Mouth: Mucous membranes are moist.      Pharynx: No oropharyngeal exudate.   Eyes:      General: No scleral icterus.        Right eye: No discharge.         Left eye: No discharge.      Extraocular Movements: Extraocular movements intact.      Conjunctiva/sclera: Conjunctivae normal.   Cardiovascular:      Rate and Rhythm: Normal rate and regular rhythm.      Pulses:           Radial pulses are 2+ on the right side and 2+ on the left side.        Dorsalis pedis pulses are 2+ on the right side and 2+ on the left side.      Heart sounds: No murmur heard.  Pulmonary:      Effort: No respiratory distress.      Breath sounds: Normal breath sounds. No wheezing or rales.   Abdominal:      General: Bowel sounds are decreased. There is distension.      Palpations: Abdomen is soft.      Tenderness: There is no abdominal tenderness.   Musculoskeletal:         General: No swelling or tenderness.      Cervical back: Normal range of motion. No  muscular tenderness.      Right lower leg: Edema present.      Left lower leg: Edema present.   Lymphadenopathy:      Cervical: No cervical adenopathy.   Skin:     Coloration: Skin is not jaundiced or pale.   Neurological:      General: No focal deficit present.      Mental Status: He is alert and oriented to person, place, and time. Mental status is at baseline.      Cranial Nerves: No cranial nerve deficit.   Psychiatric:         Mood and Affect: Mood normal.         Behavior: Behavior normal.         Fluids    Intake/Output Summary (Last 24 hours) at 6/27/2024 2122  Last data filed at 6/27/2024 1639  Gross per 24 hour   Intake 800 ml   Output 80 ml   Net 720 ml       Laboratory  Recent Labs     06/25/24  0549 06/26/24  0108 06/27/24  0410   WBC 17.5* 17.5* 25.7*   RBC 2.96* 3.01* 3.14*   HEMOGLOBIN 9.7* 10.0* 10.5*   HEMATOCRIT 27.8* 27.8* 30.1*   MCV 93.9 92.4 95.9   MCH 32.8 33.2* 33.4*   MCHC 34.9 36.0 34.9   RDW 55.8* 55.5* 59.1*   PLATELETCT 60* 59* 62*   MPV 11.2 11.2 10.9     Recent Labs     06/25/24  0549 06/26/24  0108 06/27/24  0410   SODIUM 128* 127* 129*   POTASSIUM 4.0 4.2 4.6   CHLORIDE 93* 90* 92*   CO2 21 20 20   GLUCOSE 117* 103* 99   BUN 48* 56* 44*   CREATININE 3.67* 3.97* 3.55*   CALCIUM 9.1 9.0 8.8                     Imaging  US-PARACENTESIS, ABD WITH IMAGING   Final Result      1. Ultrasound-guided diagnostic and therapeutic paracentesis of the right lower quadrant of the abdominal wall.      2. 5,250 mL of fluid withdrawn.      EC-ECHOCARDIOGRAM COMPLETE W/O CONT   Final Result      IR-LIN,GROSHONG PLACEMENT >5   Final Result      1. Ultrasound and fluoroscopic guided placement of a right internal jugular 14.5 [place] HemoSplit tunneled dialysis catheter.      2. The hemodialysis catheter may be used immediately as clinically indicated. Flushes per protocol.         US-LIVER AND VESSELS LTD (TIPS) (COMBO)   Final Result      1. Patent TIPS shunt.   2. Cirrhotic morphology of the liver  and ascites.   3. Sludge and stones in the gallbladder lumen. Gallbladder wall thickening is secondary to adjacent ascites.      OUTSIDE IMAGES-CT ABDOMEN /PELVIS   Final Result           Assessment/Plan  * Acute renal failure (ARF) (HCC)- (present on admission)  Assessment & Plan  Normal creatinine when discharged on 6/8/2024, up to 9.7 on admit  6/27 Cr:3.55 <3.97 <3.67 <5.96  S/p Bicarb gtt.  Nephrology consulting and initiated hemodialysis as of 6/23 6/22/24 tunneled dialysis catheter placement   He has been getting dialysis that was started on June 22, 2024.  6/26 off levophed. Monitor I/O's, MAP, vitals and lab      Leukocytosis  Assessment & Plan  6/27 wbc:25.7 <17.5 <17.5<14.6  Monitor cbc  Repeat ascites evaluation did not reflex infection  S/p antibiotics.  No acute source of antibiotics.    Septic shock (HCC)- (present on admission)  Assessment & Plan  6/18: wbc 20, levophed 0.22 mcgs  6/19: wbc 14.7, levophed and vaopressin  6/20 Now is requiring Levophed to maintain blood pressure.  Continue ceftriaxone.  6/21 I am continuing IV ceftriaxone, IV albumin and he still requiring Levophed to maintain blood pressure..  6/22/24 ongoing hypertension.   6/23/24 he remains on Levophed.  6/24/24 he remains on Levophed.    6/25 I am trying to titrate levophed.  He is on midodrine.  WBC: uptoday:17.5 <14.6.  Afebrile.  Off antibiotics  6/26 Off levophed.  Transfer to medical floor.  Titrate midodrine now as BP allows. Expect lower BP given cirrhosis.      Hyponatremia- (present on admission)  Assessment & Plan  Acute on chronic, hypervolemic  Admitted with Na:117  6/27 Na:129 <127<128 <123  Due to cirrhosis  Continue monitor sodium level closely  He has been getting hemodialysis.    Cirrhosis (HCC)- (present on admission)  Assessment & Plan  History of cirrhosis, MELD on admit 33  Etiology: Hereditary hemochromatosis versus EtOH  Status post TIPS  Ultrasound showed a patent TIPS shunt  Panculture negative including  para * 2 on s/p ceftriaxone (SBP by wbc criteria)  Continue to monitor closely  Underwent ultrasound-guided paracentesis on June 23, 2024.  He completed a 7-day course of IV antibiotics still his white blood cell count elevated in the fluid.  Cultures remain negative to date.      Hereditary hemochromatosis (HCC)- (present on admission)  Assessment & Plan  Possible history of  Iron 62  Has required phlebotomy in the past however that was 6 to 7 years ago  Continue monitor CBC to evaluate for hemoglobin current hemoglobin is 9.3    Pancreatitis- (present on admission)  Assessment & Plan  History of possible gallstone pancreatitis  Pancreatitis  with levels 697 on admit  He denies complaint of severe abdominal pain.  Had ERCP and sphincterotomy last admit early 6/2024  Tolerating diet         VTE prophylaxis:   SCDs/TEDs      I have performed a physical exam and reviewed and updated ROS and Plan today (6/27/2024). In review of yesterday's note (6/26/2024), there are no changes except as documented above.

## 2024-06-27 NOTE — PROGRESS NOTES
[facility] Progress Note:  Late entry:  Pt A/Ox3, refused HD tx today despite education, Dr West and primary RN notified.

## 2024-06-27 NOTE — CARE PLAN
The patient is Stable - Low risk of patient condition declining or worsening    Shift Goals  Clinical Goals: hemodynamic stability, transfer to med surg  Patient Goals: rest pain control  Family Goals: daryl    Progress made toward(s) clinical / shift goals:    Problem: Knowledge Deficit - Standard  Goal: Patient and family/care givers will demonstrate understanding of plan of care, disease process/condition, diagnostic tests and medications  Outcome: Progressing     Problem: Pain - Standard  Goal: Alleviation of pain or a reduction in pain to the patient’s comfort goal  Outcome: Progressing     Problem: Hemodynamics  Goal: Patient's hemodynamics, fluid balance and neurologic status will be stable or improve  Outcome: Progressing     Problem: Physical Regulation  Goal: Diagnostic test results will improve  Outcome: Progressing     Problem: Skin Integrity  Goal: Skin integrity is maintained or improved  Outcome: Progressing     Problem: Fall Risk  Goal: Patient will remain free from falls  Outcome: Progressing       Patient is not progressing towards the following goals:      Problem: Urinary - Renal Perfusion  Goal: Ability to achieve and maintain adequate renal perfusion and functioning will improve  Outcome: Not Met

## 2024-06-27 NOTE — CARE PLAN
Problem: Knowledge Deficit - Standard  Goal: Patient and family/care givers will demonstrate understanding of plan of care, disease process/condition, diagnostic tests and medications  Outcome: Progressing     Problem: Pain - Standard  Goal: Alleviation of pain or a reduction in pain to the patient’s comfort goal  Outcome: Progressing     Problem: Hemodynamics  Goal: Patient's hemodynamics, fluid balance and neurologic status will be stable or improve  Outcome: Progressing     Problem: Fluid Volume  Goal: Fluid volume balance will be maintained  Outcome: Progressing     Problem: Urinary - Renal Perfusion  Goal: Ability to achieve and maintain adequate renal perfusion and functioning will improve  Outcome: Progressing     Problem: Respiratory  Goal: Patient will achieve/maintain optimum respiratory ventilation and gas exchange  Outcome: Progressing     Problem: Mechanical Ventilation  Goal: Safe management of artificial airway and ventilation  Outcome: Progressing  Goal: Successful weaning off mechanical ventilator, spontaneously maintains adequate gas exchange  Outcome: Progressing  Goal: Patient will be able to express needs and understand communication  Outcome: Progressing     Problem: Physical Regulation  Goal: Diagnostic test results will improve  Outcome: Progressing  Goal: Signs and symptoms of infection will decrease  Outcome: Progressing     Problem: Skin Integrity  Goal: Skin integrity is maintained or improved  Outcome: Progressing     Problem: Fall Risk  Goal: Patient will remain free from falls  Outcome: Progressing   The patient is Stable - Low risk of patient condition declining or worsening    Shift Goals  Clinical Goals: hemodynamic stability, transfer to [facility]  Patient Goals: rest/pain control  Family Goals: daryl    Progress made toward(s) clinical / shift goals:  yes, plan to transfer to med surg when bed available

## 2024-06-27 NOTE — PROGRESS NOTES
[facility] -  PROGRESS NOTE               Author: Parish West M.D. Date & Time: 6/27/2024  5:27 AM     HPI:  53 y.o. male with h/o HTN, hereditary hemochromatosis, cirrhosis complicated by ascites requiring TIPS, who presented to outside hospital with c/o generalized weakness, nausea, vomiting, decreased p.o. intake as well as  decreased urine output for the past several days.  He was found to be hypotensive 60 mmHg SBP on arrival .   Initial labs noted for Na 119, K 5.4 bicarb 13 and elevated Cr. Pt had diagnostic paracentetics with evidence of SBP.   Pt treated with 2 L of crystalloids, albumin 50 g and IV ceftriaxone and Flagyl.  He was transferred to [facility] for further evaluation and continuation of care.  Lab work upon arrival to [facility] showed WBC 21, S Na 117 ( 6/18 2.30 ) improved to 119 on AM labs, K 5.4, CO2 12, BUN 85, Cr 8.8, total bilirubin 2.9, lipase 697, urine sodium less than 20, UA moderate bilirubin positive nitrite, 3-5 WBCs 0-2 RBC and few bacteria  Blood pressure has since improved 90s to 100 systolic, he is on room air.  No urine output documented so far.  Patient is currently receiving albumin 25 g every 6 hours, and on norepinephrine .  Of note he was recently hospitalized for  gallstone pancreatitis status post ERCP and sphincterotomy, hospital course complicated by BACILIO in the setting of IV contrast and pancreatitis. He was seen by nephrology at th time.  Cr has  increased to 2.1 from 0.8 2/1.Cr has improved to baseline 0.8 6/8.   Pt has hx of chronic hyponatremia with serum sodium 130-134 during last admission   S Na . Pt was discharged on Lasix, spirolactone, lisinopril. Other pertinent home meds gabapentin and Trazodone.   Pt is alert and oriented, but  slow to answer questions. He has been taking motrin daily for abdominal pain following recent hospital admission. Pt says he has not used any ETOH at least for the past year or so.   No  "F/C/CP/SOB.  No melena, hematochezia, hematemesis.  No HA, visual changes.    DAILY NEPHROLOGY SUMMARY:  6/18: consult done   6/19: No acute events, more alert today, answering questions appropriately,  cc/ 24 hrs, received lasix 100 mg earlier today, seem to be responding to IV lasix,  cc s/p lasix. Increase abdominal distention, wife at bed side, on vasopressor and NE, on room air, plan for paracentesis today   6/20: no acute events, UOP 3375 cc/24 hrs, on clears,on low dose NE,s/p paracentesis yesterday 6 L removed, wife at bed side, S Na improved to 123 appropriate rise, Cr improved to 7.62, per RN UOP slowing down 60 cc/ hr since am  appetite still poor.   6/21: Pt says he his feeling nauseous, still poor appetite, urine out put dropped 875 cc/24 hrs, on lasix gtt 5 mg/ hr, on pressors NE  6/22: UOP has dropped 215 cc over the past 24 hrs, pt reports abdominal discomfort and distention, low BP  systolic  6/23: HD yesterday #1 net  cc, pt seen on HD today, 205 cc UOP /24 hrs, remains on low dose levophed, no complaints besides abdominal pain , pending paracentesis   6/24: Patient seen on hemodialysis today, tolerated 1 L UF, remains on low-dose Levophed to maintain MAP, answer simple questions but remains confused  6/25: More alert and awake, on low-dose vasopressors, HD yesterday with 1 L UF  6/26: No acute events overnight, seen on hemodialysis, able to wean off vasopressors, tolerated 1 L UF    REVIEW OF SYSTEMS:    10 point ROS reviewed and is as per HPI/daily summary or otherwise negative    PMH/PSH/SH/FH:  Reviewed and unchanged since admission note    CURRENT MEDICATIONS:  Reviewed from admission to present day    VS:  /76   Pulse (!) 58   Temp 36.8 °C (98.3 °F) (Temporal)   Resp 12   Ht 1.905 m (6' 3\")   Wt 123 kg (270 lb 15.1 oz)   SpO2 92%   BMI 33.87 kg/m²   Physical Exam  Constitutional:       General: He is not in acute distress.     Appearance: He is " ill-appearing.   HENT:      Head: Normocephalic.      Mouth/Throat:      Mouth: Mucous membranes are moist.   Eyes:      General: Scleral icterus present.   Cardiovascular:      Rate and Rhythm: Normal rate.   Pulmonary:      Effort: Pulmonary effort is normal.   Abdominal:      General: There is distension.   Musculoskeletal:         General: Swelling present.      Comments: RIJ tunneled Cath    Skin:     General: Skin is warm.   Neurological:      Mental Status: He is oriented to person, place, and time.   Psychiatric:         Mood and Affect: Mood normal.         Fluids:    Intake/Output Summary (Last 24 hours) at 6/27/2024 0527  Last data filed at 6/27/2024 0100  Gross per 24 hour   Intake 1920 ml   Output 1980 ml   Net -60 ml       LABS:  Labs reviewed, pertinent labs below.    Recent Labs     06/25/24  0549 06/26/24  0108 06/27/24  0410   WBC 17.5* 17.5* 25.7*   RBC 2.96* 3.01* 3.14*   HEMOGLOBIN 9.7* 10.0* 10.5*   HEMATOCRIT 27.8* 27.8* 30.1*   MCV 93.9 92.4 95.9   MCH 32.8 33.2* 33.4*   MCHC 34.9 36.0 34.9   RDW 55.8* 55.5* 59.1*   PLATELETCT 60* 59* 62*   MPV 11.2 11.2 10.9       Recent Labs     06/25/24  0549 06/26/24  0108 06/27/24  0410   SODIUM 128* 127* 129*   POTASSIUM 4.0 4.2 4.6   CHLORIDE 93* 90* 92*   CO2 21 20 20   GLUCOSE 117* 103* 99   BUN 48* 56* 44*   CREATININE 3.67* 3.97* 3.55*   CALCIUM 9.1 9.0 8.8        IMAGING:  All imaging reviewed from admission to present day      IMPRESSION:  # BACILIO oliguric   - Suspect sec to  ischemic ATN in the setting of unstable hemodynamics, decrease EABV, septic shock, SBP, NSAIDs, and bile cast nephropathy   - On initial presentation there was low suspicion for HRS given sepsis picture, some improvement  in renal function early in the course but worsened again,suspect HRS, abdominal compartment syndrome from tense ascites   - No evidence of obstruction on CT from outside hospital   - Recent Hx of BACILIO, Cr improved to 0.8 6/8   - HD started 6/22 via RIJ PC  #  Cirrhosis, MELD 33   - Possible etiologies hereditary hemochromatosis versus history of alcoholism   - ? Liver transplant candidacy per previous evaluation    - No recent ETOH use for about a year   # Ascites   # Hyponatremia   - Suspect sec to cirrhosis, decreased EABV altering ADH secretion   - Also gabapentin and trazodone can alter ADH secretion and may have contributed , on hold   # Septic shock likely sec to SBP, leukocytosis resolved    # Metabolic acidosis   # SBP   # Hypervolemia, total body volume overload with decrease EABV  # Thrombocytopenia   # Anemia   - No need of BARBARA in BACILIO   # Low serum albumin   # Hyperphosphatemia, hypocalcemia         SUGGESTIONS:  -Repeat HD today, evaluate for repeat HD again tomorrow  - Continue p.o. midodrine 15  mg tid   -Further antibiotics per primary svc   - Avoid nephrotoxins   - Adjust meds for renal function   - Holding  gabapentin and trazodone and lieu of renal failure  - Limit fluid intake 1200 cc/d   -Continue Ca acetate 1334  mg tid ac for phosphorus binding  - Transfuse PRBC PRN     Prognosis remains guarded, will likely continue to need ongoing dialysis

## 2024-06-27 NOTE — PROGRESS NOTES
[facility] -  PROGRESS NOTE               Author: Parish West M.D. Date & Time: 6/27/2024  5:24 AM     HPI:  53 y.o. male with h/o HTN, hereditary hemochromatosis, cirrhosis complicated by ascites requiring TIPS, who presented to outside hospital with c/o generalized weakness, nausea, vomiting, decreased p.o. intake as well as  decreased urine output for the past several days.  He was found to be hypotensive 60 mmHg SBP on arrival .   Initial labs noted for Na 119, K 5.4 bicarb 13 and elevated Cr. Pt had diagnostic paracentetics with evidence of SBP.   Pt treated with 2 L of crystalloids, albumin 50 g and IV ceftriaxone and Flagyl.  He was transferred to [facility] for further evaluation and continuation of care.  Lab work upon arrival to [facility] showed WBC 21, S Na 117 ( 6/18 2.30 ) improved to 119 on AM labs, K 5.4, CO2 12, BUN 85, Cr 8.8, total bilirubin 2.9, lipase 697, urine sodium less than 20, UA moderate bilirubin positive nitrite, 3-5 WBCs 0-2 RBC and few bacteria  Blood pressure has since improved 90s to 100 systolic, he is on room air.  No urine output documented so far.  Patient is currently receiving albumin 25 g every 6 hours, and on norepinephrine .  Of note he was recently hospitalized for  gallstone pancreatitis status post ERCP and sphincterotomy, hospital course complicated by BACILIO in the setting of IV contrast and pancreatitis. He was seen by nephrology at th time.  Cr has  increased to 2.1 from 0.8 2/1.Cr has improved to baseline 0.8 6/8.   Pt has hx of chronic hyponatremia with serum sodium 130-134 during last admission   S Na . Pt was discharged on Lasix, spirolactone, lisinopril. Other pertinent home meds gabapentin and Trazodone.   Pt is alert and oriented, but  slow to answer questions. He has been taking motrin daily for abdominal pain following recent hospital admission. Pt says he has not used any ETOH at least for the past year or so.   No  "F/C/CP/SOB.  No melena, hematochezia, hematemesis.  No HA, visual changes.    DAILY NEPHROLOGY SUMMARY:  6/18: consult done   6/19: No acute events, more alert today, answering questions appropriately,  cc/ 24 hrs, received lasix 100 mg earlier today, seem to be responding to IV lasix,  cc s/p lasix. Increase abdominal distention, wife at bed side, on vasopressor and NE, on room air, plan for paracentesis today   6/20: no acute events, UOP 3375 cc/24 hrs, on clears,on low dose NE,s/p paracentesis yesterday 6 L removed, wife at bed side, S Na improved to 123 appropriate rise, Cr improved to 7.62, per RN UOP slowing down 60 cc/ hr since am  appetite still poor.   6/21: Pt says he his feeling nauseous, still poor appetite, urine out put dropped 875 cc/24 hrs, on lasix gtt 5 mg/ hr, on pressors NE  6/22: UOP has dropped 215 cc over the past 24 hrs, pt reports abdominal discomfort and distention, low BP  systolic  6/23: HD yesterday #1 net  cc, pt seen on HD today, 205 cc UOP /24 hrs, remains on low dose levophed, no complaints besides abdominal pain , pending paracentesis   6/24: Patient seen on hemodialysis today, tolerated 1 L UF, remains on low-dose Levophed to maintain MAP, answer simple questions but remains confused  6/25: More alert and awake, on low-dose vasopressors, HD yesterday with 1 L UF    REVIEW OF SYSTEMS:    10 point ROS reviewed and is as per HPI/daily summary or otherwise negative    PMH/PSH/SH/FH:  Reviewed and unchanged since admission note    CURRENT MEDICATIONS:  Reviewed from admission to present day    VS:  /76   Pulse (!) 58   Temp 36.8 °C (98.3 °F) (Temporal)   Resp 12   Ht 1.905 m (6' 3\")   Wt 123 kg (270 lb 15.1 oz)   SpO2 92%   BMI 33.87 kg/m²   Physical Exam  Constitutional:       General: He is not in acute distress.     Appearance: He is ill-appearing.   HENT:      Head: Normocephalic.      Mouth/Throat:      Mouth: Mucous membranes are moist. "   Eyes:      General: Scleral icterus present.   Cardiovascular:      Rate and Rhythm: Normal rate.   Pulmonary:      Effort: Pulmonary effort is normal.   Abdominal:      General: There is distension.   Musculoskeletal:         General: Swelling present.      Comments: RIJ tunneled Cath    Skin:     General: Skin is warm.   Neurological:      Mental Status: He is oriented to person, place, and time.   Psychiatric:         Mood and Affect: Mood normal.         Fluids:    Intake/Output Summary (Last 24 hours) at 6/27/2024 0524  Last data filed at 6/27/2024 0100  Gross per 24 hour   Intake 1920 ml   Output 1980 ml   Net -60 ml       LABS:  Labs reviewed, pertinent labs below.    Recent Labs     06/25/24  0549 06/26/24  0108 06/27/24  0410   WBC 17.5* 17.5* 25.7*   RBC 2.96* 3.01* 3.14*   HEMOGLOBIN 9.7* 10.0* 10.5*   HEMATOCRIT 27.8* 27.8* 30.1*   MCV 93.9 92.4 95.9   MCH 32.8 33.2* 33.4*   MCHC 34.9 36.0 34.9   RDW 55.8* 55.5* 59.1*   PLATELETCT 60* 59* 62*   MPV 11.2 11.2 10.9       Recent Labs     06/25/24  0549 06/26/24  0108 06/27/24  0410   SODIUM 128* 127* 129*   POTASSIUM 4.0 4.2 4.6   CHLORIDE 93* 90* 92*   CO2 21 20 20   GLUCOSE 117* 103* 99   BUN 48* 56* 44*   CREATININE 3.67* 3.97* 3.55*   CALCIUM 9.1 9.0 8.8        IMAGING:  All imaging reviewed from admission to present day      IMPRESSION:  # BACILIO oliguric   - Suspect sec to  ischemic ATN in the setting of unstable hemodynamics, decrease EABV, septic shock, SBP, NSAIDs, and bile cast nephropathy   - On initial presentation there was low suspicion for HRS given sepsis picture, some improvement  in renal function early in the course but worsened again,suspect HRS, abdominal compartment syndrome from tense ascites   - No evidence of obstruction on CT from outside hospital   - Recent Hx of BACILIO, Cr improved to 0.8 6/8   - HD started 6/22 via RIJ PC  # Cirrhosis, MELD 33   - Possible etiologies hereditary hemochromatosis versus history of alcoholism   - ?  Liver transplant candidacy per previous evaluation    - No recent ETOH use for about a year   # Ascites   # Hyponatremia   - Suspect sec to cirrhosis, decreased EABV altering ADH secretion   - Also gabapentin and trazodone can alter ADH secretion and may have contributed , on hold   # Septic shock likely sec to SBP, leukocytosis resolved    # Metabolic acidosis   # SBP   # Hypervolemia, total body volume overload with decrease EABV  # Thrombocytopenia   # Anemia   - No need of BARBARA in BACILIO   # Low serum albumin   # Hyperphosphatemia, hypocalcemia         SUGGESTIONS:  -Completed HD 3 out of 3 yesterday, no need for repeat HD today, will evaluate for repeat HD tomorrow  -  Continue vasopressors  PRN to maintain MAP > 65 mmHg  -Continue p.o. midodrine 15  mg tid   -Further antibiotics per primary svc   - Avoid nephrotoxins   - Adjust meds for renal function   - Holding  gabapentin and trazodone and lieu of renal failure  - Limit fluid intake 1200 cc/d   -Continue Ca acetate 1334  mg tid ac for phosphorus binding  - Transfuse PRBC PRN     Prognosis remains guarded, will likely continue to need ongoing dialysis

## 2024-06-28 PROBLEM — K86.9 PANCREATIC LESION: Status: ACTIVE | Noted: 2024-01-01

## 2024-06-28 PROBLEM — D68.9 COAGULOPATHY (HCC): Status: ACTIVE | Noted: 2024-01-01

## 2024-06-28 PROBLEM — K80.20 CHOLELITHIASIS: Status: ACTIVE | Noted: 2024-01-01

## 2024-06-28 PROBLEM — K31.9 GASTRIC LESION: Status: ACTIVE | Noted: 2024-01-01

## 2024-06-28 PROBLEM — D63.8 ANEMIA, CHRONIC DISEASE: Status: ACTIVE | Noted: 2024-01-01

## 2024-06-28 PROBLEM — R18.8 CIRRHOSIS OF LIVER WITH ASCITES (HCC): Status: ACTIVE | Noted: 2024-01-01

## 2024-06-28 NOTE — PROGRESS NOTES
4 Eyes Skin Assessment Completed by SAMMY Mueller and SAMMY Ivy.    Head WDL  Ears Redness and Blanching  Nose WDL  Mouth WDL  Neck WDL  Breast/Chest WDL  Shoulder Blades WDL  Spine WDL  (R) Arm/Elbow/Hand Redness, Bruising, and Edema  (L) Arm/Elbow/Hand Redness, Bruising, and Weeping  Abdomen Redness, Blanching, Bruising, and Incision, puncture site, weeping left side of abdomen  Groin Redness, Blanching, and Rash  Scrotum/Coccyx/Buttocks Redness, Blanching, Excoriation, and Moisture Fissure, bruising  (R) Leg Redness, Blanching, Bruising, and Edema  (L) Leg Redness, Blanching, Bruising, and Edema  (R) Heel/Foot/Toe Redness, Blanching, and Edema  (L) Heel/Foot/Toe Redness, Blanching, and Edema      Interventions In Place Heel Mepilex, Sacral Mepilex, Pillows, Elbow Mepilex, and Q2 Turns    Possible Skin Injury No    Pictures Uploaded Into Epic Yes  Wound Consult Placed N/A  RN Wound Prevention Protocol Ordered No

## 2024-06-28 NOTE — PROGRESS NOTES
Hospital Medicine Daily Progress Note    Date of Service  6/28/2024    Chief Complaint  Ryley Gaines is a 53 y.o. male admitted 6/18/2024 with generalized weakness    Hospital Course  Admitted as a transfer for generalized weakness, nausea, vomiting, abdominal pain, edema.  He was noted in outside facility to have acute kidney injury with a creatinine of 10, hyponatremia of 119, hyperkalemia 5.4.  He has known history of liver cirrhosis with ascites. He also had a recent admission at [facility] for alcohol versus gallstone pancreatitis, ERCP with sphincterotomy was done on 6/4/2024.  He also developed acute kidney injury then but his creatinine did improve.  Patient was a poor surgical candidate for cholecystectomy for the cholelithiasis and this was not performed. Paracentesis was done which showed evidence of probable spontaneous bacterial peritonitis, and he was started on IV Rocephin and Flagyl.    He was requiring pressor support and then was transferred to [facility].    He was admitted by critical care to the ICU, he was continued on pressor support.  Nephrology was consulted on the case.  His kidney function failed to improve, tunneled hemodialysis catheter was placed on 6/22/2024, and hemodialysis was initiated.  Has finished the course of IV antibiotics, however his leukocytosis has continued to trend up, as well as his LFTs.    Interval Problem Update  BACILIO - crea 4.9  Pancreatitis - lipase 690 on admission  Leukocytosis - [id number]  Cirrhosis -LFTs trending up, on review of records, [facility] determined etiology was alcohol, PETH positive on 7/2023 and was then lost to follow-up  Hyponatremia - 127    I have discussed this patient's plan of care and discharge plan at IDT rounds today with Case Management, Nursing, Nursing leadership, and other members of the IDT team.    Consultants/Specialty  critical care, nephrology, and palliative care    Code Status  Full Code    Disposition  The patient is not  medically cleared for discharge to home or a post-acute facility.  Anticipate discharge to: skilled nursing facility    I have placed the appropriate orders for post-discharge needs.    Review of Systems  Review of Systems   Constitutional:  Positive for malaise/fatigue. Negative for chills, diaphoresis and fever.   HENT:  Negative for congestion, hearing loss and sore throat.    Eyes:  Negative for blurred vision.   Respiratory:  Negative for cough, shortness of breath and wheezing.    Cardiovascular:  Positive for leg swelling. Negative for chest pain and palpitations.   Gastrointestinal:  Positive for abdominal pain. Negative for diarrhea, heartburn, nausea and vomiting.   Genitourinary:  Negative for dysuria, flank pain and hematuria.   Musculoskeletal:  Negative for back pain, joint pain, myalgias and neck pain.   Skin:  Negative for rash.   Neurological:  Positive for weakness. Negative for dizziness, sensory change, speech change, focal weakness and headaches.   Psychiatric/Behavioral:  The patient is nervous/anxious.         Physical Exam  Temp:  [36.1 °C (97 °F)-36.4 °C (97.5 °F)] 36.1 °C (97 °F)  Pulse:  [] 105  Resp:  [18-20] 20  BP: ()/(50-58) 99/58  SpO2:  [94 %-97 %] 97 %    Physical Exam  Vitals and nursing note reviewed.   Constitutional:       Appearance: He is obese. He is ill-appearing.   HENT:      Head: Normocephalic and atraumatic.      Nose: No congestion.      Mouth/Throat:      Mouth: Mucous membranes are dry.   Eyes:      General: Scleral icterus present.      Extraocular Movements: Extraocular movements intact.      Conjunctiva/sclera: Conjunctivae normal.   Cardiovascular:      Rate and Rhythm: Regular rhythm. Tachycardia present.   Pulmonary:      Effort: Pulmonary effort is normal.      Breath sounds: Decreased air movement present.   Abdominal:      General: There is distension.      Tenderness: There is abdominal tenderness. There is no guarding or rebound.    Musculoskeletal:         General: Swelling (both upper extremities) present.      Cervical back: No tenderness.      Right lower leg: Edema present.      Left lower leg: Edema present.   Skin:     Coloration: Skin is jaundiced.      Findings: Bruising present.   Neurological:      General: No focal deficit present.      Mental Status: He is alert and oriented to person, place, and time.      Cranial Nerves: No cranial nerve deficit.   Psychiatric:         Speech: Speech is delayed.         Cognition and Memory: He exhibits impaired recent memory.         Fluids    Intake/Output Summary (Last 24 hours) at 6/28/2024 1240  Last data filed at 6/28/2024 0713  Gross per 24 hour   Intake 500 ml   Output --   Net 500 ml       Laboratory  Recent Labs     06/26/24  0108 06/27/24  0410 06/28/24  1027   WBC 17.5* 25.7* 41.3*   RBC 3.01* 3.14* 3.21*   HEMOGLOBIN 10.0* 10.5* 10.8*   HEMATOCRIT 27.8* 30.1* 30.6*   MCV 92.4 95.9 95.3   MCH 33.2* 33.4* 33.6*   MCHC 36.0 34.9 35.3   RDW 55.5* 59.1* 58.1*   PLATELETCT 59* 62* 91*   MPV 11.2 10.9 12.0     Recent Labs     06/26/24  0108 06/27/24  0410 06/28/24  1027   SODIUM 127* 129* 127*   POTASSIUM 4.2 4.6 5.0   CHLORIDE 90* 92* 91*   CO2 20 20 18*   GLUCOSE 103* 99 128*   BUN 56* 44* 61*   CREATININE 3.97* 3.55* 4.93*   CALCIUM 9.0 8.8 9.4                   Imaging  DX-CHEST-LIMITED (1 VIEW)   Final Result      1.  RIGHT neck catheter tip projects over the RIGHT ventricle, suggest retracting   2.  Bibasilar underinflation atelectasis, less likely pneumonia      US-PARACENTESIS, ABD WITH IMAGING   Final Result      1. Ultrasound-guided diagnostic and therapeutic paracentesis of the right lower quadrant of the abdominal wall.      2. 5,250 mL of fluid withdrawn.      EC-ECHOCARDIOGRAM COMPLETE W/O CONT   Final Result      IR-LIN,GROSHONG PLACEMENT >5   Final Result      1. Ultrasound and fluoroscopic guided placement of a right internal jugular 14.5 Divehi HemoSplit tunneled  dialysis catheter.      2. The hemodialysis catheter may be used immediately as clinically indicated. Flushes per protocol.         US-LIVER AND VESSELS LTD (TIPS) (COMBO)   Final Result      1. Patent TIPS shunt.   2. Cirrhotic morphology of the liver and ascites.   3. Sludge and stones in the gallbladder lumen. Gallbladder wall thickening is secondary to adjacent ascites.      OUTSIDE IMAGES-CT ABDOMEN /PELVIS   Final Result      US-EXTREMITY VENOUS LOWER BILAT    (Results Pending)   US-EXTREMITY VENOUS UPPER BILAT    (Results Pending)   TO-WNIGFZR-F/O    (Results Pending)        Assessment/Plan  * BACILIO (acute kidney injury) (HCC)- (present on admission)  Assessment & Plan  Tunneled Hemodialysis catheter placement 6/22  HD per Nephrology  Consult Palliative care - very poor prognosis      Cholelithiasis- (present on admission)  Assessment & Plan  Check MRCP  Start IV Zosyn    Septic shock (HCC)- (present on admission)  Assessment & Plan  Secondary to SBP?      Cirrhosis of liver with ascites (HCC)- (present on admission)  Assessment & Plan  MELD 34  6/18  65% 90 day mortality  Etiology: Alcohol per [facility], + PETH 7/2023, failure to follow up since   TIPS  Ultrasound-guided paracenteses 5.2 L removed 6/23/2024  Lactulose, Rifaximin  Hold Lasix and Aldactone due to BACILIO  Follow cmp, INR, ammonia  Consult Palliative care - very poor prognosis  Unlikely candidate for Transplant    Pancreatitis- (present on admission)  Assessment & Plan  ERCP with sphincterotomy 6/4/2024  Alcohol versus gallstone  Check MRCP  Start IV Zosyn  Pain control    Anemia, chronic disease- (present on admission)  Assessment & Plan  Follow cbc    Coagulopathy (HCC)- (present on admission)  Assessment & Plan  Follow INR    Gastric lesion- (present on admission)  Assessment & Plan  Will need follow up    Pancreatic lesion- (present on admission)  Assessment & Plan  Check MRCP  Check CEA, AFP, CA 19-9    Leukocytosis- (present on  admission)  Assessment & Plan  Cholangitis?  Start IV Zosyn  Check MRCP, UA, CXR  Check blood cultures  Check venous duplex    Hyponatremia- (present on admission)  Assessment & Plan  Follow cmp    Thrombocytopenia (HCC)- (present on admission)  Assessment & Plan  Follow cbc    Hereditary hemochromatosis (HCC)- (present on admission)  Assessment & Plan  History of?         VTE prophylaxis:   SCDs/TEDs      I have performed a physical exam and reviewed and updated ROS and Plan today (6/28/2024). In review of yesterday's note (6/27/2024), there are no changes except as documented above.

## 2024-06-28 NOTE — CARE PLAN
Problem: Skin Integrity  Goal: Skin integrity is maintained or improved  Outcome: Progressing     Problem: Fall Risk  Goal: Patient will remain free from falls  Outcome: Progressing     The patient is Stable - Low risk of patient condition declining or worsening    Shift Goals  Clinical Goals: up for meals, monitor BP,  hemodynamic stability  Patient Goals: rest and pain control  Family Goals: daryl    Progress made toward(s) clinical / shift goals:  patient educated on risk for fall and use of call light.     Patient is not progressing towards the following goals:

## 2024-06-28 NOTE — PROGRESS NOTES
[facility] -  PROGRESS NOTE               Author: TOM Espinoza Date & Time: 6/28/2024  1:02 PM     HPI:  53 y.o. male with h/o HTN, hereditary hemochromatosis, cirrhosis complicated by ascites requiring TIPS, who presented to outside hospital with c/o generalized weakness, nausea, vomiting, decreased p.o. intake as well as  decreased urine output for the past several days.  He was found to be hypotensive 60 mmHg SBP on arrival .   Initial labs noted for Na 119, K 5.4 bicarb 13 and elevated Cr. Pt had diagnostic paracentetics with evidence of SBP.   Pt treated with 2 L of crystalloids, albumin 50 g and IV ceftriaxone and Flagyl.  He was transferred to [facility] for further evaluation and continuation of care.  Lab work upon arrival to [facility] showed WBC 21, S Na 117 ( 6/18 2.30 ) improved to 119 on AM labs, K 5.4, CO2 12, BUN 85, Cr 8.8, total bilirubin 2.9, lipase 697, urine sodium less than 20, UA moderate bilirubin positive nitrite, 3-5 WBCs 0-2 RBC and few bacteria  Blood pressure has since improved 90s to 100 systolic, he is on room air.  No urine output documented so far.  Patient is currently receiving albumin 25 g every 6 hours, and on norepinephrine .  Of note he was recently hospitalized for  gallstone pancreatitis status post ERCP and sphincterotomy, hospital course complicated by BACILIO in the setting of IV contrast and pancreatitis. He was seen by nephrology at th time.  Cr has  increased to 2.1 from 0.8 2/1.Cr has improved to baseline 0.8 6/8.   Pt has hx of chronic hyponatremia with serum sodium 130-134 during last admission   S Na . Pt was discharged on Lasix, spirolactone, lisinopril. Other pertinent home meds gabapentin and Trazodone.   Pt is alert and oriented, but  slow to answer questions. He has been taking motrin daily for abdominal pain following recent hospital admission. Pt says he has not used any ETOH at least for the past year or so.   No  F/C/CP/SOB.  No melena, hematochezia, hematemesis.  No HA, visual changes.    DAILY NEPHROLOGY SUMMARY:  6/18: consult done   6/19: No acute events, more alert today, answering questions appropriately,  cc/ 24 hrs, received lasix 100 mg earlier today, seem to be responding to IV lasix,  cc s/p lasix. Increase abdominal distention, wife at bed side, on vasopressor and NE, on room air, plan for paracentesis today   6/20: no acute events, UOP 3375 cc/24 hrs, on clears,on low dose NE,s/p paracentesis yesterday 6 L removed, wife at bed side, S Na improved to 123 appropriate rise, Cr improved to 7.62, per RN UOP slowing down 60 cc/ hr since am  appetite still poor.   6/21: Pt says he his feeling nauseous, still poor appetite, urine out put dropped 875 cc/24 hrs, on lasix gtt 5 mg/ hr, on pressors NE  6/22: UOP has dropped 215 cc over the past 24 hrs, pt reports abdominal discomfort and distention, low BP  systolic  6/23: HD yesterday #1 net  cc, pt seen on HD today, 205 cc UOP /24 hrs, remains on low dose levophed, no complaints besides abdominal pain , pending paracentesis   6/24: Patient seen on hemodialysis today, tolerated 1 L UF, remains on low-dose Levophed to maintain MAP, answer simple questions but remains confused  6/25: More alert and awake, on low-dose vasopressors, HD yesterday with 1 L UF  6/26: No acute events overnight, seen on hemodialysis, able to wean off vasopressors, tolerated 1 L UF  6/27; HD yesterday with 1 L UF, complains of abdominal distention, some difficulty breathing  6/28: OOB in BR. Reports he feels horribly, unable to obtain specifics. Refused HD yesterday. Agreeable to HD today.     REVIEW OF SYSTEMS:    10 point ROS reviewed and is as per HPI/daily summary or otherwise negative    PMH/PSH/SH/FH:  Reviewed and unchanged since admission note    CURRENT MEDICATIONS:  Reviewed from admission to present day    VS:  BP 99/58   Pulse (!) 105 Comment: RN notified   "Temp 36.1 °C (97 °F) (Temporal)   Resp 20   Ht 1.905 m (6' 3\")   Wt (!) 132 kg (290 lb 5.5 oz)   SpO2 97%   BMI 36.29 kg/m²   Physical Exam  Vitals and nursing note reviewed.   Constitutional:       General: He is not in acute distress.     Appearance: He is ill-appearing and toxic-appearing.   HENT:      Head: Normocephalic.      Mouth/Throat:      Mouth: Mucous membranes are moist.   Eyes:      General: Scleral icterus present.   Cardiovascular:      Rate and Rhythm: Tachycardia present.   Pulmonary:      Effort: Pulmonary effort is normal.   Abdominal:      General: There is distension.   Musculoskeletal:         General: Swelling present.      Right lower leg: Edema present.      Left lower leg: Edema present.      Comments: RIJ tunneled Cath    Skin:     General: Skin is warm.      Coloration: Skin is jaundiced.   Neurological:      Mental Status: He is oriented to person, place, and time.   Psychiatric:         Mood and Affect: Mood normal.         Fluids:    Intake/Output Summary (Last 24 hours) at 6/28/2024 1302  Last data filed at 6/28/2024 0713  Gross per 24 hour   Intake 350 ml   Output --   Net 350 ml       LABS:  Labs reviewed, pertinent labs below.    Recent Labs     06/26/24  0108 06/27/24  0410 06/28/24  1027   WBC 17.5* 25.7* 41.3*   RBC 3.01* 3.14* 3.21*   HEMOGLOBIN 10.0* 10.5* 10.8*   HEMATOCRIT 27.8* 30.1* 30.6*   MCV 92.4 95.9 95.3   MCH 33.2* 33.4* 33.6*   MCHC 36.0 34.9 35.3   RDW 55.5* 59.1* 58.1*   PLATELETCT 59* 62* 91*   MPV 11.2 10.9 12.0       Recent Labs     06/26/24  0108 06/27/24  0410 06/28/24  1027   SODIUM 127* 129* 127*   POTASSIUM 4.2 4.6 5.0   CHLORIDE 90* 92* 91*   CO2 20 20 18*   GLUCOSE 103* 99 128*   BUN 56* 44* 61*   CREATININE 3.97* 3.55* 4.93*   CALCIUM 9.0 8.8 9.4        IMAGING:  All imaging reviewed from admission to present day      IMPRESSION:  # BACILIO oliguric   - Suspect sec to  ischemic ATN in the setting of unstable hemodynamics, decrease EABV, septic shock, " SBP, NSAIDs, and bile cast nephropathy   - On initial presentation there was low suspicion for HRS given sepsis picture, some improvement  in renal function early in the course but worsened again,suspect HRS, abdominal compartment syndrome from tense ascites   - No evidence of obstruction on CT from outside hospital   - Recent Hx of BACILIO, Cr improved to 0.8 6/8   - HD started 6/22 via RIJ PC  # Cirrhosis, MELD 33   - Possible etiologies hereditary hemochromatosis versus history of alcoholism   - ? Liver transplant candidacy per previous evaluation    - No recent ETOH use for about a year   # Ascites   # Hyponatremia   - Suspect sec to cirrhosis, decreased EABV altering ADH secretion   - Also gabapentin and trazodone can alter ADH secretion and may have contributed , on hold   # Septic shock likely sec to SBP, leukocytosis resolved    # Metabolic acidosis   # SBP   # Hypervolemia, total body volume overload with decrease EABV  # Thrombocytopenia   # Anemia   - No need of BARBARA in BACILIO   # Low serum albumin   # Hyperphosphatemia, hypocalcemia         SUGGESTIONS:  -Refused HD yesterday, agreeable to iHD today for increased UF  - Cont HD q TTS and PRN in between as needed   - Continue p.o. midodrine 15  mg tid   - Off antibiotics now   - Avoid nephrotoxins   - Adjust meds for renal function   - Limit fluid intake 1200 cc/d   - Continue Ca acetate 1334  mg tid ac for phosphorus binding  - Transfuse PRBC PRN  - Palliative care consulted      Prognosis remains guarded, will likely continue to need ongoing dialysis

## 2024-06-28 NOTE — CONSULTS
PALLIATIVE CARE SOCIAL WORK CONSULT NOTE    Patient: Ryley Gaines  Age: 53  Gender: Male  MRN: [id number]  Insurance: [place]  Date Admitted: 6/18/24  Date of Service: 6/28/24    Palliative care consult noted. Patient is currently in dialysis and is not available for discussion. Palliative care will follow up with patient on 7/1.    Maria T Muro LMSW  Palliative Care

## 2024-06-28 NOTE — PROGRESS NOTES
Received report from SAMMY aldridge. Pt brought to floor by transport via wheelchair. Pt ambulated from wheelchair to bedside with no assistive device and standby assist.

## 2024-06-28 NOTE — PROGRESS NOTES
Received report and assumed care. Patient is A&O x 4, non compliant with use of call light for assistance. Refused bed alarm, educated on risk for fall, reminded to call for assistance to ambulate, patient continue to be non compliant. Charge aware on bed alarm and strip alarm refusal.

## 2024-06-28 NOTE — THERAPY
Occupational Therapy Contact Note    Patient Name: Ryley Gaines  Age:  53 y.o., Sex:  male  Medical Record #: [id number]  Today's Date: 6/28/2024    OT cherry escobar, pt in dialysis. Will follow-up at later time as able/appropriate.    Cindy Perez, SHANA, OTR/L

## 2024-06-28 NOTE — THERAPY
"Physical Therapy   Initial Evaluation     Patient Name: Ryley Gaines  Age:  53 y.o., Sex:  male  Medical Record #: [id number]  Today's Date: 6/28/2024     Precautions  Precautions: Fall Risk  Comments: MELD 33    Assessment  Ryley Gaines is a 52yo male with cirrhosis (hemochromatosis), that was transferred from [facility] with acute kidney insult, hyponatremia and leukocytosis.  He was recently admitted discharged earlier this month with gallstone pancreatitis and required ERCP and sphincterotomy.    Patient presents to PT al with pain, impaired balance, strength and mobility. He was limited to in room ambulation because of pain and fatigue. Anticipate patient will progress to home with home health therapy. Will continue to follow while in house.     Plan    Physical Therapy Initial Treatment Plan   Treatment Plan : Bed Mobility, Equipment, Family / Caregiver Training, Gait Training, Neuro Re-Education / Balance, Self Care / Home Evaluation, Stair Training, Therapeutic Activities, Therapeutic Exercise  Treatment Frequency: 3 Times per Week  Duration: Until Therapy Goals Met    DC Equipment Recommendations: Unable to determine at this time  Discharge Recommendations: Recommend home health for continued physical therapy services       Subjective    \"I'm not good\"     Objective       06/28/24 1223   Precautions   Precautions Fall Risk   Comments MELD 33   Pain 0 - 10 Group   Location Abdomen   Location Orientation Mid   Pain Rating Scale (NPRS) 8   Description Aching;Constant   Therapist Pain Assessment Post Activity Pain Same as Prior to Activity;8   Prior Living Situation   Prior Services Home-Independent   Housing / Facility 1 Story House   Steps Into Home 2   Steps In Home 0   Equipment Owned None   Lives with - Patient's Self Care Capacity Spouse;Child Less than 18 Years of Age   Comments Lives with his wife and their twin 20 yo sons in [place], [place]. He works in nuclear medicine   Prior Level of " Functional Mobility   Bed Mobility Independent   Transfer Status Independent   Ambulation Independent   Assistive Devices Used None   Stairs Independent   Comments two weeks PTA, patient was indep with all mobility   History of Falls   History of Falls No   Cognition    Cognition / Consciousness X   Speech/ Communication Delayed Responses   Orientation Level Not Oriented to Month;Not Oriented to Day;Not Oriented to Time   Level of Consciousness Responds to voice   Safety Awareness Impaired   Attention Impaired   Comments patient lethargic and limited by pain. Knows it's 2024 but cannot stat the month   Active ROM Upper Body   Active ROM Upper Body  X   Strength Upper Body   Upper Body Strength  X   Gross Strength Generalized Weakness, Equal Bilaterally.    Active ROM Lower Body    Active ROM Lower Body  X   Comments limited by edema   Strength Lower Body   Lower Body Strength  X   Gross Strength Generalized Weakness, Equal Bilaterally   Comments grossly 3/5   Sensation Lower Body   Lower Extremity Sensation   X   Comments baseline PN distal to the knees   Other Treatments   Other Treatments Provided educated about pathologies of bedrest   Balance Assessment   Sitting Balance (Static) Fair   Sitting Balance (Dynamic) Fair   Standing Balance (Static) Fair   Standing Balance (Dynamic) Fair -   Weight Shift Sitting Fair   Weight Shift Standing Fair   Comments holding onto furniture, refused AD   Bed Mobility    Supine to Sit Supervised   Sit to Supine Supervised   Scooting Supervised   Gait Analysis   Gait Level Of Assist Contact Guard Assist   Assistive Device None   Distance (Feet) 10   # of Times Distance was Traveled 2   Deviation Increased Base Of Support   Comments Patient only willing to ambulate into the bathroom and back, limited by fatigue nad abdominal pain   Functional Mobility   Sit to Stand Supervised   Bed, Chair, Wheelchair Transfer Refused   Toilet Transfers Supervised   ICU Target Mobility Level   ICU  Mobility - Targeted Level Level 4   6 Clicks Assessment - How much HELP from from another person do you currently need... (If the patient hasn't done an activity recently, how much help from another person do you think he/she would need if he/she tried?)   Turning from your back to your side while in a flat bed without using bedrails? 3   Moving from lying on your back to sitting on the side of a flat bed without using bedrails? 3   Moving to and from a bed to a chair (including a wheelchair)? 3   Standing up from a chair using your arms (e.g., wheelchair, or bedside chair)? 3   Walking in hospital room? 3   Climbing 3-5 steps with a railing? 2   6 clicks Mobility Score 17   Edema / Skin Assessment   Edema / Skin  X   Right Lower Extremity Edema 4+ Pitting   Left Lower Extremity Edema 4+ Pitting   Patient / Family Goals    Patient / Family Goal #1 to rest   Short Term Goals    Short Term Goal # 1 in 6 visits patient will demo all functional transfers with Sup and LRAD for safe DC   Short Term Goal # 2 in 6 visits patient will navigate 2 stairs with CGA and LRAD for safe navigation into the home   Short Term Goal # 3 in 6 visits patient will ambualte 200' Sup w/LRAD for safe DC   Education Group   Education Provided Role of Physical Therapist   Role of Physical Therapist Patient Response Patient;Acceptance;Explanation;Demonstration;Verbal Demonstration   Physical Therapy Initial Treatment Plan    Treatment Plan  Bed Mobility;Equipment;Family / Caregiver Training;Gait Training;Neuro Re-Education / Balance;Self Care / Home Evaluation;Stair Training;Therapeutic Activities;Therapeutic Exercise   Treatment Frequency 3 Times per Week   Duration Until Therapy Goals Met   Problem List    Problems Pain;Impaired Ambulation;Functional Strength Deficit;Functional ROM Deficit;Impaired Balance;Decreased Activity Tolerance;Safety Awareness Deficits / Cognition   Anticipated Discharge Equipment and Recommendations   DC Equipment  Recommendations Unable to determine at this time   Discharge Recommendations Recommend home health for continued physical therapy services     Lina Gamez, PT, DPT, GCS

## 2024-06-29 NOTE — CARE PLAN
The patient is Stable - Low risk of patient condition declining or worsening    Shift Goals  Clinical Goals: up for meals, monitor BP,  hemodynamic stability  Patient Goals: rest and pain control  Family Goals: daryl    Progress made toward(s) clinical / shift goals:    Needed encouragement to get dialysis today.. Had refused yesterday. Right IJ ok to be removed per Dr. Akins. Patient complained of having trouble voiding, not able to void yet today. Patient bladder scanned but refused to lay completely flat; bladder scan number ranged from 300-600. Dr. Akins made aware- inconclusive due to ascites and unable to lay flat.

## 2024-06-29 NOTE — CARE PLAN
The patient is Watcher - Medium risk of patient condition declining or worsening    Shift Goals  Clinical Goals: MRAbdomen, pain mgt.  Patient Goals: feel better  Family Goals: none present    Progress made toward(s) clinical / shift goals: Patient reported abdominal pain 8-9/10, firm, tender abdomen. Oxycodone 5mg tab. PO given (see MAR), MR-abdomen was unsuccessful  as patient cannot tolerate lying flat for the procedure due to pain.     Patient is not progressing towards the following goals: N/A

## 2024-06-29 NOTE — PROGRESS NOTES
Notified RHONDA James of patient's elevated Ca 19-9 of 214.0, Carcinoembryonic antigen 4.2, and Procal. 2.27.

## 2024-06-29 NOTE — PROGRESS NOTES
[facility]     Dialysis treatment started at 1245 and completed at 1404. Nephrologist MICHAEL Carrasco notified of treatment start.     NET UF: 135 mL     Patient is A&Ox4, has fatigue and generalized pain. Patient was refusing treatment, patient education given, importance of dialysis and non-compliance symptoms and complications explained to patient. Voiced understanding. VS within normal limits. Right chest CVC, dressing is CDI and no signs and symptoms of infection noted. CVC Lines are patent w/ good blood flow. Lab results and orders reviewed prior to treatment start.     Patient completed and tolerated dialysis treatment well w/o complications. VS stayed within normal limits. Right chest CVC care done aseptically, lines flushed, Heparin lock in each lines, then clamped and capped. Access site labelled and dated.    1600 Report given to SEGUN Vasques RN     See Dialysis flow sheet for details

## 2024-06-29 NOTE — PROGRESS NOTES
[facility] -  PROGRESS NOTE               Author: TOM Espinoza Date & Time: 6/29/2024  10:46 AM     HPI:  53 y.o. male with h/o HTN, hereditary hemochromatosis, cirrhosis complicated by ascites requiring TIPS, who presented to outside hospital with c/o generalized weakness, nausea, vomiting, decreased p.o. intake as well as  decreased urine output for the past several days.  He was found to be hypotensive 60 mmHg SBP on arrival .   Initial labs noted for Na 119, K 5.4 bicarb 13 and elevated Cr. Pt had diagnostic paracentetics with evidence of SBP.   Pt treated with 2 L of crystalloids, albumin 50 g and IV ceftriaxone and Flagyl.  He was transferred to [facility] for further evaluation and continuation of care.  Lab work upon arrival to [facility] showed WBC 21, S Na 117 ( 6/18 2.30 ) improved to 119 on AM labs, K 5.4, CO2 12, BUN 85, Cr 8.8, total bilirubin 2.9, lipase 697, urine sodium less than 20, UA moderate bilirubin positive nitrite, 3-5 WBCs 0-2 RBC and few bacteria  Blood pressure has since improved 90s to 100 systolic, he is on room air.  No urine output documented so far.  Patient is currently receiving albumin 25 g every 6 hours, and on norepinephrine .  Of note he was recently hospitalized for  gallstone pancreatitis status post ERCP and sphincterotomy, hospital course complicated by BACILIO in the setting of IV contrast and pancreatitis. He was seen by nephrology at th time.  Cr has  increased to 2.1 from 0.8 2/1.Cr has improved to baseline 0.8 6/8.   Pt has hx of chronic hyponatremia with serum sodium 130-134 during last admission   S Na . Pt was discharged on Lasix, spirolactone, lisinopril. Other pertinent home meds gabapentin and Trazodone.   Pt is alert and oriented, but  slow to answer questions. He has been taking motrin daily for abdominal pain following recent hospital admission. Pt says he has not used any ETOH at least for the past year or so.   No  F/C/CP/SOB.  No melena, hematochezia, hematemesis.  No HA, visual changes.    DAILY NEPHROLOGY SUMMARY:  6/18: consult done   6/19: No acute events, more alert today, answering questions appropriately,  cc/ 24 hrs, received lasix 100 mg earlier today, seem to be responding to IV lasix,  cc s/p lasix. Increase abdominal distention, wife at bed side, on vasopressor and NE, on room air, plan for paracentesis today   6/20: no acute events, UOP 3375 cc/24 hrs, on clears,on low dose NE,s/p paracentesis yesterday 6 L removed, wife at bed side, S Na improved to 123 appropriate rise, Cr improved to 7.62, per RN UOP slowing down 60 cc/ hr since am  appetite still poor.   6/21: Pt says he his feeling nauseous, still poor appetite, urine out put dropped 875 cc/24 hrs, on lasix gtt 5 mg/ hr, on pressors NE  6/22: UOP has dropped 215 cc over the past 24 hrs, pt reports abdominal discomfort and distention, low BP  systolic  6/23: HD yesterday #1 net  cc, pt seen on HD today, 205 cc UOP /24 hrs, remains on low dose levophed, no complaints besides abdominal pain , pending paracentesis   6/24: Patient seen on hemodialysis today, tolerated 1 L UF, remains on low-dose Levophed to maintain MAP, answer simple questions but remains confused  6/25: More alert and awake, on low-dose vasopressors, HD yesterday with 1 L UF  6/26: No acute events overnight, seen on hemodialysis, able to wean off vasopressors, tolerated 1 L UF  6/27; HD yesterday with 1 L UF, complains of abdominal distention, some difficulty breathing  6/28: OOB in BR. Reports he feels horribly, unable to obtain specifics. Refused HD yesterday. Agreeable to HD today.   6/29: 135 mL net UF removed with HD yesterday. Sitting up in bed, does not want dialysis because his wife is coming today and he has things he needs to take care of. Agreeable to HD tomorrow.     REVIEW OF SYSTEMS:    10 point ROS reviewed and is as per HPI/daily summary or otherwise  "negative    PMH/PSH/SH/FH:  Reviewed and unchanged since admission note    CURRENT MEDICATIONS:  Reviewed from admission to present day    VS:  /48   Pulse 87   Temp 35.9 °C (96.6 °F) (Temporal)   Resp 17   Ht 1.905 m (6' 3\")   Wt (!) 138 kg (304 lb 3.8 oz)   SpO2 95%   BMI 38.03 kg/m²   Physical Exam  Vitals and nursing note reviewed.   Constitutional:       General: He is not in acute distress.     Appearance: He is ill-appearing and toxic-appearing.   HENT:      Head: Normocephalic.      Mouth/Throat:      Mouth: Mucous membranes are moist.   Eyes:      General: Scleral icterus present.   Cardiovascular:      Rate and Rhythm: Tachycardia present.   Pulmonary:      Effort: Pulmonary effort is normal.   Abdominal:      General: There is distension.   Musculoskeletal:         General: Swelling present.      Right lower leg: Edema present.      Left lower leg: Edema present.      Comments: RIJ tunneled Cath    Skin:     General: Skin is warm.      Coloration: Skin is jaundiced.   Neurological:      Mental Status: He is oriented to person, place, and time.   Psychiatric:         Mood and Affect: Mood normal.         Fluids:    Intake/Output Summary (Last 24 hours) at 6/29/2024 1046  Last data filed at 6/29/2024 0600  Gross per 24 hour   Intake 850 ml   Output 637 ml   Net 213 ml       LABS:  Labs reviewed, pertinent labs below.    Recent Labs     06/27/24 0410 06/28/24  1027 06/29/24  0336   WBC 25.7* 41.3* 46.5*   RBC 3.14* 3.21* 3.16*   HEMOGLOBIN 10.5* 10.8* 10.5*   HEMATOCRIT 30.1* 30.6* 30.5*   MCV 95.9 95.3 96.5   MCH 33.4* 33.6* 33.2*   MCHC 34.9 35.3 34.4   RDW 59.1* 58.1* 60.9*   PLATELETCT 62* 91* 90*   MPV 10.9 12.0 11.8       Recent Labs     06/27/24  0410 06/28/24  1027 06/29/24  0336   SODIUM 129* 127* 129*   POTASSIUM 4.6 5.0 5.0   CHLORIDE 92* 91* 93*   CO2 20 18* 17*   GLUCOSE 99 128* 104*   BUN 44* 61* 58*   CREATININE 3.55* 4.93* 4.95*   CALCIUM 8.8 9.4 9.4        IMAGING:  All imaging " reviewed from admission to present day      IMPRESSION:  # BACILIO oliguric   - Suspect sec to  ischemic ATN in the setting of unstable hemodynamics, decrease EABV, septic shock, SBP, NSAIDs, and bile cast nephropathy   - On initial presentation there was low suspicion for HRS given sepsis picture, some improvement  in renal function early in the course but worsened again,suspect HRS, abdominal compartment syndrome from tense ascites   - No evidence of obstruction on CT from outside hospital   - Recent Hx of BACILIO, Cr improved to 0.8 6/8   - HD started 6/22 via RIJ PC  # Cirrhosis, MELD 34 6/18 65% 90 day mortality  - Possible etiologies hereditary hemochromatosis versus history of alcoholism   - ? Liver transplant candidacy per previous evaluation    - No recent ETOH use for about a year   # Ascites   # Hyponatremia   - Suspect sec to cirrhosis, decreased EABV altering ADH secretion   - Also gabapentin and trazodone can alter ADH secretion and may have contributed , on hold   # Septic shock likely sec to SBP, leukocytosis resolved    # Metabolic acidosis   # SBP   # Hypervolemia, total body volume overload with decrease EABV  # Thrombocytopenia   # Anemia   - No need of BARBARA in BACILIO   # Low serum albumin   # Hyperphosphatemia, hypocalcemia         SUGGESTIONS:  - Refusing iHD today, agreeable to iHD tomorrow (SUN) for increased UF  - Cont HD q TTS and PRN in between as needed   - Continue p.o. midodrine 15  mg tid   - Off antibiotics now   - Avoid nephrotoxins   - Adjust meds for renal function   - Limit fluid intake 1200 cc/d   - Continue Ca acetate 1334  mg tid ac for phosphorus binding  - Transfuse PRBC PRN  - Palliative care consulted      Prognosis remains guarded, will likely continue to need ongoing dialysis

## 2024-06-29 NOTE — PROGRESS NOTES
Patient was back from MR unit, MR unit staff called and notify this nurse that MR-abdomen was unsuccessful  as patient cannot tolerate lying flat for the procedure due to pain. Notified ADELA Cortes.

## 2024-06-29 NOTE — PROGRESS NOTES
Hospital Medicine Daily Progress Note    Date of Service  6/29/2024    Chief Complaint  Ryley Gaines is a 53 y.o. male admitted 6/18/2024 with generalized weakness    Hospital Course  Admitted as a transfer for generalized weakness, nausea, vomiting, abdominal pain, edema.  He was noted in outside facility to have acute kidney injury with a creatinine of 10, hyponatremia of 119, hyperkalemia 5.4.  He has known history of liver cirrhosis with ascites. He also had a recent admission at [facility] for alcohol versus gallstone pancreatitis, ERCP with sphincterotomy was done on 6/4/2024.  He also developed acute kidney injury then but his creatinine did improve.  Patient was a poor surgical candidate for cholecystectomy for the cholelithiasis and this was not performed. Paracentesis was done which showed evidence of probable spontaneous bacterial peritonitis, and he was started on IV Rocephin and Flagyl.    He was requiring pressor support and then was transferred to [facility].    He was admitted by critical care to the ICU, he was continued on pressor support.  Nephrology was consulted on the case.  His kidney function failed to improve, tunneled hemodialysis catheter was placed on 6/22/2024, and hemodialysis was initiated.  Has finished the course of IV antibiotics, however his leukocytosis has continued to trend up, as well as his LFTs.    Interval Problem Update  BACILIO - crea 4.9, refused HD  Pancreatitis - lipase 460, unable to do MRCP  Leukocytosis - [id number], CXR and Venous duplex negative, UA pending, unable to do MRCP  Cirrhosis - LFTs trending up, INR 2.6  Hyponatremia - 129    Updates given and plan of care discussed with patient's spouse, lengthy discussion, multiple questions answered, explained very poor prognosis, informed Palliative care has been consulted for goals of care discussion.    I have discussed this patient's plan of care and discharge plan at IDT rounds today with Case Management,  Nursing, Nursing leadership, and other members of the IDT team.    Consultants/Specialty  critical care, nephrology, and palliative care    Code Status  Full Code    Disposition  The patient is not medically cleared for discharge to home or a post-acute facility.  Anticipate discharge to: skilled nursing facility    I have placed the appropriate orders for post-discharge needs.    Review of Systems  Review of Systems   Constitutional:  Positive for malaise/fatigue. Negative for chills, diaphoresis and fever.   HENT:  Negative for congestion, hearing loss and sore throat.    Eyes:  Negative for blurred vision.   Respiratory:  Negative for cough, shortness of breath and wheezing.    Cardiovascular:  Positive for leg swelling. Negative for chest pain and palpitations.   Gastrointestinal:  Positive for abdominal pain. Negative for diarrhea, heartburn, nausea and vomiting.   Genitourinary:  Negative for dysuria, flank pain and hematuria.   Musculoskeletal:  Negative for back pain, joint pain, myalgias and neck pain.   Skin:  Negative for rash.   Neurological:  Positive for weakness. Negative for dizziness, sensory change, speech change, focal weakness and headaches.   Psychiatric/Behavioral:  The patient is nervous/anxious.         Physical Exam  Temp:  [35.9 °C (96.6 °F)-36.2 °C (97.2 °F)] 35.9 °C (96.6 °F)  Pulse:  [53-99] 87  Resp:  [16-19] 17  BP: (102-118)/(43-89) 114/48  SpO2:  [93 %-96 %] 95 %    Physical Exam  Vitals and nursing note reviewed.   Constitutional:       Appearance: He is obese. He is ill-appearing.   HENT:      Head: Normocephalic and atraumatic.      Nose: No congestion.      Mouth/Throat:      Mouth: Mucous membranes are dry.   Eyes:      General: Scleral icterus present.      Extraocular Movements: Extraocular movements intact.      Conjunctiva/sclera: Conjunctivae normal.   Cardiovascular:      Rate and Rhythm: Normal rate and regular rhythm.   Pulmonary:      Effort: Pulmonary effort is normal.       Breath sounds: Decreased air movement present.   Abdominal:      General: There is distension.      Tenderness: There is abdominal tenderness. There is no guarding or rebound.   Musculoskeletal:         General: Swelling (both upper extremities) present.      Cervical back: No tenderness.      Right lower leg: Edema present.      Left lower leg: Edema present.   Skin:     Coloration: Skin is jaundiced.      Findings: Bruising present.   Neurological:      General: No focal deficit present.      Mental Status: He is alert and oriented to person, place, and time.      Cranial Nerves: No cranial nerve deficit.   Psychiatric:         Speech: Speech is delayed.         Cognition and Memory: He exhibits impaired recent memory.         Fluids    Intake/Output Summary (Last 24 hours) at 6/29/2024 1301  Last data filed at 6/29/2024 1221  Gross per 24 hour   Intake 1090 ml   Output 637 ml   Net 453 ml       Laboratory  Recent Labs     06/27/24  0410 06/28/24  1027 06/29/24  0336   WBC 25.7* 41.3* 46.5*   RBC 3.14* 3.21* 3.16*   HEMOGLOBIN 10.5* 10.8* 10.5*   HEMATOCRIT 30.1* 30.6* 30.5*   MCV 95.9 95.3 96.5   MCH 33.4* 33.6* 33.2*   MCHC 34.9 35.3 34.4   RDW 59.1* 58.1* 60.9*   PLATELETCT 62* 91* 90*   MPV 10.9 12.0 11.8     Recent Labs     06/27/24  0410 06/28/24  1027 06/29/24  0336   SODIUM 129* 127* 129*   POTASSIUM 4.6 5.0 5.0   CHLORIDE 92* 91* 93*   CO2 20 18* 17*   GLUCOSE 99 128* 104*   BUN 44* 61* 58*   CREATININE 3.55* 4.93* 4.95*   CALCIUM 8.8 9.4 9.4     Recent Labs     06/29/24  0336   INR 2.65*               Imaging  US-EXTREMITY VENOUS LOWER BILAT   Final Result      DX-CHEST-LIMITED (1 VIEW)   Final Result      1.  RIGHT neck catheter tip projects over the RIGHT ventricle, suggest retracting   2.  Bibasilar underinflation atelectasis, less likely pneumonia      US-PARACENTESIS, ABD WITH IMAGING   Final Result      1. Ultrasound-guided diagnostic and therapeutic paracentesis of the right lower quadrant of  the abdominal wall.      2. 5,250 mL of fluid withdrawn.      EC-ECHOCARDIOGRAM COMPLETE W/O CONT   Final Result      IR-LIN,GROSHONG PLACEMENT >5   Final Result      1. Ultrasound and fluoroscopic guided placement of a right internal jugular 14.5 Vietnamese HemoSplit tunneled dialysis catheter.      2. The hemodialysis catheter may be used immediately as clinically indicated. Flushes per protocol.         US-LIVER AND VESSELS LTD (TIPS) (COMBO)   Final Result      1. Patent TIPS shunt.   2. Cirrhotic morphology of the liver and ascites.   3. Sludge and stones in the gallbladder lumen. Gallbladder wall thickening is secondary to adjacent ascites.      OUTSIDE IMAGES-CT ABDOMEN /PELVIS   Final Result           Assessment/Plan  * BACILIO (acute kidney injury) (HCC)- (present on admission)  Assessment & Plan  Tunneled Hemodialysis catheter placement 6/22  HD per Nephrology - patient refused today  Consulted Palliative care - very poor prognosis      Cholelithiasis- (present on admission)  Assessment & Plan  Unable to do MRCP  IV Zosyn    Septic shock (HCC)- (present on admission)  Assessment & Plan  Secondary to SBP?      Cirrhosis of liver with ascites (HCC)- (present on admission)  Assessment & Plan  MELD 34  6/18  65% 90 day mortality  Etiology: Alcohol per [facility], + PETH 7/2023, failure to follow up since   TIPS  Ultrasound-guided paracenteses 5.2 L removed 6/23/2024  Lactulose, Rifaximin  Hold Lasix and Aldactone due to BACILIO  Follow cmp, INR, ammonia  Consulted Palliative care - very poor prognosis  Unlikely candidate for Transplant    Pancreatitis- (present on admission)  Assessment & Plan  ERCP with sphincterotomy 6/4/2024  Alcohol versus gallstone  Unable to do MRCP  IV Zosyn  Pain control    Anemia, chronic disease- (present on admission)  Assessment & Plan  Follow cbc    Coagulopathy (HCC)- (present on admission)  Assessment & Plan  Follow INR    Gastric lesion- (present on admission)  Assessment & Plan  Will need  follow up    Pancreatic lesion- (present on admission)  Assessment & Plan  CA 19-9 and CEA elevated    Leukocytosis- (present on admission)  Assessment & Plan  Cholangitis?  IV Zosyn  Unable to do MRCP  UA pending    Hyponatremia- (present on admission)  Assessment & Plan  Follow cmp    Thrombocytopenia (HCC)- (present on admission)  Assessment & Plan  Follow cbc    Hereditary hemochromatosis (HCC)- (present on admission)  Assessment & Plan  History of?         VTE prophylaxis:   SCDs/TEDs      I have performed a physical exam and reviewed and updated ROS and Plan today (6/29/2024). In review of yesterday's note (6/28/2024), there are no changes except as documented above.

## 2024-06-30 NOTE — CARE PLAN
The patient is Stable - Low risk of patient condition declining or worsening    Shift Goals  Clinical Goals: tolerate dialysis, iv abx  Patient Goals: more comfortable  Family Goals: n/a none present    Progress made toward(s) clinical / shift goals:    Problem: Pain - Standard  Goal: Alleviation of pain or a reduction in pain to the patient’s comfort goal  Description: Target End Date:  Prior to discharge or change in level of care    Document on Vitals flowsheet    1.  Document pain using the appropriate pain scale per order or unit policy  2.  Educate and implement non-pharmacologic comfort measures (i.e. relaxation, distraction, massage, cold/heat therapy, etc.)  3.  Pain management medications as ordered  4.  Reassess pain after pain med administration per policy  5.  If opiods administered assess patient's response to pain medication is appropriate per POSS sedation scale  6.  Follow pain management plan developed in collaboration with patient and interdisciplinary team (including palliative care or pain specialists if applicable)  Outcome: Progressing     Problem: Skin Integrity  Goal: Skin integrity is maintained or improved  Description: Target End Date:  Prior to discharge or change in level of care    Document interventions on Skin Risk/Travis flowsheet groups and corresponding LDA    1.  Assess and monitor skin integrity, appearance and/or temperature  2.  Assess risk factors for impaired skin integrity and/or pressures ulcers  3.  Implement precautions to protect skin integrity in collaboration with interdisciplinary team  4.  Implement pressure ulcer prevention protocol if at risk for skin breakdown  5.  Confirm wound care consult if at risk for skin breakdown  6.  Ensure patient use of pressure relieving devices  (Low air loss bed, waffle overlay, heel protectors, ROHO cushion, etc)  Outcome: Progressing     Patient refused dialysis along with evening medications. Patient alert but fatigued today.  Flat affect with delayed responses. Frequently sighing or complaining of feeling uncomfortable but does not elaborate why. Oxycodone per MAR for pain. Wife received update today by MD.

## 2024-06-30 NOTE — PROGRESS NOTES
Patient is A&0 x 2-3, provided patient with orange juice, but he just took a sip. Offered ice cream, consumed 2 tbsp and refused to eat or drink anything.

## 2024-06-30 NOTE — PROGRESS NOTES
Mekhi from Lab called with critical result of WBC 56.4 at 0553. Critical lab result read back to Mekhi. RHONDA James notified of critical lab result at 0555.

## 2024-06-30 NOTE — PROGRESS NOTES
Hospital Medicine Daily Progress Note    Date of Service  6/30/2024    Chief Complaint  Ryley Gaines is a 53 y.o. male admitted 6/18/2024 with generalized weakness    Hospital Course  Admitted as a transfer for generalized weakness, nausea, vomiting, abdominal pain, edema.  He was noted in outside facility to have acute kidney injury with a creatinine of 10, hyponatremia of 119, hyperkalemia 5.4.  He has known history of liver cirrhosis with ascites. He also had a recent admission at [facility] for alcohol versus gallstone pancreatitis, ERCP with sphincterotomy was done on 6/4/2024.  He also developed acute kidney injury then but his creatinine did improve.  Patient was a poor surgical candidate for cholecystectomy for the cholelithiasis and this was not performed. Paracentesis was done which showed evidence of probable spontaneous bacterial peritonitis, and he was started on IV Rocephin and Flagyl.    He was requiring pressor support and then was transferred to [facility].    He was admitted by critical care to the ICU, he was continued on pressor support.  Nephrology was consulted on the case.  His kidney function failed to improve, tunneled hemodialysis catheter was placed on 6/22/2024, and hemodialysis was initiated.  Has finished the course of IV antibiotics, however his leukocytosis has continued to trend up, as well as his LFTs.    Interval Problem Update  BACILIO - crea 5.7, Potassium 5.9, refused HD yesterday  Pancreatitis -epigastric pain  Leukocytosis - 92471  Cirrhosis - LFTs trending up, INR 4  Hyponatremia - 129  Hypotension - sbp 80    Updates given and plan of care discussed with patient's family, lengthy discussion, multiple questions answered, explained very poor prognosis with multiple organ failure, end-stage liver disease, probable end-stage renal disease, CODE STATUS discussed, patient's family have decided on DNR/DNI, discussed goals of care, patient's family understands very poor  prognosis, and they have decided on comfort care measures only.    I have discussed this patient's plan of care and discharge plan at IDT rounds today with Case Management, Nursing, Nursing leadership, and other members of the IDT team.    Consultants/Specialty  critical care, nephrology, and palliative care    Code Status  Comfort Care/DNR    Disposition  The patient is not medically cleared for discharge to home or a post-acute facility.  Anticipate discharge to: hospice    I have placed the appropriate orders for post-discharge needs.    Review of Systems  Review of Systems   Constitutional:  Positive for malaise/fatigue. Negative for chills, diaphoresis and fever.   HENT:  Negative for congestion, hearing loss and sore throat.    Eyes:  Negative for blurred vision.   Respiratory:  Negative for cough, shortness of breath and wheezing.    Cardiovascular:  Positive for leg swelling. Negative for chest pain and palpitations.   Gastrointestinal:  Positive for abdominal pain. Negative for diarrhea, heartburn, nausea and vomiting.   Genitourinary:  Negative for dysuria, flank pain and hematuria.   Musculoskeletal:  Negative for back pain, joint pain, myalgias and neck pain.   Skin:  Negative for rash.   Neurological:  Positive for weakness. Negative for dizziness, sensory change, speech change, focal weakness and headaches.   Psychiatric/Behavioral:  The patient is nervous/anxious.         Physical Exam  Temp:  [35.3 °C (95.5 °F)-36.6 °C (97.9 °F)] 35.3 °C (95.5 °F)  Pulse:  [80-97] 97  Resp:  [16-19] 17  BP: ()/(35-43) 83/35  SpO2:  [93 %-100 %] 93 %    Physical Exam  Vitals and nursing note reviewed.   Constitutional:       Appearance: He is obese. He is ill-appearing and toxic-appearing.   HENT:      Head: Normocephalic and atraumatic.      Nose: No congestion.      Mouth/Throat:      Mouth: Mucous membranes are dry.   Eyes:      General: Scleral icterus present.      Extraocular Movements: Extraocular  movements intact.      Conjunctiva/sclera: Conjunctivae normal.   Cardiovascular:      Rate and Rhythm: Normal rate and regular rhythm.   Pulmonary:      Effort: Accessory muscle usage present.      Breath sounds: Decreased air movement present.   Abdominal:      General: There is distension.      Tenderness: There is abdominal tenderness. There is no guarding or rebound.   Musculoskeletal:         General: Swelling (both upper extremities) present.      Cervical back: No tenderness.      Right lower leg: Edema present.      Left lower leg: Edema present.   Skin:     Coloration: Skin is jaundiced.      Findings: Bruising present.   Neurological:      General: No focal deficit present.      Mental Status: He is lethargic.      Cranial Nerves: No cranial nerve deficit.      Comments: Oriented to person   Psychiatric:         Mood and Affect: Mood is anxious.         Speech: Speech is delayed.         Cognition and Memory: Cognition is impaired. He exhibits impaired recent memory.         Judgment: Judgment is impulsive.         Fluids    Intake/Output Summary (Last 24 hours) at 6/30/2024 1404  Last data filed at 6/30/2024 0446  Gross per 24 hour   Intake 470 ml   Output --   Net 470 ml       Laboratory  Recent Labs     06/28/24  1027 06/29/24  0336 06/30/24  0347   WBC 41.3* 46.5* 56.4*   RBC 3.21* 3.16* 3.04*   HEMOGLOBIN 10.8* 10.5* 10.3*   HEMATOCRIT 30.6* 30.5* 30.0*   MCV 95.3 96.5 98.7*   MCH 33.6* 33.2* 33.9*   MCHC 35.3 34.4 34.3   RDW 58.1* 60.9* 64.1*   PLATELETCT 91* 90* 101*   MPV 12.0 11.8 11.4     Recent Labs     06/28/24  1027 06/29/24  0336 06/30/24  0347   SODIUM 127* 129* 129*   POTASSIUM 5.0 5.0 5.9*   CHLORIDE 91* 93* 90*   CO2 18* 17* 9*   GLUCOSE 128* 104* 39*   BUN 61* 58* 73*   CREATININE 4.93* 4.95* 5.72*   CALCIUM 9.4 9.4 9.3     Recent Labs     06/29/24  0336 06/30/24  0347   INR 2.65* 4.04*               Imaging  US-EXTREMITY VENOUS LOWER BILAT   Final Result      DX-CHEST-LIMITED (1 VIEW)    Final Result      1.  RIGHT neck catheter tip projects over the RIGHT ventricle, suggest retracting   2.  Bibasilar underinflation atelectasis, less likely pneumonia      US-PARACENTESIS, ABD WITH IMAGING   Final Result      1. Ultrasound-guided diagnostic and therapeutic paracentesis of the right lower quadrant of the abdominal wall.      2. 5,250 mL of fluid withdrawn.      EC-ECHOCARDIOGRAM COMPLETE W/O CONT   Final Result      IR-LIN,GROSHONG PLACEMENT >5   Final Result      1. Ultrasound and fluoroscopic guided placement of a right internal jugular 14.5 Italian HemoSplit tunneled dialysis catheter.      2. The hemodialysis catheter may be used immediately as clinically indicated. Flushes per protocol.         US-LIVER AND VESSELS LTD (TIPS) (COMBO)   Final Result      1. Patent TIPS shunt.   2. Cirrhotic morphology of the liver and ascites.   3. Sludge and stones in the gallbladder lumen. Gallbladder wall thickening is secondary to adjacent ascites.      OUTSIDE IMAGES-CT ABDOMEN /PELVIS   Final Result           Assessment/Plan  * BACILIO (acute kidney injury) (HCC)- (present on admission)  Assessment & Plan  Tunneled Hemodialysis catheter placement 6/22  Consulted Palliative care - very poor prognosis  Comfort care      Cholelithiasis- (present on admission)  Assessment & Plan  Comfort care    Septic shock (HCC)- (present on admission)  Assessment & Plan  Secondary to SBP?  Comfort care      Cirrhosis of liver with ascites (HCC)- (present on admission)  Assessment & Plan  MELD 34  6/18  65% 90 day mortality  Etiology: Alcohol per [facility], + PETH 7/2023, failure to follow up since   TIPS  Ultrasound-guided paracenteses 5.2 L removed 6/23/2024  Consulted Palliative care - very poor prognosis  Unlikely candidate for Transplant  Comfort care    Pancreatitis- (present on admission)  Assessment & Plan  Comfort care    Anemia, chronic disease- (present on admission)  Assessment & Plan  Comfort care    Coagulopathy  (HCC)- (present on admission)  Assessment & Plan  Comfort care    Gastric lesion- (present on admission)  Assessment & Plan  Comfort care    Pancreatic lesion- (present on admission)  Assessment & Plan  CA 19-9 and CEA elevated  Comfort care    Leukocytosis- (present on admission)  Assessment & Plan  Cholangitis?  Comfort care    Hyponatremia- (present on admission)  Assessment & Plan  Comfort care    Thrombocytopenia (HCC)- (present on admission)  Assessment & Plan  Comfort care    Hereditary hemochromatosis (HCC)- (present on admission)  Assessment & Plan  History of?  Comfort care         VTE prophylaxis:    pharmacologic prophylaxis contraindicated due to Comfort care      I have performed a physical exam and reviewed and updated ROS and Plan today (6/30/2024). In review of yesterday's note (6/29/2024), there are no changes except as documented above.

## 2024-06-30 NOTE — PROGRESS NOTES
CNA took BP on L. upper arm 66/23. Retook BP on R. Upper arm 83/35. MD notified, bolus of LR ordered.

## 2024-06-30 NOTE — PROGRESS NOTES
Rn continually checking on pt and family. Family requesting that I wait to give pain medication until rest of family arrives.

## 2024-06-30 NOTE — PROGRESS NOTES
NOC HOSPITALIST CROSS COVER    Notified by RN regarding critically low glucose as well as low blood pressure.  Glucose was 24 blood pressure was in the 80s.  Order placed for 2 A D50.  Blood sugar improved to 190s.  RN advised to give morning dose of midodrine and early.  Blood pressure is not improving.  Order placed for morning cortisol level.  Patient's Maddrey's discriminant function is greater than 140, while awaiting the results of a.m. cortisol is reasonable to believe that patient would benefit from at least 1 dose of 40 mg IV methylprednisolone.  Continue to closely monitor patient's prognosis is quite poor, the same MELD score is 40 giving him an estimated 3-month mortality of 71%.  Recommend continuing goals of care conversations.            A/P:  # Hypotension; hypoglycemia  -A.m. cortisol ordered and pending  -2 A D50  -40 mg Solu-Medrol IV        -----------------------------------------------------------------------------------------------------------    Electronically signed by:  NIC Hernandez PA-C  Hospitalist Services

## 2024-06-30 NOTE — PROGRESS NOTES
Pt IV infiltrated Rn attempted to get IV, but was unable. Charge and US guided RN attempted to place IV 3 times, but was unsuccessful. MD aware. Rapid RN called and will attempt to place IV.

## 2024-06-30 NOTE — PROGRESS NOTES
Chrity from Lab called with critical result of glucose 39, CO2 9 at total bilirubin 22.0. Critical lab result read back to Adina.   RHONDA James notified of critical lab result at 0448.

## 2024-06-30 NOTE — CARE PLAN
The patient is Watcher - Medium risk of patient condition declining or worsening    Shift Goals  Clinical Goals: Improve compliance to tx plan, pain mgt.  Patient Goals: comfort  Family Goals: none present    Progress made toward(s) clinical / shift goals: Patient verbalized need of pain meds., Oxycodone 5mg tab. PO given (see MAR). Anuric, dialysis patient and agreed to have HD tomorrow if indicated.       Patient is not progressing towards the following goals:      Problem: Knowledge Deficit - Standard  Goal: Patient and family/care givers will demonstrate understanding of plan of care, disease process/condition, diagnostic tests and medications  Outcome: Not Met     Problem: Pain - Standard  Goal: Alleviation of pain or a reduction in pain to the patient’s comfort goal  Outcome: Not Progressing     Problem: Urinary - Renal Perfusion  Goal: Ability to achieve and maintain adequate renal perfusion and functioning will improve  Outcome: Not Progressing

## 2024-07-01 NOTE — DISCHARGE SUMMARY
Death Summary    Cause of Death  BACILIO due to liver cirrhosis     Comorbid Conditions at the Time of Death  Principal Problem:    BACILIO (acute kidney injury) (HCC) (POA: Yes)  Active Problems:    Pancreatitis (POA: Yes)    Cirrhosis of liver with ascites (HCC) (POA: Yes)    Septic shock (HCC) (POA: Yes)    Cholelithiasis (POA: Yes)    Hereditary hemochromatosis (HCC) (POA: Yes)    Thrombocytopenia (HCC) (POA: Yes)    Hyponatremia (POA: Yes)    Leukocytosis (POA: Yes)    Pancreatic lesion (POA: Yes)    Gastric lesion (POA: Yes)    Coagulopathy (HCC) (POA: Yes)    Anemia, chronic disease (POA: Yes)    Sepsis (HCC) (POA: Yes)  Resolved Problems:    * No resolved hospital problems. *      History of Presenting Illness and Hospital Course  This is a 53 y.o. male admitted 6/18/2024 as a transfer for generalized weakness, nausea, vomiting, abdominal pain, edema. He was noted in outside facility to have acute kidney injury with a creatinine of 10, hyponatremia of 119, hyperkalemia 5.4. He has known history of liver cirrhosis with ascites. He also had a recent admission at [facility] for Alcohol versus Gallstone pancreatitis, ERCP with sphincterotomy was done on 6/4/2024. He also developed acute kidney injury then but his creatinine did improve. Patient was a poor surgical candidate for cholecystectomy for the cholelithiasis and this was not performed. Paracentesis was done which showed evidence of probable spontaneous bacterial peritonitis, and he was started on IV Rocephin and Flagyl. He was requiring pressor support and then was transferred to [facility]. He was admitted by critical care to the ICU, he was continued on pressor support. Nephrology was consulted on the case. His kidney function failed to improve, tunneled hemodialysis catheter was placed on 6/22/2024, and hemodialysis was initiated. Has finished the course of IV antibiotics, however his leukocytosis has continued to trend up, as well as his LFTs.  He  continued to require hemodialysis with no renal recovery noted.  He was started empiric coverage with IV Zosyn, workup for infectious causes was started.  His leukocytosis and LFTs continue to trend up even with antibiotics on board, he appeared to be end-stage liver disease.  Patient was previously followed at the [facility] transplant clinic for consideration of transplant, but apparently was lost to follow-up since July 2023, he was unlikely a transplant candidate with poor compliance of follow-up.  He became more lethargic, clinical deterioration was noted, discussed with patient's family at length his very poor prognosis with multiple organ failure, end-stage liver disease, probable end-stage renal disease, CODE STATUS was changed to DNR/DNI, on further discussions of goals of care patient's family have decided on comfort care measures only.    Death Date: 06/30/24   Death Time: 1545         Pronounced By (RN1): Helen Verdin  Pronounced By (RN2): Biju Lim

## 2024-07-03 LAB
BACTERIA BLD CULT: NORMAL
BACTERIA BLD CULT: NORMAL
SIGNIFICANT IND 70042: NORMAL
SIGNIFICANT IND 70042: NORMAL
SITE SITE: NORMAL
SITE SITE: NORMAL
SOURCE SOURCE: NORMAL
SOURCE SOURCE: NORMAL

## 2024-07-16 LAB
FUNGUS SPEC CULT: NORMAL
FUNGUS SPEC FUNGUS STN: NORMAL
SIGNIFICANT IND 70042: NORMAL
SITE SITE: NORMAL
SOURCE SOURCE: NORMAL

## 2024-07-31 LAB
MYCOBACTERIUM SPEC CULT: NORMAL
RHODAMINE-AURAMINE STN SPEC: NORMAL
SIGNIFICANT IND 70042: NORMAL
SITE SITE: NORMAL
SOURCE SOURCE: NORMAL

## 2025-06-29 NOTE — ASSESSMENT & PLAN NOTE
MELD 34  6/18  65% 90 day mortality  Etiology: Alcohol per [facility], + PETH 7/2023, failure to follow up since   TIPS  Ultrasound-guided paracenteses 5.2 L removed 6/23/2024  Consulted Palliative care - very poor prognosis  Unlikely candidate for Transplant  Comfort care   9 (severe pain)

## (undated) DEVICE — TUBE SUCTION YANKAUER  1/4 X 6FT (20EA/CA)"

## (undated) DEVICE — TOWEL STOP TIMEOUT SAFETY FLAG (40EA/CA)

## (undated) DEVICE — GOWN SURGEONS LARGE - (32/CA)

## (undated) DEVICE — SYRINGE SAFETY 10 ML 18 GA X 1 1/2 BLUNT LL (100/BX 4BX/CA)

## (undated) DEVICE — BALLOON RETRIEVAL EXTRACTOR PRO RX   9-12MM

## (undated) DEVICE — BITE BLOCK ADULT 60FR (100EA/CA)

## (undated) DEVICE — SYRINGE SAFETY 5 ML 18 GA X 1-1/2 BLUNT LL (100/BX 4BX/CA)

## (undated) DEVICE — SYRINGE DISP. 60 CC LL - (30/BX, 12BX/CA)**WHEN THESE ARE GONE ORDER #500206**

## (undated) DEVICE — KIT CUSTOM PROCEDURE SINGLE FOR ENDO  (15/CA)

## (undated) DEVICE — AUTOCAP RX

## (undated) DEVICE — WATER IRRIGATION STERILE 1000ML (12EA/CA)

## (undated) DEVICE — LACTATED RINGERS INJ 1000 ML - (14EA/CA 60CA/PF)

## (undated) DEVICE — SPONGE GAUZE NON-STERILE 4X4 - (2000/CA 10PK/CA)

## (undated) DEVICE — SET EXTENSION WITH 2 PORTS (48EA/CA) ***PART #2C8610 IS A SUBSTITUTE*****

## (undated) DEVICE — FORCEP RADIAL JAW 4 STANDARD CAPACITY W/NEEDLE 240CM (40EA/BX)

## (undated) DEVICE — TUBING CLEARLINK DUO-VENT - C-FLO (48EA/CA)

## (undated) DEVICE — MASK WITH FACE SHIELD (25/BX 4BX/CA)

## (undated) DEVICE — CANISTER SUCTION RIGID RED 1500CC (40EA/CA)

## (undated) DEVICE — SENSOR OXIMETER ADULT SPO2 RD SET (20EA/BX)

## (undated) DEVICE — BLOCK BITE ENDOSCOPIC 2809 - (100/BX) INTERMEDIATE

## (undated) DEVICE — SYRINGE 12 CC LUER TIP - (80/BX) OBSOLETE ITEM

## (undated) DEVICE — SYRINGE SAFETY 3 ML 18 GA X 1 1/2 BLUNT LL (100/BX 8BX/CA)

## (undated) DEVICE — KIT  I.V. START (100EA/CA)

## (undated) DEVICE — TUBE E-T HI-LO CUFF 7.5MM (10EA/PK)

## (undated) DEVICE — TUBE CONNECTING SUCTION - CLEAR PLASTIC STERILE 72 IN (50EA/CA)

## (undated) DEVICE — ELECTRODE DUAL RETURN W/ CORD - (50/PK)

## (undated) DEVICE — GUIDEWARE ENDOSCOPIC JAGWIRE REVOLUTION RX 39 SPHINCTEROTOME (1EA)